# Patient Record
Sex: MALE | Race: WHITE | NOT HISPANIC OR LATINO | Employment: FULL TIME | URBAN - METROPOLITAN AREA
[De-identification: names, ages, dates, MRNs, and addresses within clinical notes are randomized per-mention and may not be internally consistent; named-entity substitution may affect disease eponyms.]

---

## 2017-01-03 ENCOUNTER — CLINICAL SUPPORT (OUTPATIENT)
Dept: PEDIATRIC CARDIOLOGY | Facility: CLINIC | Age: 42
End: 2017-01-03
Payer: COMMERCIAL

## 2017-01-03 DIAGNOSIS — I49.5 SINUS NODE DYSFUNCTION: ICD-10-CM

## 2017-01-03 PROCEDURE — 93294 REM INTERROG EVL PM/LDLS PM: CPT | Mod: S$GLB,,, | Performed by: PEDIATRICS

## 2017-01-03 PROCEDURE — 93296 REM INTERROG EVL PM/IDS: CPT | Mod: 51,S$GLB,, | Performed by: PEDIATRICS

## 2017-01-05 ENCOUNTER — PATIENT MESSAGE (OUTPATIENT)
Dept: PEDIATRIC CARDIOLOGY | Facility: CLINIC | Age: 42
End: 2017-01-05

## 2017-01-06 ENCOUNTER — TELEPHONE (OUTPATIENT)
Dept: CARDIOLOGY | Facility: CLINIC | Age: 42
End: 2017-01-06

## 2017-01-07 NOTE — PROGRESS NOTES
Implantable cardiac device remote interrogation reviewed; please refer to device clinic note for full details.

## 2017-01-12 ENCOUNTER — TELEPHONE (OUTPATIENT)
Dept: PEDIATRIC CARDIOLOGY | Facility: CLINIC | Age: 42
End: 2017-01-12

## 2017-01-12 NOTE — TELEPHONE ENCOUNTER
----- Message from Christiano Dutton sent at 1/11/2017  3:19 PM CST -----  Contact:  [facility] Ph: [phone]  [facility] called as a courtesy informing that the above named patient's application has been approved. Caller states if any question call back, and anyone will be able to assist.

## 2017-01-18 ENCOUNTER — TELEPHONE (OUTPATIENT)
Dept: CARDIOLOGY | Facility: CLINIC | Age: 42
End: 2017-01-18

## 2017-01-20 ENCOUNTER — LAB VISIT (OUTPATIENT)
Dept: LAB | Facility: HOSPITAL | Age: 42
End: 2017-01-20
Attending: PEDIATRICS
Payer: COMMERCIAL

## 2017-01-20 ENCOUNTER — ANTI-COAG VISIT (OUTPATIENT)
Dept: CARDIOLOGY | Facility: CLINIC | Age: 42
End: 2017-01-20

## 2017-01-20 DIAGNOSIS — Z79.01 LONG TERM CURRENT USE OF ANTICOAGULANT THERAPY: ICD-10-CM

## 2017-01-20 DIAGNOSIS — Z79.01 LONG-TERM (CURRENT) USE OF ANTICOAGULANTS: ICD-10-CM

## 2017-01-20 DIAGNOSIS — Z98.890 S/P FONTAN PROCEDURE: ICD-10-CM

## 2017-01-20 LAB
INR PPP: 1.9
PROTHROMBIN TIME: 19.5 SEC

## 2017-01-20 PROCEDURE — 85610 PROTHROMBIN TIME: CPT

## 2017-01-20 PROCEDURE — 36415 COLL VENOUS BLD VENIPUNCTURE: CPT | Mod: PO

## 2017-01-20 NOTE — PROGRESS NOTES
Patient reports a coumadin dose of 8mg daily and reports missing 2 doses but cannot confirm when he missed

## 2017-02-03 ENCOUNTER — PATIENT MESSAGE (OUTPATIENT)
Dept: PEDIATRIC CARDIOLOGY | Facility: CLINIC | Age: 42
End: 2017-02-03

## 2017-02-27 RX ORDER — WARFARIN 6 MG/1
TABLET ORAL
Qty: 30 TABLET | Refills: 6 | Status: SHIPPED | OUTPATIENT
Start: 2017-02-27 | End: 2017-11-05 | Stop reason: SDUPTHER

## 2017-02-27 RX ORDER — WARFARIN 2 MG/1
TABLET ORAL
Qty: 35 TABLET | Refills: 6 | Status: SHIPPED | OUTPATIENT
Start: 2017-02-27 | End: 2017-12-12 | Stop reason: SDUPTHER

## 2017-03-03 ENCOUNTER — ANTI-COAG VISIT (OUTPATIENT)
Dept: CARDIOLOGY | Facility: CLINIC | Age: 42
End: 2017-03-03

## 2017-03-03 ENCOUNTER — LAB VISIT (OUTPATIENT)
Dept: LAB | Facility: HOSPITAL | Age: 42
End: 2017-03-03
Attending: PEDIATRICS
Payer: COMMERCIAL

## 2017-03-03 DIAGNOSIS — Z98.890 S/P FONTAN PROCEDURE: ICD-10-CM

## 2017-03-03 DIAGNOSIS — Z79.01 LONG TERM CURRENT USE OF ANTICOAGULANT THERAPY: ICD-10-CM

## 2017-03-03 DIAGNOSIS — Z79.01 LONG-TERM (CURRENT) USE OF ANTICOAGULANTS: ICD-10-CM

## 2017-03-03 LAB
INR PPP: 2.4
PROTHROMBIN TIME: 25.4 SEC

## 2017-03-03 PROCEDURE — 85610 PROTHROMBIN TIME: CPT | Mod: PO

## 2017-03-03 PROCEDURE — 36415 COLL VENOUS BLD VENIPUNCTURE: CPT | Mod: PO

## 2017-04-06 ENCOUNTER — TELEPHONE (OUTPATIENT)
Dept: PEDIATRIC CARDIOLOGY | Facility: CLINIC | Age: 42
End: 2017-04-06

## 2017-04-13 ENCOUNTER — ANTI-COAG VISIT (OUTPATIENT)
Dept: CARDIOLOGY | Facility: CLINIC | Age: 42
End: 2017-04-13

## 2017-04-13 ENCOUNTER — LAB VISIT (OUTPATIENT)
Dept: LAB | Facility: HOSPITAL | Age: 42
End: 2017-04-13
Attending: PEDIATRICS
Payer: COMMERCIAL

## 2017-04-13 DIAGNOSIS — Z98.890 S/P FONTAN PROCEDURE: ICD-10-CM

## 2017-04-13 DIAGNOSIS — Z79.01 LONG TERM CURRENT USE OF ANTICOAGULANT THERAPY: ICD-10-CM

## 2017-04-13 LAB
INR PPP: 2.1
PROTHROMBIN TIME: 22.1 SEC

## 2017-04-13 PROCEDURE — 36415 COLL VENOUS BLD VENIPUNCTURE: CPT | Mod: PO

## 2017-04-13 PROCEDURE — 85610 PROTHROMBIN TIME: CPT | Mod: PO

## 2017-04-17 RX ORDER — ENALAPRIL MALEATE 10 MG/1
10 TABLET ORAL 2 TIMES DAILY
Qty: 60 TABLET | Refills: 3 | Status: SHIPPED | OUTPATIENT
Start: 2017-04-17 | End: 2017-09-29 | Stop reason: SDUPTHER

## 2017-05-26 ENCOUNTER — LAB VISIT (OUTPATIENT)
Dept: LAB | Facility: HOSPITAL | Age: 42
End: 2017-05-26
Attending: PEDIATRICS
Payer: COMMERCIAL

## 2017-05-26 ENCOUNTER — ANTI-COAG VISIT (OUTPATIENT)
Dept: CARDIOLOGY | Facility: CLINIC | Age: 42
End: 2017-05-26

## 2017-05-26 DIAGNOSIS — Z98.890 S/P FONTAN PROCEDURE: ICD-10-CM

## 2017-05-26 DIAGNOSIS — Z79.01 LONG TERM CURRENT USE OF ANTICOAGULANT THERAPY: ICD-10-CM

## 2017-05-26 LAB
INR PPP: 2.5
PROTHROMBIN TIME: 24.6 SEC

## 2017-05-26 PROCEDURE — 36415 COLL VENOUS BLD VENIPUNCTURE: CPT | Mod: PO

## 2017-05-26 PROCEDURE — 85610 PROTHROMBIN TIME: CPT

## 2017-05-30 ENCOUNTER — PATIENT MESSAGE (OUTPATIENT)
Dept: CARDIOLOGY | Facility: CLINIC | Age: 42
End: 2017-05-30

## 2017-06-20 DIAGNOSIS — Z98.890 S/P FONTAN PROCEDURE: Primary | ICD-10-CM

## 2017-06-20 RX ORDER — DIGOXIN 125 MCG
0.12 TABLET ORAL NIGHTLY
Qty: 30 TABLET | Refills: 12 | Status: SHIPPED | OUTPATIENT
Start: 2017-06-20 | End: 2017-08-21 | Stop reason: SDUPTHER

## 2017-06-20 RX ORDER — DIGOXIN 250 MCG
0.25 TABLET ORAL EVERY MORNING
Qty: 30 TABLET | Refills: 12 | Status: SHIPPED | OUTPATIENT
Start: 2017-06-20 | End: 2018-07-03 | Stop reason: SDUPTHER

## 2017-06-21 ENCOUNTER — TELEPHONE (OUTPATIENT)
Dept: PEDIATRIC CARDIOLOGY | Facility: CLINIC | Age: 42
End: 2017-06-21

## 2017-06-21 NOTE — TELEPHONE ENCOUNTER
----- Message from Dereje Acosta MD sent at 6/21/2017 10:05 AM CDT -----  Contact: [facility] pharmacy [phone]  It is fine.  Continue what he is on.  ----- Message -----  From: Sol Whitney RN  Sent: 6/21/2017   9:43 AM  To: Dereje Acosta MD        ----- Message -----  From: Lakshmi Stark  Sent: 6/20/2017   2:51 PM  To: Dave FLETCHER Staff    Please call [facility] regarding sotalol (SOTALOL AF) 80 MG tablet. Pt has not been received the right medication. He was prescribe Sotalol AF but he's been receiving Sotatol. Please advise

## 2017-07-04 ENCOUNTER — PATIENT MESSAGE (OUTPATIENT)
Dept: CARDIOLOGY | Facility: CLINIC | Age: 42
End: 2017-07-04

## 2017-07-07 ENCOUNTER — PATIENT MESSAGE (OUTPATIENT)
Dept: ADMINISTRATIVE | Facility: OTHER | Age: 42
End: 2017-07-07

## 2017-07-10 ENCOUNTER — CLINICAL SUPPORT (OUTPATIENT)
Dept: PEDIATRIC CARDIOLOGY | Facility: CLINIC | Age: 42
End: 2017-07-10
Payer: COMMERCIAL

## 2017-07-10 DIAGNOSIS — I49.5 SINUS NODE DYSFUNCTION: ICD-10-CM

## 2017-07-10 PROCEDURE — 93296 REM INTERROG EVL PM/IDS: CPT | Mod: S$GLB,,, | Performed by: PEDIATRICS

## 2017-07-10 PROCEDURE — 93294 REM INTERROG EVL PM/LDLS PM: CPT | Mod: S$GLB,,, | Performed by: PEDIATRICS

## 2017-07-11 DIAGNOSIS — Z98.890 S/P FONTAN PROCEDURE: Primary | ICD-10-CM

## 2017-07-14 ENCOUNTER — ANTI-COAG VISIT (OUTPATIENT)
Dept: CARDIOLOGY | Facility: CLINIC | Age: 42
End: 2017-07-14

## 2017-07-14 ENCOUNTER — LAB VISIT (OUTPATIENT)
Dept: LAB | Facility: HOSPITAL | Age: 42
End: 2017-07-14
Attending: PEDIATRICS
Payer: COMMERCIAL

## 2017-07-14 DIAGNOSIS — Z98.890 S/P FONTAN PROCEDURE: ICD-10-CM

## 2017-07-14 DIAGNOSIS — Z79.01 LONG TERM CURRENT USE OF ANTICOAGULANT THERAPY: ICD-10-CM

## 2017-07-14 LAB
INR PPP: 2
PROTHROMBIN TIME: 21.7 SEC

## 2017-07-14 PROCEDURE — 85610 PROTHROMBIN TIME: CPT | Mod: PO

## 2017-07-14 PROCEDURE — 36415 COLL VENOUS BLD VENIPUNCTURE: CPT | Mod: PO

## 2017-07-16 RX ORDER — METOPROLOL SUCCINATE 200 MG/1
TABLET, EXTENDED RELEASE ORAL
Qty: 30 TABLET | Refills: 3 | Status: SHIPPED | OUTPATIENT
Start: 2017-07-16 | End: 2017-07-20 | Stop reason: SDUPTHER

## 2017-07-20 ENCOUNTER — CLINICAL SUPPORT (OUTPATIENT)
Dept: PEDIATRIC CARDIOLOGY | Facility: CLINIC | Age: 42
End: 2017-07-20
Payer: COMMERCIAL

## 2017-07-20 ENCOUNTER — HOSPITAL ENCOUNTER (OUTPATIENT)
Dept: CARDIOLOGY | Facility: CLINIC | Age: 42
Discharge: HOME | End: 2017-07-20
Payer: COMMERCIAL

## 2017-07-20 ENCOUNTER — HOSPITAL ENCOUNTER (OUTPATIENT)
Dept: RADIOLOGY | Facility: HOSPITAL | Age: 42
Discharge: HOME | End: 2017-07-20
Attending: PEDIATRICS
Payer: COMMERCIAL

## 2017-07-20 ENCOUNTER — OFFICE VISIT (OUTPATIENT)
Dept: CARDIOLOGY | Facility: CLINIC | Age: 42
End: 2017-07-20
Payer: COMMERCIAL

## 2017-07-20 ENCOUNTER — ANTI-COAG VISIT (OUTPATIENT)
Dept: CARDIOLOGY | Facility: CLINIC | Age: 42
End: 2017-07-20

## 2017-07-20 VITALS
SYSTOLIC BLOOD PRESSURE: 125 MMHG | BODY MASS INDEX: 28.75 KG/M2 | OXYGEN SATURATION: 91 % | HEIGHT: 63 IN | WEIGHT: 162.25 LBS | WEIGHT: 162.25 LBS | DIASTOLIC BLOOD PRESSURE: 59 MMHG | SYSTOLIC BLOOD PRESSURE: 125 MMHG | HEART RATE: 75 BPM | OXYGEN SATURATION: 91 % | HEIGHT: 63 IN | BODY MASS INDEX: 28.75 KG/M2 | HEART RATE: 75 BPM | DIASTOLIC BLOOD PRESSURE: 59 MMHG

## 2017-07-20 DIAGNOSIS — G47.33 OSA (OBSTRUCTIVE SLEEP APNEA): ICD-10-CM

## 2017-07-20 DIAGNOSIS — I49.9 VENTRICULAR ARRHYTHMIA: ICD-10-CM

## 2017-07-20 DIAGNOSIS — Q24.9 ADULT CONGENITAL HEART DISEASE: ICD-10-CM

## 2017-07-20 DIAGNOSIS — R23.0 CYANOSIS: ICD-10-CM

## 2017-07-20 DIAGNOSIS — Z79.01 LONG TERM CURRENT USE OF ANTICOAGULANT THERAPY: ICD-10-CM

## 2017-07-20 DIAGNOSIS — I48.3 TYPICAL ATRIAL FLUTTER: ICD-10-CM

## 2017-07-20 DIAGNOSIS — Q24.9 HEART FAILURE DUE TO CONGENITAL HEART DISEASE: ICD-10-CM

## 2017-07-20 DIAGNOSIS — Q20.3 TRICUSPID ATRESIA WITH D-TRANSPOSITION OF GREAT ARTERIES: ICD-10-CM

## 2017-07-20 DIAGNOSIS — Q21.0 VSD (VENTRICULAR SEPTAL DEFECT AND AORTIC ARCH HYPOPLASIA: ICD-10-CM

## 2017-07-20 DIAGNOSIS — Z98.890 S/P FONTAN PROCEDURE: ICD-10-CM

## 2017-07-20 DIAGNOSIS — Q24.9 PULMONARY ARTERIAL HYPERTENSION ASSOCIATED WITH CONGENITAL HEART DISEASE: ICD-10-CM

## 2017-07-20 DIAGNOSIS — Z95.0 PACEMAKER: ICD-10-CM

## 2017-07-20 DIAGNOSIS — I47.20 VT (VENTRICULAR TACHYCARDIA): ICD-10-CM

## 2017-07-20 DIAGNOSIS — Q25.42 VSD (VENTRICULAR SEPTAL DEFECT AND AORTIC ARCH HYPOPLASIA: ICD-10-CM

## 2017-07-20 DIAGNOSIS — I48.4 ATYPICAL ATRIAL FLUTTER: ICD-10-CM

## 2017-07-20 DIAGNOSIS — Q22.4 TRICUSPID ATRESIA WITH D-TRANSPOSITION OF GREAT ARTERIES: ICD-10-CM

## 2017-07-20 DIAGNOSIS — I50.9 HEART FAILURE DUE TO CONGENITAL HEART DISEASE: ICD-10-CM

## 2017-07-20 DIAGNOSIS — I27.29 PULMONARY ARTERIAL HYPERTENSION ASSOCIATED WITH CONGENITAL HEART DISEASE: ICD-10-CM

## 2017-07-20 DIAGNOSIS — Z98.890 HISTORY OF DAMUS PROCEDURE: ICD-10-CM

## 2017-07-20 DIAGNOSIS — R73.9 HYPERGLYCEMIA: Primary | ICD-10-CM

## 2017-07-20 DIAGNOSIS — Q24.9 CONGENITAL ANOMALIES OF THE HEART: ICD-10-CM

## 2017-07-20 DIAGNOSIS — G47.33 OSA (OBSTRUCTIVE SLEEP APNEA): Primary | ICD-10-CM

## 2017-07-20 DIAGNOSIS — Z95.0 CARDIAC PACEMAKER IN SITU: ICD-10-CM

## 2017-07-20 DIAGNOSIS — I48.92 ATRIAL FLUTTER, UNSPECIFIED TYPE: ICD-10-CM

## 2017-07-20 PROCEDURE — 93303 ECHO TRANSTHORACIC: CPT | Mod: S$GLB,,, | Performed by: INTERNAL MEDICINE

## 2017-07-20 PROCEDURE — 93320 DOPPLER ECHO COMPLETE: CPT | Mod: S$GLB,,, | Performed by: INTERNAL MEDICINE

## 2017-07-20 PROCEDURE — 93325 DOPPLER ECHO COLOR FLOW MAPG: CPT | Mod: S$GLB,,, | Performed by: INTERNAL MEDICINE

## 2017-07-20 PROCEDURE — 93975 VASCULAR STUDY: CPT | Mod: 26,,, | Performed by: RADIOLOGY

## 2017-07-20 PROCEDURE — 76705 ECHO EXAM OF ABDOMEN: CPT | Mod: 26,59,, | Performed by: RADIOLOGY

## 2017-07-20 PROCEDURE — 93975 VASCULAR STUDY: CPT | Mod: TC

## 2017-07-20 PROCEDURE — 93000 ELECTROCARDIOGRAM COMPLETE: CPT | Mod: S$GLB,,, | Performed by: INTERNAL MEDICINE

## 2017-07-20 PROCEDURE — 99214 OFFICE O/P EST MOD 30 MIN: CPT | Mod: 25,S$GLB,, | Performed by: PEDIATRICS

## 2017-07-20 PROCEDURE — 99999 PR PBB SHADOW E&M-EST. PATIENT-LVL III: CPT | Mod: PBBFAC,,, | Performed by: PEDIATRICS

## 2017-07-20 PROCEDURE — 93227 XTRNL ECG REC<48 HR R&I: CPT | Mod: S$GLB,,, | Performed by: PEDIATRICS

## 2017-07-20 PROCEDURE — 99999 PR PBB SHADOW E&M-EST. PATIENT-LVL IV: CPT | Mod: PBBFAC,,, | Performed by: PEDIATRICS

## 2017-07-20 RX ORDER — METOPROLOL SUCCINATE 100 MG/1
300 TABLET, EXTENDED RELEASE ORAL DAILY
Qty: 90 TABLET | Refills: 11 | Status: SHIPPED | OUTPATIENT
Start: 2017-07-20 | End: 2018-07-26 | Stop reason: SDUPTHER

## 2017-07-20 NOTE — LETTER
July 23, 2017        Kevin Chaves MD  [address]  [address]  [place] [address]             [facility]  [address]  [place] [address]  Phone: [phone]   Patient: Marcello Briggs   MR Number: [id number]   YOB: 1975   Date of Visit: 7/20/2017       Dear Dr. Chaves:    Thank you for referring Marcello Briggs to me for evaluation. Attached you will find relevant portions of my assessment and plan of care.    If you have questions, please do not hesitate to call me. I look forward to following Marcello Briggs along with you.    Sincerely,      David Torres MD            CC  No Recipients    Enclosure
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
## 2017-07-21 DIAGNOSIS — I49.9 VENTRICULAR ARRHYTHMIA: Primary | ICD-10-CM

## 2017-07-21 DIAGNOSIS — Z98.890 S/P FONTAN PROCEDURE: Primary | ICD-10-CM

## 2017-07-21 DIAGNOSIS — Q24.9 CONGENITAL ANOMALIES OF THE HEART: ICD-10-CM

## 2017-07-21 LAB
AORTIC VALVE REGURGITATION: NORMAL
DIASTOLIC DYSFUNCTION: NO
MITRAL VALVE REGURGITATION: NORMAL
RETIRED EF AND QEF - SEE NOTES: 50 (ref 55–65)

## 2017-07-21 PROCEDURE — 93280 PM DEVICE PROGR EVAL DUAL: CPT | Mod: S$GLB,,, | Performed by: PEDIATRICS
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
## 2017-08-11 ENCOUNTER — ANTI-COAG VISIT (OUTPATIENT)
Dept: CARDIOLOGY | Facility: CLINIC | Age: 42
End: 2017-08-11

## 2017-08-11 ENCOUNTER — LAB VISIT (OUTPATIENT)
Dept: LAB | Facility: HOSPITAL | Age: 42
End: 2017-08-11
Attending: PEDIATRICS
Payer: COMMERCIAL

## 2017-08-11 DIAGNOSIS — Z79.01 LONG TERM CURRENT USE OF ANTICOAGULANT THERAPY: ICD-10-CM

## 2017-08-11 DIAGNOSIS — Z98.890 S/P FONTAN PROCEDURE: ICD-10-CM

## 2017-08-11 LAB
INR PPP: 2.2
PROTHROMBIN TIME: 23.5 SEC

## 2017-08-11 PROCEDURE — 36415 COLL VENOUS BLD VENIPUNCTURE: CPT | Mod: PO

## 2017-08-11 PROCEDURE — 85610 PROTHROMBIN TIME: CPT | Mod: PO

## 2017-08-21 RX ORDER — DIGOXIN 125 MCG
0.12 TABLET ORAL NIGHTLY
Qty: 30 TABLET | Refills: 12 | Status: SHIPPED | OUTPATIENT
Start: 2017-08-21 | End: 2018-01-25 | Stop reason: DRUGHIGH

## 2017-09-08 ENCOUNTER — LAB VISIT (OUTPATIENT)
Dept: LAB | Facility: HOSPITAL | Age: 42
End: 2017-09-08
Attending: PEDIATRICS
Payer: COMMERCIAL

## 2017-09-08 ENCOUNTER — ANTI-COAG VISIT (OUTPATIENT)
Dept: CARDIOLOGY | Facility: CLINIC | Age: 42
End: 2017-09-08

## 2017-09-08 DIAGNOSIS — Z98.890 S/P FONTAN PROCEDURE: ICD-10-CM

## 2017-09-08 DIAGNOSIS — Z79.01 LONG TERM CURRENT USE OF ANTICOAGULANT THERAPY: ICD-10-CM

## 2017-09-08 LAB
INR PPP: 1.4
PROTHROMBIN TIME: 14.9 SEC

## 2017-09-08 PROCEDURE — 36415 COLL VENOUS BLD VENIPUNCTURE: CPT | Mod: PO

## 2017-09-08 PROCEDURE — 85610 PROTHROMBIN TIME: CPT | Mod: PO

## 2017-09-08 NOTE — PROGRESS NOTES
Patient questioned and reports taking 8 mg daily incorrect dose.  Asked patient if he missed any doses? Stated yes I missed a couple. Patient reports missing 9/7 dose and 9/5 dose and other doses last week .  Reports no new medications '' digoxin (LANOXIN) 125 mcg tablet'' reports not new or  any other changes .  Patient advised clinic to leave him a message if he does not answer.

## 2017-09-22 ENCOUNTER — ANTI-COAG VISIT (OUTPATIENT)
Dept: CARDIOLOGY | Facility: CLINIC | Age: 42
End: 2017-09-22

## 2017-09-22 ENCOUNTER — LAB VISIT (OUTPATIENT)
Dept: LAB | Facility: HOSPITAL | Age: 42
End: 2017-09-22
Attending: PEDIATRICS
Payer: COMMERCIAL

## 2017-09-22 DIAGNOSIS — Z98.890 S/P FONTAN PROCEDURE: ICD-10-CM

## 2017-09-22 DIAGNOSIS — Z79.01 LONG TERM CURRENT USE OF ANTICOAGULANT THERAPY: ICD-10-CM

## 2017-09-22 LAB
INR PPP: 1.3
PROTHROMBIN TIME: 14.3 SEC

## 2017-09-22 PROCEDURE — 36415 COLL VENOUS BLD VENIPUNCTURE: CPT | Mod: PO

## 2017-09-22 PROCEDURE — 85610 PROTHROMBIN TIME: CPT | Mod: PO

## 2017-10-01 RX ORDER — ENALAPRIL MALEATE 10 MG/1
TABLET ORAL
Qty: 60 TABLET | Refills: 3 | Status: SHIPPED | OUTPATIENT
Start: 2017-10-01 | End: 2018-03-05 | Stop reason: SDUPTHER

## 2017-10-06 ENCOUNTER — ANTI-COAG VISIT (OUTPATIENT)
Dept: CARDIOLOGY | Facility: CLINIC | Age: 42
End: 2017-10-06

## 2017-10-06 ENCOUNTER — LAB VISIT (OUTPATIENT)
Dept: LAB | Facility: HOSPITAL | Age: 42
End: 2017-10-06
Attending: PEDIATRICS
Payer: COMMERCIAL

## 2017-10-06 DIAGNOSIS — Z79.01 LONG TERM CURRENT USE OF ANTICOAGULANT THERAPY: ICD-10-CM

## 2017-10-06 DIAGNOSIS — Z98.890 S/P FONTAN PROCEDURE: ICD-10-CM

## 2017-10-06 LAB
INR PPP: 3
PROTHROMBIN TIME: 32 SEC

## 2017-10-06 PROCEDURE — 36415 COLL VENOUS BLD VENIPUNCTURE: CPT | Mod: PO

## 2017-10-06 PROCEDURE — 85610 PROTHROMBIN TIME: CPT | Mod: PO

## 2017-10-09 ENCOUNTER — CLINICAL SUPPORT (OUTPATIENT)
Dept: PEDIATRIC CARDIOLOGY | Facility: CLINIC | Age: 42
End: 2017-10-09
Payer: COMMERCIAL

## 2017-10-09 DIAGNOSIS — Z98.890 S/P FONTAN PROCEDURE: ICD-10-CM

## 2017-10-09 PROCEDURE — 93296 REM INTERROG EVL PM/IDS: CPT | Mod: S$GLB,,, | Performed by: PEDIATRICS

## 2017-10-09 PROCEDURE — 93294 REM INTERROG EVL PM/LDLS PM: CPT | Mod: S$GLB,,, | Performed by: PEDIATRICS

## 2017-10-17 ENCOUNTER — PATIENT MESSAGE (OUTPATIENT)
Dept: CARDIOLOGY | Facility: CLINIC | Age: 42
End: 2017-10-17

## 2017-10-20 ENCOUNTER — ANTI-COAG VISIT (OUTPATIENT)
Dept: CARDIOLOGY | Facility: CLINIC | Age: 42
End: 2017-10-20

## 2017-10-20 ENCOUNTER — LAB VISIT (OUTPATIENT)
Dept: LAB | Facility: HOSPITAL | Age: 42
End: 2017-10-20
Attending: PEDIATRICS
Payer: COMMERCIAL

## 2017-10-20 DIAGNOSIS — Z98.890 S/P FONTAN PROCEDURE: ICD-10-CM

## 2017-10-20 DIAGNOSIS — Z79.01 LONG TERM CURRENT USE OF ANTICOAGULANT THERAPY: ICD-10-CM

## 2017-10-20 LAB
INR PPP: 2.7
PROTHROMBIN TIME: 28.6 SEC

## 2017-10-20 PROCEDURE — 36415 COLL VENOUS BLD VENIPUNCTURE: CPT | Mod: PO

## 2017-10-20 PROCEDURE — 85610 PROTHROMBIN TIME: CPT | Mod: PO

## 2017-10-27 ENCOUNTER — DOCUMENTATION ONLY (OUTPATIENT)
Dept: PEDIATRIC CARDIOLOGY | Facility: CLINIC | Age: 42
End: 2017-10-27

## 2017-10-27 NOTE — PROGRESS NOTES
I have been messaging him about possible shoulder surgery.  I spoke today with his orthopedic surgeon in [place], Dr. Land.  I messaged Mr. Briggs back with the following:    I spoke with Dr. Land.  I basically told him the followin. Risk of surgery is higher in Fontan patients (about a 15 fold increased risk compared to the general population).  Much of that risk is related to anesthesia.  So if possible, would avoid elective surgery.  2. If surgery is needed, would recommend it being done at [facility] to allow congenital cardiac anesthesiologists to do the sedation.    He fully agreed.  He thought he could provide good relief with intermittent injections in his office, but if not, can refer to one of our shoulder surgeons (I could help facilitate this) so the surgery could be done with our anesthesiologists.      Let me know if you have questions.

## 2017-11-06 RX ORDER — WARFARIN 6 MG/1
TABLET ORAL
Qty: 30 TABLET | Refills: 6 | Status: SHIPPED | OUTPATIENT
Start: 2017-11-06 | End: 2018-08-20 | Stop reason: SDUPTHER

## 2017-11-10 ENCOUNTER — ANTI-COAG VISIT (OUTPATIENT)
Dept: CARDIOLOGY | Facility: CLINIC | Age: 42
End: 2017-11-10

## 2017-11-10 ENCOUNTER — LAB VISIT (OUTPATIENT)
Dept: LAB | Facility: HOSPITAL | Age: 42
End: 2017-11-10
Attending: PEDIATRICS
Payer: COMMERCIAL

## 2017-11-10 DIAGNOSIS — Z98.890 S/P FONTAN PROCEDURE: ICD-10-CM

## 2017-11-10 DIAGNOSIS — Z79.01 LONG TERM CURRENT USE OF ANTICOAGULANT THERAPY: ICD-10-CM

## 2017-11-10 LAB
INR PPP: 1.4
PROTHROMBIN TIME: 14 SEC

## 2017-11-10 PROCEDURE — 36415 COLL VENOUS BLD VENIPUNCTURE: CPT | Mod: PO

## 2017-11-10 PROCEDURE — 85610 PROTHROMBIN TIME: CPT

## 2017-11-21 RX ORDER — FUROSEMIDE 20 MG/1
20 TABLET ORAL DAILY
Qty: 60 TABLET | Refills: 1 | Status: SHIPPED | OUTPATIENT
Start: 2017-11-21 | End: 2018-12-16 | Stop reason: SDUPTHER

## 2017-12-12 RX ORDER — WARFARIN 2 MG/1
TABLET ORAL
Qty: 35 TABLET | Refills: 6 | Status: SHIPPED | OUTPATIENT
Start: 2017-12-12 | End: 2018-08-13 | Stop reason: SDUPTHER

## 2017-12-12 RX ORDER — SOTALOL HYDROCHLORIDE 80 MG/1
TABLET ORAL
Qty: 120 TABLET | Refills: 3 | Status: SHIPPED | OUTPATIENT
Start: 2017-12-12 | End: 2018-05-06 | Stop reason: SDUPTHER

## 2017-12-21 RX ORDER — SILDENAFIL CITRATE 20 MG/1
TABLET ORAL
Qty: 180 TABLET | Refills: 0 | Status: SHIPPED | OUTPATIENT
Start: 2017-12-21 | End: 2018-01-11

## 2017-12-21 RX ORDER — SILDENAFIL CITRATE 20 MG/1
TABLET ORAL
Qty: 180 TABLET | Refills: 3 | Status: SHIPPED | OUTPATIENT
Start: 2017-12-21 | End: 2017-12-21 | Stop reason: SDUPTHER

## 2018-01-11 ENCOUNTER — TELEPHONE (OUTPATIENT)
Dept: PEDIATRIC CARDIOLOGY | Facility: CLINIC | Age: 43
End: 2018-01-11

## 2018-01-11 RX ORDER — SILDENAFIL CITRATE 20 MG/1
20 TABLET ORAL 2 TIMES DAILY
Qty: 60 TABLET | Refills: 11 | Status: SHIPPED | OUTPATIENT
Start: 2018-01-11 | End: 2019-01-11

## 2018-01-11 NOTE — TELEPHONE ENCOUNTER
----- Message from Angelica Leigh sent at 1/11/2018 12:41 PM CST -----  Contact: Stephani with -[phone]  She is calling to check on the status of the prior authorization form she faxed to you. She is needing this filled out and faxed back to her as soon as you can. Please call her back to discuss.

## 2018-01-16 ENCOUNTER — TELEPHONE (OUTPATIENT)
Dept: PEDIATRIC CARDIOLOGY | Facility: CLINIC | Age: 43
End: 2018-01-16

## 2018-01-25 ENCOUNTER — OFFICE VISIT (OUTPATIENT)
Dept: CARDIOLOGY | Facility: CLINIC | Age: 43
End: 2018-01-25
Payer: COMMERCIAL

## 2018-01-25 ENCOUNTER — HOSPITAL ENCOUNTER (OUTPATIENT)
Dept: CARDIOLOGY | Facility: CLINIC | Age: 43
Discharge: HOME | End: 2018-01-25
Payer: COMMERCIAL

## 2018-01-25 VITALS
OXYGEN SATURATION: 89 % | HEIGHT: 63 IN | OXYGEN SATURATION: 89 % | BODY MASS INDEX: 30.16 KG/M2 | HEART RATE: 75 BPM | DIASTOLIC BLOOD PRESSURE: 60 MMHG | SYSTOLIC BLOOD PRESSURE: 120 MMHG | SYSTOLIC BLOOD PRESSURE: 120 MMHG | HEART RATE: 75 BPM | WEIGHT: 170.19 LBS | DIASTOLIC BLOOD PRESSURE: 60 MMHG | BODY MASS INDEX: 30.16 KG/M2 | WEIGHT: 170.19 LBS | HEIGHT: 63 IN

## 2018-01-25 DIAGNOSIS — Z98.890 S/P FONTAN PROCEDURE: ICD-10-CM

## 2018-01-25 DIAGNOSIS — R23.0 CYANOSIS: Primary | ICD-10-CM

## 2018-01-25 DIAGNOSIS — I48.4 ATYPICAL ATRIAL FLUTTER: ICD-10-CM

## 2018-01-25 DIAGNOSIS — Q22.4 TRICUSPID ATRESIA WITH D-TRANSPOSITION OF GREAT ARTERIES: ICD-10-CM

## 2018-01-25 DIAGNOSIS — Q20.3 TRICUSPID ATRESIA WITH D-TRANSPOSITION OF GREAT ARTERIES: ICD-10-CM

## 2018-01-25 DIAGNOSIS — Q24.9 PULMONARY ARTERIAL HYPERTENSION ASSOCIATED WITH CONGENITAL HEART DISEASE: ICD-10-CM

## 2018-01-25 DIAGNOSIS — Q24.9 HEART FAILURE DUE TO CONGENITAL HEART DISEASE: ICD-10-CM

## 2018-01-25 DIAGNOSIS — R73.9 HYPERGLYCEMIA: ICD-10-CM

## 2018-01-25 DIAGNOSIS — Q24.9 ADULT CONGENITAL HEART DISEASE: ICD-10-CM

## 2018-01-25 DIAGNOSIS — I48.92 ATRIAL FLUTTER, UNSPECIFIED TYPE: ICD-10-CM

## 2018-01-25 DIAGNOSIS — I27.29 PULMONARY ARTERIAL HYPERTENSION ASSOCIATED WITH CONGENITAL HEART DISEASE: ICD-10-CM

## 2018-01-25 DIAGNOSIS — I50.9 HEART FAILURE DUE TO CONGENITAL HEART DISEASE: ICD-10-CM

## 2018-01-25 DIAGNOSIS — I48.92 ATRIAL FLUTTER, UNSPECIFIED TYPE: Primary | ICD-10-CM

## 2018-01-25 LAB
MITRAL VALVE REGURGITATION: NORMAL
RETIRED EF AND QEF - SEE NOTES: 45 (ref 55–65)

## 2018-01-25 PROCEDURE — 99214 OFFICE O/P EST MOD 30 MIN: CPT | Mod: 25,S$GLB,, | Performed by: PEDIATRICS

## 2018-01-25 PROCEDURE — 99999 PR PBB SHADOW E&M-EST. PATIENT-LVL III: CPT | Mod: PBBFAC,,, | Performed by: PEDIATRICS

## 2018-01-25 PROCEDURE — 93000 ELECTROCARDIOGRAM COMPLETE: CPT | Mod: S$GLB,,, | Performed by: INTERNAL MEDICINE

## 2018-01-25 PROCEDURE — 93303 ECHO TRANSTHORACIC: CPT | Mod: S$GLB,,, | Performed by: PEDIATRICS

## 2018-01-25 PROCEDURE — 93320 DOPPLER ECHO COMPLETE: CPT | Mod: S$GLB,,, | Performed by: PEDIATRICS

## 2018-01-25 PROCEDURE — 93280 PM DEVICE PROGR EVAL DUAL: CPT | Mod: S$GLB,,, | Performed by: PEDIATRICS

## 2018-01-25 PROCEDURE — 93325 DOPPLER ECHO COLOR FLOW MAPG: CPT | Mod: S$GLB,,, | Performed by: PEDIATRICS
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
## 2018-01-25 NOTE — LETTER
January 28, 2018        Kevin Chaves MD  [address]  [address]  [place] [address]             [facility]  [address]  [place] [address]  Phone: [phone]   Patient: Marcello Briggs   MR Number: [id number]   YOB: 1975   Date of Visit: 1/25/2018       Dear Dr. Chaves:    Thank you for referring Marcello Briggs to me for evaluation. Attached you will find relevant portions of my assessment and plan of care.    If you have questions, please do not hesitate to call me. I look forward to following Marcello Briggs along with you.    Sincerely,      David Torres MD            CC  No Recipients    Enclosure

## 2018-01-26 NOTE — PROGRESS NOTES
01/26/2018    Patient Name: LEVON GANDHI  YOB: 1975  [facility] Number: [id number]    Kevin Chaves MD  [address]  [place] [address]    MIRIAM Garcia MD  [address]   [address]  [place] [address]    Dear Dr. Damian:    It is a pleasure to see Levon at our main campus adult congenital cardiology clinic to evaluate tricuspid atresia.    Levon is a 42 y.o. Male with a complex past medical history. To summarize, his diagnoses are as follows:  1. 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  2. History of a classic Roger shunt and a 12 mm conduit from the right atrium to the left pulmonary artery along with a Damus Judy Stansel operation - now with trivial native and mild trivial  brigette-aortic insufficiency  3. Subsequent lateral tunnel Fontan operation utilizing a 16 mm ring Goretex graft from the left pulmonary artery to the right pulmonary artery.  4. 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    5. Significantly elevated central venous pressures with chronic desaturation at least partially due to a collateral vessels - improved after catheterization but still an issue.  6. Varicose veins followed by vascular medicine - improved.  7. No evidence of protein-losing enteropathy.    8. Mild AV valve insuffiency.  9. obstructive sleep apnea    On July 7, 2010 he underwent a cardiac catheterization:   1. Complex univentricular cardiac defect with superior inferior ventricles with double inlet left ventricle (atrioventricular connection not defined at catheterization), status post Fhmsw-Dezk-Xuycdpc connection and intracardiac lateral tunnel Fontan.   2. Moderate neoaortic (pulmonary) valve insufficiency.   3. Left aorta.   4. 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   5. Elevated  "central venous pressure (mean 19), transpulmonary gradient 4-5 mmHg.    Interval history:  He denies chest pain, shortness of breath, worsening dyspnea on exertion, diarrhea, productive cough.  He denies any syncope or near syncope.  He takes his lasix about once per week.  Work is going well.  Of note, he typically feels his arrhythmias as "back pain".  He hasn't had any of this type of back pain since I last saw him.    The family history is negative for congenital heart disease and sudden death in individuals less than 40 years old.    Past medical history is as noted above. It is negative for any other chronic illnesses or surgeries.    He denies tobacco and illicit drug abuse. He denies alcohol abuse. 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  He is  with children.    Current Outpatient Prescriptions on File Prior to Visit   Medication Sig Dispense Refill    digoxin (LANOXIN) 250 mcg tablet Take 1 tablet (0.25 mg total) by mouth every morning. 30 tablet 12    enalapril (VASOTEC) 10 MG tablet TAKE 1 TABLET BY MOUTH TWICE A DAY 60 tablet 3    furosemide (LASIX) 20 MG tablet TAKE 1 TABLET (20 MG TOTAL) BY MOUTH ONCE DAILY. 60 tablet 1    metoprolol succinate (TOPROL-XL) 100 MG 24 hr tablet Take 3 tablets (300 mg total) by mouth once daily. 90 tablet 11    sildenafil, antihypertensive, (REVATIO) 20 mg Tab Take 1 tablet (20 mg total) by mouth 2 (two) times daily. 60 tablet 11    SOTALOL AF 80 mg tablet TAKE 2 TABLETS BY MOUTH 2 TIMES DAILY 120 tablet 3    warfarin (COUMADIN) 2 MG tablet TAKE 10MG (6MG + 2 (2MG) TABS EVERY SATURDAY AND 8MG (6MG + 2MG) TABS ALL OTHER DAYS AS DIRECTED 35 tablet 6    warfarin (COUMADIN) 6 MG tablet TAKE 10MG (6MG + 2 (2MG) TABS EVERY SATURDAY AND 8MG (6MG + 2MG) TABS ALL OTHER DAYS AS DIRECTED 30 tablet 6     No current facility-administered medications on file prior to visit.      He has no known drug allergies.    /60 (BP Location: Left arm, Patient Position: " "Sitting, BP Method: Large (Automatic))   Pulse 75   Ht 5' 3" (1.6 m)   Wt 77.2 kg (170 lb 3.1 oz)   SpO2 (!) 89%   BMI 30.15 kg/m²   Weight: 77.2 kg (170 lb 3.1 oz)     In general, his baseline ruddiness is a little improved compared to a few years ago.  He is a white male in no apparent distress. Head is normocephalic and atraumatic. The eyes, nares, and oropharynx are clear. The neck is supple without jugular venous distention or lymphadenopathy, and his face does not look swollen today (although he has a new beard which complicates evaluation). Lungs are clear to auscultation bilaterally. The chest is symmetrical. Multiple well-healed surgical scars are noted. The pacemaker is palpated in the right upper chest. The area is nontender without evidence of infection. The precordium is quiet. First heart sound is normal. A loud single second heart sound is auscultated. A grade 1/6 systolic ejection murmur is auscultated. No diastolic murmurs or gallops are heard. The abdominal exam reveals a liver edge palpated about 2 cm below the right costal margin. It is not pulsatile. He abdomen is soft and nontender without evidence of ascites. I could not palpate his spleen. There is no significant clubbing and no obvious cyanosis. He has good pulses in his legs bilaterally. There is no pedal edema bilaterally. He does have varicose veins.  No palpable cords or calf tenderness.    I personally reviewed the following studies:  His pacemaker will be interrogated by Dr. Torres.      His echo reveals (my interpretation):  Images consistent with tricuspid atresia, d-TGA, VSD and RV hypoplasia S/P lateral tunnel Fontan -  There is dilated inferior vena cava with laminar flow joining lateral tunnel Fontan ? slowly moving spontaneous contrast and laminar flow with no obvious thrombus in IVC, Hepatic veins or proximal lateral tunnel.   Remaining segments of Fontan circuit and pulmonary branches could not be well demonstrated "   Small functional right atrium.  Unrestricted bidirectional flow at atrial septum.   Mild left atrial enlargement.   Posterior leaflet of mitral valve demonstrated well with cleft like appearance and central insufficiency arising at this abnormality  At least mild mitral valve insufficiency.   Dilated left ventricle, moderate.   Qualitatively mildly reduced (single) ventricle systolic function (EF estimated 45 -50%).   Unrestricted flow at VSD to anterior aorta and small subaortic chamber (Right ventricle).   Anterior native aortic valve with mild insufficiency.   Posteriorly positioned pulmonary valve with mild insufficiency and no stenosis.   No obvious obstruction at DKS anastomosis.   Trivial pericardial effusion.    His EKG reveals AV sequential pacing with a long NV interval and good capture.      Lab Results   Component Value Date    INR 1.4 (H) 11/10/2017    INR 2.7 (H) 10/20/2017    INR 3.0 (H) 10/06/2017     Lab Results   Component Value Date    WBC 4.63 07/20/2017    HGB 16.3 07/20/2017    HCT 47.8 07/20/2017    MCV 90 07/20/2017    PLT 82 (L) 07/20/2017     CMP  Sodium   Date Value Ref Range Status   07/20/2017 136 136 - 145 mmol/L Final     Potassium   Date Value Ref Range Status   07/20/2017 4.7 3.5 - 5.1 mmol/L Final     Chloride   Date Value Ref Range Status   07/20/2017 100 95 - 110 mmol/L Final     CO2   Date Value Ref Range Status   07/20/2017 28 23 - 29 mmol/L Final     Glucose   Date Value Ref Range Status   07/20/2017 167 (H) 70 - 110 mg/dL Final     BUN, Bld   Date Value Ref Range Status   07/20/2017 17 6 - 20 mg/dL Final     Creatinine   Date Value Ref Range Status   07/20/2017 1.0 0.5 - 1.4 mg/dL Final     Calcium   Date Value Ref Range Status   07/20/2017 9.2 8.7 - 10.5 mg/dL Final     Total Protein   Date Value Ref Range Status   07/20/2017 7.1 6.0 - 8.4 g/dL Final     Albumin   Date Value Ref Range Status   07/20/2017 4.1 3.5 - 5.2 g/dL Final     Total Bilirubin   Date Value Ref Range  Status   07/20/2017 1.5 (H) 0.1 - 1.0 mg/dL Final     Comment:     For infants and newborns, interpretation of results should be based  on gestational age, weight and in agreement with clinical  observations.  Premature Infant recommended reference ranges:  Up to 24 hours.............<8.0 mg/dL  Up to 48 hours............<12.0 mg/dL  3-5 days..................<15.0 mg/dL  6-29 days.................<15.0 mg/dL       Alkaline Phosphatase   Date Value Ref Range Status   07/20/2017 64 55 - 135 U/L Final     AST   Date Value Ref Range Status   07/20/2017 30 10 - 40 U/L Final     ALT   Date Value Ref Range Status   07/20/2017 40 10 - 44 U/L Final     Anion Gap   Date Value Ref Range Status   07/20/2017 8 8 - 16 mmol/L Final     eGFR if    Date Value Ref Range Status   07/20/2017 >60.0 >60 mL/min/1.73 m^2 Final     eGFR if non    Date Value Ref Range Status   07/20/2017 >60.0 >60 mL/min/1.73 m^2 Final     Comment:     Calculation used to obtain the estimated glomerular filtration  rate (eGFR) is the CKD-EPI equation. Since race is unknown   in our information system, the eGFR values for   -American and Non--American patients are given   for each creatinine result.       Results for LEVON GANDHI (MRN [id number]) as of 7/20/2017 22:50   Ref. Range 11/11/2016 07:20   AFP Latest Ref Range: 0.0 - 8.4 ng/mL 2.4     Results for LEVON GANDHI (MRN [id number]) as of 7/20/2017 22:50   Ref. Range 7/20/2017 12:25   Iron Latest Ref Range: 45 - 160 ug/dL 77   TIBC Latest Ref Range: 250 - 450 ug/dL 419   Saturated Iron Latest Ref Range: 20 - 50 % 18 (L)   Transferrin Latest Ref Range: 200 - 375 mg/dL 283   Ferritin Latest Ref Range: 20.0 - 300.0 ng/mL 125     Last liver US 2016    Discussion:  In general, he appears to be at baseline.   Obstructive sleep apnea must be completely treated as it likely affects his quality of life and could worsen his PVR and Fontan  function.  He remains at risk for protein losing enteropathy and liver disease.  He is at risk for arrhythmia and heart failure.  He is at significant risk for thrombus formation.  We had a talk today about the risk of hepatocellular carcinoma which is increased in these patients.  Yearly screening is appropriate.    My plan is as follows:  1. continue to work with sleep medicine to get MIGUEL treated appropriately  2. continue other medications, although we did drop his digoxin from twice a day dosing to once a day.  3. Provided he does well, follow up in 6 months with echo and ekg.  He will see electrophysiology at that visit.  At that time, I would also like to get a liver ultrasound as well as screening blood work (CMP, CBC, magnesium, AFP, GGT, TSH, ferritin, iron studies, BNP)    Thank you for referring this patient to our clinic. Please call with any questions.    Sincerely,        Dereje Acosta MD  Pediatric Cardiology  Adult Congenital Heart Disease  Pediatric Heart Failure and Transplantation  [facility] [address] [place]  [address]  [phone]

## 2018-03-05 RX ORDER — ENALAPRIL MALEATE 10 MG/1
TABLET ORAL
Qty: 60 TABLET | Refills: 3 | Status: SHIPPED | OUTPATIENT
Start: 2018-03-05 | End: 2018-08-14 | Stop reason: SDUPTHER

## 2018-03-20 NOTE — PROGRESS NOTES
The pt was last due for an INR on 11/17/17, which was missed.  I was able to reach him today and he re-scheduled to the lab STAT in the am on 3/23/2018.

## 2018-03-23 ENCOUNTER — ANTI-COAG VISIT (OUTPATIENT)
Dept: CARDIOLOGY | Facility: CLINIC | Age: 43
End: 2018-03-23

## 2018-03-23 ENCOUNTER — LAB VISIT (OUTPATIENT)
Dept: LAB | Facility: HOSPITAL | Age: 43
End: 2018-03-23
Attending: PEDIATRICS
Payer: COMMERCIAL

## 2018-03-23 DIAGNOSIS — Z98.890 S/P FONTAN PROCEDURE: ICD-10-CM

## 2018-03-23 DIAGNOSIS — Z79.01 LONG TERM CURRENT USE OF ANTICOAGULANT THERAPY: ICD-10-CM

## 2018-03-23 LAB
INR PPP: 2.3
PROTHROMBIN TIME: 24.4 SEC

## 2018-03-23 PROCEDURE — 85610 PROTHROMBIN TIME: CPT | Mod: PO

## 2018-03-23 PROCEDURE — 36415 COLL VENOUS BLD VENIPUNCTURE: CPT | Mod: PO

## 2018-04-30 ENCOUNTER — CLINICAL SUPPORT (OUTPATIENT)
Dept: PEDIATRIC CARDIOLOGY | Facility: CLINIC | Age: 43
End: 2018-04-30
Attending: PEDIATRICS
Payer: COMMERCIAL

## 2018-04-30 DIAGNOSIS — Z98.890 S/P FONTAN PROCEDURE: ICD-10-CM

## 2018-04-30 PROCEDURE — 93294 REM INTERROG EVL PM/LDLS PM: CPT | Mod: S$GLB,,, | Performed by: PEDIATRICS

## 2018-04-30 PROCEDURE — 93296 REM INTERROG EVL PM/IDS: CPT | Mod: S$GLB,,, | Performed by: PEDIATRICS

## 2018-05-03 DIAGNOSIS — I48.92 ATRIAL FLUTTER, UNSPECIFIED TYPE: Primary | ICD-10-CM

## 2018-05-03 DIAGNOSIS — Z95.0 PACEMAKER: ICD-10-CM

## 2018-05-04 NOTE — PROGRESS NOTES
Patient was scheduled for lab 5/4/18. Patient states he was unable to have lab drawn due to insurance issues. He rescheduled to 5/11/18.

## 2018-05-06 RX ORDER — SOTALOL HYDROCHLORIDE 80 MG/1
TABLET ORAL
Qty: 120 TABLET | Refills: 3 | Status: SHIPPED | OUTPATIENT
Start: 2018-05-06 | End: 2018-10-02 | Stop reason: SDUPTHER

## 2018-05-31 ENCOUNTER — TELEPHONE (OUTPATIENT)
Dept: PEDIATRIC CARDIOLOGY | Facility: CLINIC | Age: 43
End: 2018-05-31

## 2018-05-31 NOTE — TELEPHONE ENCOUNTER
Returned pts call. Apparently, [facility] is waiting on our PA. I explained to pt that I believed that PA was previously faxed, but that I would verify and manage it from here on out. I also asked pt about scheduling f/u appts this Summer. Agreed to July 26th for appts and requested earlier timing of appts.

## 2018-05-31 NOTE — TELEPHONE ENCOUNTER
Left message for pt to return call to confirm meds needed as well as to schedule multiple f/u appts including liver US.

## 2018-05-31 NOTE — TELEPHONE ENCOUNTER
----- Message from Lakshmi Stark sent at 5/31/2018  1:05 PM CDT -----  Contact: Marcello [phone]  Marcello returning two missed calls. A voice mail was not left or encounter. Please advise.

## 2018-06-06 ENCOUNTER — TELEPHONE (OUTPATIENT)
Dept: CARDIOLOGY | Facility: CLINIC | Age: 43
End: 2018-06-06

## 2018-06-06 NOTE — TELEPHONE ENCOUNTER
----- Message from Ashley Mendez sent at 6/6/2018 11:32 AM CDT -----  Contact: Shiva / Juan [phone] ref # [id number]  Needs Advice    Reason for call:    Unknown    Communication Preference: Shiva / Juan [phone]    Additional Information:    Shiva is calling to spk with the nurse regarding the pt. No other message. The nurse to need to make she mention the call is in regards to ref # [id number]

## 2018-06-06 NOTE — TELEPHONE ENCOUNTER
Shiva calling to notify us patient is approved for sildenafil for another year.  Will fax approval to us.

## 2018-07-03 RX ORDER — DIGOXIN 250 MCG
0.25 TABLET ORAL EVERY MORNING
Qty: 30 TABLET | Refills: 8 | Status: SHIPPED | OUTPATIENT
Start: 2018-07-03 | End: 2019-06-04 | Stop reason: SDUPTHER

## 2018-07-12 ENCOUNTER — PATIENT MESSAGE (OUTPATIENT)
Dept: ADMINISTRATIVE | Facility: OTHER | Age: 43
End: 2018-07-12

## 2018-07-26 ENCOUNTER — OFFICE VISIT (OUTPATIENT)
Dept: CARDIOLOGY | Facility: CLINIC | Age: 43
End: 2018-07-26
Payer: COMMERCIAL

## 2018-07-26 ENCOUNTER — HOSPITAL ENCOUNTER (OUTPATIENT)
Dept: CARDIOLOGY | Facility: CLINIC | Age: 43
Discharge: HOME | End: 2018-07-26
Payer: COMMERCIAL

## 2018-07-26 ENCOUNTER — HOSPITAL ENCOUNTER (OUTPATIENT)
Dept: CARDIOLOGY | Facility: CLINIC | Age: 43
Discharge: HOME | End: 2018-07-26
Attending: PEDIATRICS
Payer: COMMERCIAL

## 2018-07-26 VITALS
HEART RATE: 74 BPM | HEIGHT: 63 IN | BODY MASS INDEX: 29.1 KG/M2 | SYSTOLIC BLOOD PRESSURE: 114 MMHG | DIASTOLIC BLOOD PRESSURE: 55 MMHG | WEIGHT: 164.25 LBS | OXYGEN SATURATION: 88 %

## 2018-07-26 DIAGNOSIS — Z95.0 PACEMAKER: ICD-10-CM

## 2018-07-26 DIAGNOSIS — Q24.9 HEART FAILURE DUE TO CONGENITAL HEART DISEASE: ICD-10-CM

## 2018-07-26 DIAGNOSIS — Q20.3 TRICUSPID ATRESIA WITH D-TRANSPOSITION OF GREAT ARTERIES: Primary | ICD-10-CM

## 2018-07-26 DIAGNOSIS — R23.0 CYANOSIS: ICD-10-CM

## 2018-07-26 DIAGNOSIS — Q22.4 TRICUSPID ATRESIA WITH D-TRANSPOSITION OF GREAT ARTERIES: ICD-10-CM

## 2018-07-26 DIAGNOSIS — Q24.9 ADULT CONGENITAL HEART DISEASE: ICD-10-CM

## 2018-07-26 DIAGNOSIS — I48.92 ATRIAL FLUTTER, UNSPECIFIED TYPE: ICD-10-CM

## 2018-07-26 DIAGNOSIS — Q22.4 TRICUSPID ATRESIA WITH D-TRANSPOSITION OF GREAT ARTERIES: Primary | ICD-10-CM

## 2018-07-26 DIAGNOSIS — G47.33 OSA (OBSTRUCTIVE SLEEP APNEA): ICD-10-CM

## 2018-07-26 DIAGNOSIS — Q20.3 TRICUSPID ATRESIA WITH D-TRANSPOSITION OF GREAT ARTERIES: ICD-10-CM

## 2018-07-26 DIAGNOSIS — I50.9 HEART FAILURE DUE TO CONGENITAL HEART DISEASE: ICD-10-CM

## 2018-07-26 DIAGNOSIS — Z98.890 S/P FONTAN PROCEDURE: ICD-10-CM

## 2018-07-26 DIAGNOSIS — I48.4 ATYPICAL ATRIAL FLUTTER: ICD-10-CM

## 2018-07-26 LAB — MITRAL VALVE REGURGITATION: NORMAL

## 2018-07-26 PROCEDURE — 93320 DOPPLER ECHO COMPLETE: CPT | Mod: S$GLB,,, | Performed by: PEDIATRICS

## 2018-07-26 PROCEDURE — 93325 DOPPLER ECHO COLOR FLOW MAPG: CPT | Mod: S$GLB,,, | Performed by: PEDIATRICS

## 2018-07-26 PROCEDURE — 93000 ELECTROCARDIOGRAM COMPLETE: CPT | Mod: S$GLB,,, | Performed by: INTERNAL MEDICINE

## 2018-07-26 PROCEDURE — 99214 OFFICE O/P EST MOD 30 MIN: CPT | Mod: 25,S$GLB,, | Performed by: PEDIATRICS

## 2018-07-26 PROCEDURE — 93303 ECHO TRANSTHORACIC: CPT | Mod: S$GLB,,, | Performed by: PEDIATRICS

## 2018-07-26 PROCEDURE — 99999 PR PBB SHADOW E&M-EST. PATIENT-LVL III: CPT | Mod: PBBFAC,,, | Performed by: PEDIATRICS

## 2018-07-26 RX ORDER — METOPROLOL SUCCINATE 100 MG/1
300 TABLET, EXTENDED RELEASE ORAL DAILY
Qty: 90 TABLET | Refills: 9 | Status: SHIPPED | OUTPATIENT
Start: 2018-07-26 | End: 2019-07-23 | Stop reason: SDUPTHER

## 2018-07-26 NOTE — Clinical Note
6 month visit in [facility] with the following tests: echo, ekg Liver US Labs - cbc, cmp, afp, INR Pacemaker check Clinic visit with EP and me

## 2018-08-13 RX ORDER — WARFARIN 2 MG/1
TABLET ORAL
Qty: 35 TABLET | Refills: 6 | Status: SHIPPED | OUTPATIENT
Start: 2018-08-13 | End: 2019-01-08 | Stop reason: SDUPTHER

## 2018-08-14 RX ORDER — ENALAPRIL MALEATE 10 MG/1
TABLET ORAL
Qty: 60 TABLET | Refills: 11 | Status: SHIPPED | OUTPATIENT
Start: 2018-08-14 | End: 2019-10-04 | Stop reason: SDUPTHER

## 2018-08-20 RX ORDER — WARFARIN 6 MG/1
TABLET ORAL
Qty: 30 TABLET | Refills: 6 | Status: SHIPPED | OUTPATIENT
Start: 2018-08-20 | End: 2019-01-08 | Stop reason: SDUPTHER

## 2018-08-27 ENCOUNTER — CLINICAL SUPPORT (OUTPATIENT)
Dept: PEDIATRIC CARDIOLOGY | Facility: CLINIC | Age: 43
End: 2018-08-27
Attending: PEDIATRICS
Payer: COMMERCIAL

## 2018-08-27 DIAGNOSIS — Z95.0 PACEMAKER: ICD-10-CM

## 2018-08-27 DIAGNOSIS — I48.92 ATRIAL FLUTTER, UNSPECIFIED TYPE: ICD-10-CM

## 2018-08-27 PROCEDURE — 93296 REM INTERROG EVL PM/IDS: CPT | Mod: S$GLB,,, | Performed by: PEDIATRICS

## 2018-08-27 PROCEDURE — 93294 REM INTERROG EVL PM/LDLS PM: CPT | Mod: S$GLB,,, | Performed by: PEDIATRICS

## 2018-08-30 ENCOUNTER — PATIENT MESSAGE (OUTPATIENT)
Dept: PEDIATRIC CARDIOLOGY | Facility: CLINIC | Age: 43
End: 2018-08-30

## 2018-09-06 ENCOUNTER — PATIENT MESSAGE (OUTPATIENT)
Dept: PEDIATRIC CARDIOLOGY | Facility: CLINIC | Age: 43
End: 2018-09-06

## 2018-09-17 ENCOUNTER — PATIENT MESSAGE (OUTPATIENT)
Dept: CARDIOLOGY | Facility: CLINIC | Age: 43
End: 2018-09-17

## 2018-10-02 RX ORDER — SOTALOL HYDROCHLORIDE 80 MG/1
80 TABLET ORAL 2 TIMES DAILY
Qty: 120 TABLET | Refills: 5 | Status: SHIPPED | OUTPATIENT
Start: 2018-10-02 | End: 2018-11-08 | Stop reason: SDUPTHER

## 2018-10-24 ENCOUNTER — PATIENT MESSAGE (OUTPATIENT)
Dept: CARDIOLOGY | Facility: CLINIC | Age: 43
End: 2018-10-24

## 2018-11-08 ENCOUNTER — PATIENT MESSAGE (OUTPATIENT)
Dept: CARDIOLOGY | Facility: CLINIC | Age: 43
End: 2018-11-08

## 2018-11-08 RX ORDER — SOTALOL HYDROCHLORIDE 80 MG/1
160 TABLET ORAL 2 TIMES DAILY
Qty: 120 TABLET | Refills: 5 | Status: SHIPPED | OUTPATIENT
Start: 2018-11-08 | End: 2019-03-14

## 2018-11-26 ENCOUNTER — CLINICAL SUPPORT (OUTPATIENT)
Dept: PEDIATRIC CARDIOLOGY | Facility: CLINIC | Age: 43
End: 2018-11-26
Attending: PEDIATRICS
Payer: COMMERCIAL

## 2018-11-26 DIAGNOSIS — Q24.9 ADULT CONGENITAL HEART DISEASE: ICD-10-CM

## 2018-11-26 DIAGNOSIS — Z95.0 CARDIAC PACEMAKER IN SITU: ICD-10-CM

## 2018-11-26 DIAGNOSIS — I48.92 ATRIAL FLUTTER, UNSPECIFIED TYPE: ICD-10-CM

## 2018-11-26 PROCEDURE — 93294 REM INTERROG EVL PM/LDLS PM: CPT | Mod: S$GLB,,, | Performed by: PEDIATRICS

## 2018-11-26 PROCEDURE — 93296 REM INTERROG EVL PM/IDS: CPT | Mod: S$GLB,,, | Performed by: PEDIATRICS

## 2018-11-27 DIAGNOSIS — Z95.0 CARDIAC PACEMAKER IN SITU: ICD-10-CM

## 2018-11-27 DIAGNOSIS — I48.92 ATRIAL FLUTTER, UNSPECIFIED TYPE: Primary | ICD-10-CM

## 2018-11-27 DIAGNOSIS — Q24.9 ADULT CONGENITAL HEART DISEASE: ICD-10-CM

## 2018-12-16 RX ORDER — FUROSEMIDE 20 MG/1
20 TABLET ORAL DAILY
Qty: 60 TABLET | Refills: 6 | Status: SHIPPED | OUTPATIENT
Start: 2018-12-16 | End: 2023-09-07 | Stop reason: SDUPTHER

## 2018-12-31 LAB
AV DELAY - LONGEST: 300 MSEC
BATTERY VOLTAGE (V): 2.92 V
IMPEDANCE RA LEAD (NATIVE): 380 OHMS
IMPEDANCE RA LEAD: 310 OHMS
OHS CV DC PP MS1: 1.5 MS
OHS CV DC PP MS2: 0.5 MS
OHS CV DC PP V1: 2.5 V
OHS CV DC PP V2: 3 V
PV DELAY - LONGEST: 300 MSEC

## 2019-01-08 RX ORDER — WARFARIN 2 MG/1
TABLET ORAL
Qty: 35 TABLET | Refills: 11 | Status: SHIPPED | OUTPATIENT
Start: 2019-01-08 | End: 2020-04-01 | Stop reason: SDUPTHER

## 2019-01-08 RX ORDER — WARFARIN 6 MG/1
TABLET ORAL
Qty: 30 TABLET | Refills: 11 | Status: SHIPPED | OUTPATIENT
Start: 2019-01-08 | End: 2020-04-01 | Stop reason: SDUPTHER

## 2019-01-09 DIAGNOSIS — I27.29 PULMONARY ARTERIAL HYPERTENSION ASSOCIATED WITH CONGENITAL HEART DISEASE: ICD-10-CM

## 2019-01-09 DIAGNOSIS — I27.20 PULMONARY HYPERTENSION: ICD-10-CM

## 2019-01-09 DIAGNOSIS — Z98.890 S/P FONTAN PROCEDURE: Primary | ICD-10-CM

## 2019-01-09 DIAGNOSIS — Q24.9 PULMONARY ARTERIAL HYPERTENSION ASSOCIATED WITH CONGENITAL HEART DISEASE: ICD-10-CM

## 2019-01-09 RX ORDER — SILDENAFIL CITRATE 20 MG/1
TABLET ORAL
Qty: 180 TABLET | Refills: 3 | Status: SHIPPED | OUTPATIENT
Start: 2019-01-09 | End: 2020-11-09 | Stop reason: SDUPTHER

## 2019-01-09 RX ORDER — SILDENAFIL CITRATE 20 MG/1
TABLET ORAL
Qty: 180 TABLET | Refills: 3 | Status: SHIPPED | OUTPATIENT
Start: 2019-01-09 | End: 2019-03-14 | Stop reason: SDUPTHER

## 2019-01-25 ENCOUNTER — TELEPHONE (OUTPATIENT)
Dept: PEDIATRIC CARDIOLOGY | Facility: CLINIC | Age: 44
End: 2019-01-25

## 2019-01-25 NOTE — TELEPHONE ENCOUNTER
Spoke with Marcello, made appointment for liver US, ekg, echo, blood work, Dr Acosta and Dr Torres on March 14th, 2019 start time 9:30 am. Will mail out appointment slip with address and callback number

## 2019-02-05 ENCOUNTER — ANTI-COAG VISIT (OUTPATIENT)
Dept: CARDIOLOGY | Facility: CLINIC | Age: 44
End: 2019-02-05

## 2019-02-05 DIAGNOSIS — Z98.890 S/P FONTAN PROCEDURE: ICD-10-CM

## 2019-02-05 DIAGNOSIS — Z79.01 LONG TERM CURRENT USE OF ANTICOAGULANT THERAPY: ICD-10-CM

## 2019-03-13 ENCOUNTER — PATIENT MESSAGE (OUTPATIENT)
Dept: ADMINISTRATIVE | Facility: OTHER | Age: 44
End: 2019-03-13

## 2019-03-14 ENCOUNTER — HOSPITAL ENCOUNTER (OUTPATIENT)
Dept: CARDIOLOGY | Facility: CLINIC | Age: 44
Discharge: HOME | End: 2019-03-14
Attending: PEDIATRICS
Payer: COMMERCIAL

## 2019-03-14 ENCOUNTER — OFFICE VISIT (OUTPATIENT)
Dept: CARDIOLOGY | Facility: CLINIC | Age: 44
End: 2019-03-14
Payer: COMMERCIAL

## 2019-03-14 ENCOUNTER — HOSPITAL ENCOUNTER (OUTPATIENT)
Dept: RADIOLOGY | Facility: HOSPITAL | Age: 44
Discharge: HOME | End: 2019-03-14
Attending: PEDIATRICS
Payer: COMMERCIAL

## 2019-03-14 ENCOUNTER — CLINICAL SUPPORT (OUTPATIENT)
Dept: PEDIATRIC CARDIOLOGY | Facility: CLINIC | Age: 44
End: 2019-03-14
Attending: PEDIATRICS
Payer: COMMERCIAL

## 2019-03-14 ENCOUNTER — CLINICAL SUPPORT (OUTPATIENT)
Dept: CARDIOLOGY | Facility: CLINIC | Age: 44
End: 2019-03-14
Attending: PEDIATRICS
Payer: COMMERCIAL

## 2019-03-14 VITALS — HEIGHT: 63 IN | WEIGHT: 164 LBS | HEART RATE: 74 BPM | BODY MASS INDEX: 29.06 KG/M2

## 2019-03-14 VITALS
OXYGEN SATURATION: 90 % | HEIGHT: 63 IN | HEART RATE: 75 BPM | SYSTOLIC BLOOD PRESSURE: 121 MMHG | WEIGHT: 166.69 LBS | BODY MASS INDEX: 29.54 KG/M2 | WEIGHT: 166.69 LBS | DIASTOLIC BLOOD PRESSURE: 61 MMHG | BODY MASS INDEX: 29.54 KG/M2 | DIASTOLIC BLOOD PRESSURE: 61 MMHG | HEART RATE: 75 BPM | HEIGHT: 63 IN | SYSTOLIC BLOOD PRESSURE: 121 MMHG

## 2019-03-14 DIAGNOSIS — Q22.4 TRICUSPID ATRESIA WITH D-TRANSPOSITION OF GREAT ARTERIES: ICD-10-CM

## 2019-03-14 DIAGNOSIS — I49.9 VENTRICULAR ARRHYTHMIA: ICD-10-CM

## 2019-03-14 DIAGNOSIS — Q24.9 ADULT CONGENITAL HEART DISEASE: ICD-10-CM

## 2019-03-14 DIAGNOSIS — Z95.0 CARDIAC PACEMAKER IN SITU: ICD-10-CM

## 2019-03-14 DIAGNOSIS — Z98.890 S/P FONTAN PROCEDURE: ICD-10-CM

## 2019-03-14 DIAGNOSIS — I50.9 HEART FAILURE DUE TO CONGENITAL HEART DISEASE: ICD-10-CM

## 2019-03-14 DIAGNOSIS — Q24.9 HEART FAILURE DUE TO CONGENITAL HEART DISEASE: ICD-10-CM

## 2019-03-14 DIAGNOSIS — Z95.0 PACEMAKER: ICD-10-CM

## 2019-03-14 DIAGNOSIS — Q25.42 VSD (VENTRICULAR SEPTAL DEFECT AND AORTIC ARCH HYPOPLASIA: ICD-10-CM

## 2019-03-14 DIAGNOSIS — R23.0 CYANOSIS: ICD-10-CM

## 2019-03-14 DIAGNOSIS — Q20.3 TRICUSPID ATRESIA WITH D-TRANSPOSITION OF GREAT ARTERIES: ICD-10-CM

## 2019-03-14 DIAGNOSIS — R23.0 CYANOSIS: Primary | ICD-10-CM

## 2019-03-14 DIAGNOSIS — I49.9 VENTRICULAR ARRHYTHMIA: Primary | ICD-10-CM

## 2019-03-14 DIAGNOSIS — G47.33 OSA (OBSTRUCTIVE SLEEP APNEA): ICD-10-CM

## 2019-03-14 DIAGNOSIS — Q21.0 VSD (VENTRICULAR SEPTAL DEFECT AND AORTIC ARCH HYPOPLASIA: ICD-10-CM

## 2019-03-14 DIAGNOSIS — I48.92 ATRIAL FLUTTER, UNSPECIFIED TYPE: ICD-10-CM

## 2019-03-14 DIAGNOSIS — I47.20 VT (VENTRICULAR TACHYCARDIA): ICD-10-CM

## 2019-03-14 LAB
AV INDEX (PROSTH): 0.75
AV MEAN GRADIENT: 3 MMHG
AV PEAK GRADIENT: 5.38 MMHG
AV VELOCITY RATIO: 0.57
BSA FOR ECHO PROCEDURE: 1.82 M2
CV ECHO LV RWT: 0.22 CM
DOP CALC AO PEAK VEL: 1.16 M/S
DOP CALC AO VTI: 19.32 CM
DOP CALC LVOT PEAK VEL: 0.66 M/S
DOP CALCLVOT PEAK VEL VTI: 14.41 CM
ECHO LV POSTERIOR WALL: 0.91 CM (ref 0.6–1.1)
FRACTIONAL SHORTENING: 16 % (ref 28–44)
INTERVENTRICULAR SEPTUM: 0.95 CM (ref 0.6–1.1)
LA MAJOR: 5.92 CM
LEFT ATRIUM SIZE: 5.16 CM
LEFT INTERNAL DIMENSION IN SYSTOLE: 7.06 CM (ref 2.1–4)
LEFT VENTRICLE DIASTOLIC VOLUME INDEX: 214.75 ML/M2
LEFT VENTRICLE DIASTOLIC VOLUME: 381.69 ML
LEFT VENTRICLE MASS INDEX: 227.5 G/M2
LEFT VENTRICLE SYSTOLIC VOLUME INDEX: 146.7 ML/M2
LEFT VENTRICLE SYSTOLIC VOLUME: 260.77 ML
LEFT VENTRICULAR INTERNAL DIMENSION IN DIASTOLE: 8.38 CM (ref 3.5–6)
LEFT VENTRICULAR MASS: 404.34 G
MV PEAK E VEL: 0.75 M/S
PV PEAK VELOCITY: 1.07 CM/S

## 2019-03-14 PROCEDURE — 99214 PR OFFICE/OUTPT VISIT, EST, LEVL IV, 30-39 MIN: ICD-10-PCS | Mod: 25,S$GLB,, | Performed by: PEDIATRICS

## 2019-03-14 PROCEDURE — 99999 PR PBB SHADOW E&M-EST. PATIENT-LVL I: ICD-10-PCS | Mod: PBBFAC,,,

## 2019-03-14 PROCEDURE — 93975 VASCULAR STUDY: CPT | Mod: TC

## 2019-03-14 PROCEDURE — 76705 ECHO EXAM OF ABDOMEN: CPT | Mod: 26,59,, | Performed by: RADIOLOGY

## 2019-03-14 PROCEDURE — 99999 PR PBB SHADOW E&M-EST. PATIENT-LVL I: CPT | Mod: PBBFAC,,,

## 2019-03-14 PROCEDURE — 93000 ELECTROCARDIOGRAM COMPLETE: CPT | Mod: S$GLB,,, | Performed by: INTERNAL MEDICINE

## 2019-03-14 PROCEDURE — 99999 PR PBB SHADOW E&M-EST. PATIENT-LVL III: CPT | Mod: PBBFAC,,, | Performed by: PEDIATRICS

## 2019-03-14 PROCEDURE — 93227 XTRNL ECG REC<48 HR R&I: CPT | Mod: S$GLB,,, | Performed by: PEDIATRICS

## 2019-03-14 PROCEDURE — 3008F BODY MASS INDEX DOCD: CPT | Mod: CPTII,S$GLB,, | Performed by: PEDIATRICS

## 2019-03-14 PROCEDURE — 3008F PR BODY MASS INDEX (BMI) DOCUMENTED: ICD-10-PCS | Mod: CPTII,S$GLB,, | Performed by: PEDIATRICS

## 2019-03-14 PROCEDURE — 93303 TRANSTHORACIC ECHO (TTE) COMPLETE: ICD-10-PCS | Mod: S$GLB,,, | Performed by: PEDIATRICS

## 2019-03-14 PROCEDURE — 93975 VASCULAR STUDY: CPT | Mod: 26,,, | Performed by: RADIOLOGY

## 2019-03-14 PROCEDURE — 93280 PM DEVICE PROGR EVAL DUAL: CPT | Mod: S$GLB,,, | Performed by: PEDIATRICS

## 2019-03-14 PROCEDURE — 93000 EKG 12-LEAD: ICD-10-PCS | Mod: S$GLB,,, | Performed by: INTERNAL MEDICINE

## 2019-03-14 PROCEDURE — 93280 CV PACEMAKER PROGRAMMING PEDIATRICS (CUPID ONLY): ICD-10-PCS | Mod: S$GLB,,, | Performed by: PEDIATRICS

## 2019-03-14 PROCEDURE — 93227: ICD-10-PCS | Mod: S$GLB,,, | Performed by: PEDIATRICS

## 2019-03-14 PROCEDURE — 99214 OFFICE O/P EST MOD 30 MIN: CPT | Mod: 25,S$GLB,, | Performed by: PEDIATRICS

## 2019-03-14 PROCEDURE — 99999 PR PBB SHADOW E&M-EST. PATIENT-LVL IV: CPT | Mod: PBBFAC,,, | Performed by: PEDIATRICS

## 2019-03-14 PROCEDURE — 99999 PR PBB SHADOW E&M-EST. PATIENT-LVL IV: ICD-10-PCS | Mod: PBBFAC,,, | Performed by: PEDIATRICS

## 2019-03-14 PROCEDURE — 76705 US LIVER WITH DOPPLER: ICD-10-PCS | Mod: 26,59,, | Performed by: RADIOLOGY

## 2019-03-14 PROCEDURE — 99999 PR PBB SHADOW E&M-EST. PATIENT-LVL III: ICD-10-PCS | Mod: PBBFAC,,, | Performed by: PEDIATRICS

## 2019-03-14 PROCEDURE — 93303 ECHO TRANSTHORACIC: CPT | Mod: S$GLB,,, | Performed by: PEDIATRICS

## 2019-03-14 PROCEDURE — 93975 US LIVER WITH DOPPLER: ICD-10-PCS | Mod: 26,,, | Performed by: RADIOLOGY

## 2019-03-14 RX ORDER — SOTALOL HYDROCHLORIDE 240 MG/1
240 TABLET ORAL 2 TIMES DAILY
Qty: 60 TABLET | Refills: 11 | Status: SHIPPED | OUTPATIENT
Start: 2019-03-14 | End: 2020-05-12 | Stop reason: SDUPTHER

## 2019-03-14 NOTE — LETTER
March 17, 2019        Kevin Chaves MD  [address]  [address]  [place] [address]             [facility]- Cardiology  [address]  [place] [address]  Phone: [phone]   Patient: Marcello Briggs   MR Number: [id number]   YOB: 1975   Date of Visit: 3/14/2019       Dear Dr. Chaves:    Thank you for referring Marcello Briggs to me for evaluation. Attached you will find relevant portions of my assessment and plan of care.    If you have questions, please do not hesitate to call me. I look forward to following Marcello Briggs along with you.    Sincerely,      David Torres MD            CC  No Recipients    Enclosure
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
## 2019-03-15 NOTE — PROGRESS NOTES
"03/14/2019    Patient Name: LEVON GANDHI  YOB: 1975  [facility] Number: [id number]    Kevin Chaves MD  [address]  [place] [address]    MIRIAM Garcia MD  [address]   [address]  [place] [address]    Dear Dr. Chaves and Jose:    It is a pleasure to see Levon at our main campus adult congenital cardiology clinic to evaluate tricuspid atresia.    Levon is a 43 y.o. Male with a complex past medical history. To summarize, his diagnoses are as follows:  1. 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   - History of a classic Roger shunt and a 12 mm conduit from the right atrium to the left pulmonary artery along with a Damus Judy Stansel operation - now with mild native and brigette-aortic insufficiency   - lateral tunnel Fontan with a 16 mm ring Goretex graft from the left pulmonary artery to the right pulmonary artery.   - elevated central venous pressures with mild desaturation at least partially due to collateral vessels - improved after cath but still an issue.   - mild mitral regurgitation    - Last liver US 2016  2. 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     - arrhythmias typically manifest as "back pain"   - nonsustained VT  3. Varicose veins followed by vascular medicine - improved.  4. No evidence of protein-losing enteropathy.    5. obstructive sleep apnea    Discussion:  In general, he appears to be at baseline.   Obstructive sleep apnea must be completely treated as it likely affects his quality of life and could worsen his PVR and Fontan function.  He remains at risk for protein losing enteropathy and liver disease (including hepatocellular carcinoma).  He is overdue for a liver US.  He is at risk for arrhythmia and heart failure.  He is at significant risk for thrombus formation.      My plan is as " "follows:  1. continue to work with sleep medicine to get MIGUEL treated appropriately  2. continue other medications.  EP plans to increase the sotalol and repeat an EKG in a few weeks.  3. Follow up in 6 months with echo and ekg.      Interval history:  He denies chest pain, shortness of breath, worsening dyspnea on exertion, diarrhea, productive cough.  He denies any syncope or near syncope.  He takes his lasix no more than once per week.  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  Of note, he typically feels his arrhythmias as "back pain".  He hasn't had any of this type of back pain since I last saw him.  He did have a day of positional dizziness about a week ago.     On July 7, 2010 he underwent a cardiac catheterization:   1. Complex univentricular cardiac defect with superior inferior ventricles with double inlet left ventricle (atrioventricular connection not defined at catheterization), status post Vucck-Aajy-Wrrzqfl connection and intracardiac lateral tunnel Fontan.   2. Moderate neoaortic (pulmonary) valve insufficiency.   3. Left aorta.   4. 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   5. Elevated central venous pressure (mean 19), transpulmonary gradient 4-5 mmHg.    Past Medical History:   Diagnosis Date    Asthma     Atrial flutter     Cyanosis     Heart murmur     Intra-atrial reentrant tachycardia     MIGUEL (obstructive sleep apnea) 10/23/2014    TGA (transposition of great arteries)     Tricuspid atresia     VSD (ventricular septal defect)      Past Surgical History:   Procedure Laterality Date    CARDIAC CATHETERIZATION      FONTAN PROCEDURE, EXTRACARDIAC      NONINVASIVE ELECTROPHYSIOLOGY TESTING (NIEPS) N/A 4/6/2016    [facility]    REPLACEMENT-GENERATOR-PACEMAKER N/A 1/22/2016    Performed by [facility] at [facility] CATH LAB     Family History   Problem " Relation Age of Onset    No Known Problems Mother     No Known Problems Father     No Known Problems Sister     No Known Problems Brother     No Known Problems Maternal Grandmother     Heart disease Maternal Grandfather     No Known Problems Paternal Grandmother     Diabetes Paternal Grandfather     No Known Problems Maternal Aunt     No Known Problems Maternal Uncle     Diabetes Paternal Aunt     No Known Problems Paternal Uncle     Atrial Septal Defect Neg Hx     Anemia Neg Hx     Arrhythmia Neg Hx     Asthma Neg Hx     Clotting disorder Neg Hx     Fainting Neg Hx     Heart attack Neg Hx     Heart failure Neg Hx     Hyperlipidemia Neg Hx     Hypertension Neg Hx     Stroke Neg Hx      Social History     Socioeconomic History    Marital status:      Spouse name: None    Number of children: None    Years of education: None    Highest education level: None   Social Needs    Financial resource strain: None    Food insecurity - worry: None    Food insecurity - inability: None    Transportation needs - medical: None    Transportation needs - non-medical: None   Occupational History    None   Tobacco Use    Smoking status: Never Smoker    Smokeless tobacco: Never Used   Substance and Sexual Activity    Alcohol use: No    Drug use: No    Sexual activity: None   Other Topics Concern    None   Social History Narrative    He is working at [employer].     Current Outpatient Medications on File Prior to Visit   Medication Sig Dispense Refill    digoxin (LANOXIN) 250 mcg tablet TAKE 1 TABLET (0.25 MG TOTAL) BY MOUTH EVERY MORNING. 30 tablet 8    enalapril (VASOTEC) 10 MG tablet TAKE 1 TABLET BY MOUTH TWICE A DAY 60 tablet 11    furosemide (LASIX) 20 MG tablet TAKE 1 TABLET (20 MG TOTAL) BY MOUTH ONCE DAILY. (Patient taking differently: Take 20 mg by mouth as needed. ) 60 tablet 6    metoprolol succinate (TOPROL-XL) 100 MG 24 hr tablet TAKE 3 TABLETS (300 MG TOTAL) BY MOUTH  "ONCE DAILY. 90 tablet 9    sildenafil (REVATIO) 20 mg Tab TAKE 1 TABLET BY MOUTH TWICE A  tablet 3    warfarin (COUMADIN) 2 MG tablet Take 10 mg (6mg + 2 (2mg) tabs every Saturday and 8mg (6mg +2 mg) all other days as directed 35 tablet 11    warfarin (COUMADIN) 6 MG tablet TAKE 10MG (6MG + 2 (2MG) TABS EVERY SATURDAY AND 8MG (6MG + 2MG) TABS ALL OTHER DAYS AS DIRECTED 30 tablet 11    [DISCONTINUED] sotalol (SOTALOL AF) 80 MG tablet Take 2 tablets (160 mg total) by mouth 2 (two) times daily. 120 tablet 5    [DISCONTINUED] sildenafil (REVATIO) 20 mg Tab TAKE 1 TABLET BY MOUTH TWICE A  tablet 3     No current facility-administered medications on file prior to visit.      Review of patient's allergies indicates:  No Known Allergies     /61 (BP Location: Left arm, Patient Position: Sitting, BP Method: Large (Automatic))   Pulse 75   Ht 5' 3" (1.6 m)   Wt 75.6 kg (166 lb 10.7 oz)   SpO2 (!) 90%   BMI 29.52 kg/m²   Weight: 75.6 kg (166 lb 10.7 oz)     In general, his baseline ruddiness is stable.  He is a white male in no apparent distress. Head is normocephalic and atraumatic. The eyes, nares, and oropharynx are clear. The neck is supple without jugular venous distention or lymphadenopathy, and his face does not look swollen. Lungs are clear to auscultation bilaterally. The chest is symmetrical. Multiple well-healed surgical scars are noted. The pacemaker is palpated in the right upper chest. The area is nontender without evidence of infection. The precordium is quiet. First heart sound is normal. A loud single second heart sound is auscultated. A grade 1/6 systolic ejection murmur is auscultated. No diastolic murmurs or gallops are heard. The abdominal exam reveals a liver edge palpated about 2 cm below the right costal margin. It is not pulsatile. He abdomen is soft and nontender without evidence of ascites. I could not palpate his spleen. There is no significant clubbing and no obvious " cyanosis. He has good pulses in his legs bilaterally. There is trivial edema in the right leg. He does have varicose veins.  No palpable cords or calf tenderness.    I personally reviewed the following studies:    His echo reveals:  Technically difficult suprasternal imaging.  Images consistent with tricuspid atresia, d-TGA, VSD and RV hypoplasia S/P DKS and lateral tunnel Fontan -  Dilated inferior vena cava and hepatic veins joining lateral tunnel Fontan and laminar flow with no obvious thrombus in IVC, hepatic veins or proximal lateral tunnel.  There is laminar flow in the superior vena cava with no obvious obstruction.    Remaining segments of Fontan circuit and pulmonary arteries not well demonstrated.  Small functional right atrium.  Unrestricted bidirectional flow at atrial septum.   At least moderate left atrial enlargement.   Posterior leaflet of mitral valve demonstrated well with cleft like appearance and central jet of insufficiency arising at this abnormality.  Mild to moderate mitral valve insufficiency.   Dilated left ventricle - severe.   Qualitatively reduced left (single) ventricle systolic function - EF estimated 45 - 50% from apical views.   Unrestricted flow at VSD to anterior aorta and small subaortic chamber (Right ventricle).   Anterior native aortic valve with no stenosis and mild insufficiency.   Posteriorly positioned pulmonary valve with no left ventricular outflow obstruction, mild insufficiency and no valvar stenosis.   Good imaging of the DKS anastomosis with no evidence of obstruction in either limb.    No pericardial effusion.    His EKG reveals AV sequential pacing with a long CO interval and good capture.      Results for LEVON GANDHI (MRN [id number]) as of 3/14/2019 21:14   Ref. Range 3/14/2019 11:38   AFP Latest Ref Range: 0.0 - 8.4 ng/mL 2.0     Lab Results   Component Value Date    INR 3.2 (H) 03/14/2019    INR 2.3 (H) 07/26/2018    INR 2.3 (H) 03/23/2018     Lab  Results   Component Value Date    WBC 4.24 03/14/2019    HGB 16.8 03/14/2019    HCT 50.1 03/14/2019    MCV 92 03/14/2019    PLT 94 (L) 03/14/2019     CMP  Sodium   Date Value Ref Range Status   03/14/2019 139 136 - 145 mmol/L Final     Potassium   Date Value Ref Range Status   03/14/2019 4.5 3.5 - 5.1 mmol/L Final     Chloride   Date Value Ref Range Status   03/14/2019 102 95 - 110 mmol/L Final     CO2   Date Value Ref Range Status   03/14/2019 30 (H) 23 - 29 mmol/L Final     Glucose   Date Value Ref Range Status   03/14/2019 85 70 - 110 mg/dL Final     BUN, Bld   Date Value Ref Range Status   03/14/2019 15 6 - 20 mg/dL Final     Creatinine   Date Value Ref Range Status   03/14/2019 0.9 0.5 - 1.4 mg/dL Final     Calcium   Date Value Ref Range Status   03/14/2019 9.8 8.7 - 10.5 mg/dL Final     Total Protein   Date Value Ref Range Status   03/14/2019 7.2 6.0 - 8.4 g/dL Final     Albumin   Date Value Ref Range Status   03/14/2019 4.2 3.5 - 5.2 g/dL Final     Total Bilirubin   Date Value Ref Range Status   03/14/2019 1.1 (H) 0.1 - 1.0 mg/dL Final     Comment:     For infants and newborns, interpretation of results should be based  on gestational age, weight and in agreement with clinical  observations.  Premature Infant recommended reference ranges:  Up to 24 hours.............<8.0 mg/dL  Up to 48 hours............<12.0 mg/dL  3-5 days..................<15.0 mg/dL  6-29 days.................<15.0 mg/dL       Alkaline Phosphatase   Date Value Ref Range Status   03/14/2019 79 55 - 135 U/L Final     AST   Date Value Ref Range Status   03/14/2019 30 10 - 40 U/L Final     ALT   Date Value Ref Range Status   03/14/2019 27 10 - 44 U/L Final     Anion Gap   Date Value Ref Range Status   03/14/2019 7 (L) 8 - 16 mmol/L Final     eGFR if    Date Value Ref Range Status   03/14/2019 >60.0 >60 mL/min/1.73 m^2 Final     eGFR if non    Date Value Ref Range Status   03/14/2019 >60.0 >60 mL/min/1.73 m^2  Final     Comment:     Calculation used to obtain the estimated glomerular filtration  rate (eGFR) is the CKD-EPI equation.        Liver US:  Hepatomegaly  Splenomegaly  Distension of the hepatic veins and  the inferior vena cava mildly suggesting early right-sided heart failure  Appropriate hepatic vascular flow    Thank you for referring this patient to our clinic. Please call with any questions.    Sincerely,        Dereje Acosta MD  Pediatric Cardiology  Adult Congenital Heart Disease  Pediatric Heart Failure and Transplantation  [facility] [address] [place]  [address]  [phone]

## 2019-03-19 ENCOUNTER — TELEPHONE (OUTPATIENT)
Dept: PEDIATRIC CARDIOLOGY | Facility: CLINIC | Age: 44
End: 2019-03-19

## 2019-03-19 NOTE — TELEPHONE ENCOUNTER
----- Message from Nelli Sigala sent at 3/19/2019  1:11 PM CDT -----  Contact: Lois/[facility] [phone]  Reason for call: Patient unaware that he need an EKG        Communication Preference: Lois/[facility] [phone]    Additional Information: Lois states patient was unaware that he needed an EKG done in their office. She is requesting a call back to discuss further tomorrow.

## 2019-03-19 NOTE — TELEPHONE ENCOUNTER
[facility] number if further clarification needed. Sotalol increased at recent visit with Dr. Torres. Pt was to follow up with PCP and have a repeat EKG done.

## 2019-03-21 ENCOUNTER — TELEPHONE (OUTPATIENT)
Dept: PEDIATRIC CARDIOLOGY | Facility: CLINIC | Age: 44
End: 2019-03-21

## 2019-03-21 NOTE — TELEPHONE ENCOUNTER
----- Message from Nasrin Henning sent at 3/21/2019  2:13 PM CDT -----  Contact: Lois [phone]  Needs Advice    Reason for call: PCP f/u        Communication Preference: lois [phone]    Additional Information: Lois states patient was unaware that he needed an EKG done in their office. She is requesting a call back to discuss further

## 2019-03-21 NOTE — TELEPHONE ENCOUNTER
Lm with call back number regarding patient needing EKG. Per last clinic note, pt needed EKG after increasing medications. Will attempt to contact pt

## 2019-03-21 NOTE — TELEPHONE ENCOUNTER
Lm additional message stating that patient was contacted and understands need to return to PCP to obtain follow up EKG

## 2019-03-21 NOTE — TELEPHONE ENCOUNTER
Spoke with patient to make sure patient was aware that he needed to get a repeat EKG with his primary care after increasing sotalol. Pt stated he just picked up new Rx last night. Instructed patient to get EKG 1st week of April. Pt verbalized understanding.

## 2019-03-22 LAB
OHS CV EVENT MONITOR DAY: 0
OHS CV HOLTER LENGTH DECIMAL HOURS: 22
OHS CV HOLTER LENGTH HOURS: 22
OHS CV HOLTER LENGTH MINUTES: 0

## 2019-03-31 LAB
AV DELAY - LONGEST: 300 MSEC
BATTERY VOLTAGE (V): 2.92 V
IMPEDANCE RA LEAD (NATIVE): 380 OHMS
IMPEDANCE RA LEAD: 310 OHMS
OHS CV DC PP MS1: 1.5 MS
OHS CV DC PP MS2: 0.5 MS
OHS CV DC PP V1: 2.5 V
OHS CV DC PP V2: 3 V
P/R-WAVE RA LEAD (NATIVE): 10 MV
PV DELAY - LONGEST: 300 MSEC
THRESHOLD MS RA LEAD (NATIVE): 0.5 MS
THRESHOLD MS RA LEAD: 1.5 MS
THRESHOLD V RA LEAD (NATIVE): 1.5 V
THRESHOLD V RA LEAD: 0.75 V

## 2019-04-21 RX ORDER — SOTALOL HYDROCHLORIDE 80 MG/1
TABLET ORAL
Qty: 120 TABLET | Refills: 4 | Status: SHIPPED | OUTPATIENT
Start: 2019-04-21 | End: 2019-10-16 | Stop reason: SDUPTHER

## 2019-06-05 RX ORDER — DIGOXIN 250 MCG
0.25 TABLET ORAL EVERY MORNING
Qty: 30 TABLET | Refills: 6 | Status: SHIPPED | OUTPATIENT
Start: 2019-06-05 | End: 2019-08-08 | Stop reason: SDUPTHER

## 2019-06-14 ENCOUNTER — PATIENT MESSAGE (OUTPATIENT)
Dept: ADMINISTRATIVE | Facility: OTHER | Age: 44
End: 2019-06-14

## 2019-06-17 ENCOUNTER — CLINICAL SUPPORT (OUTPATIENT)
Dept: PEDIATRIC CARDIOLOGY | Facility: CLINIC | Age: 44
End: 2019-06-17
Attending: PEDIATRICS
Payer: COMMERCIAL

## 2019-06-17 DIAGNOSIS — Z95.0 CARDIAC PACEMAKER IN SITU: ICD-10-CM

## 2019-06-17 DIAGNOSIS — Q24.9 ADULT CONGENITAL HEART DISEASE: ICD-10-CM

## 2019-06-17 DIAGNOSIS — I48.92 ATRIAL FLUTTER, UNSPECIFIED TYPE: ICD-10-CM

## 2019-06-17 PROCEDURE — 93296 CV PACEMAKER REMOTE PEDIATRICS (CUPID ONLY): ICD-10-PCS | Mod: S$GLB,,, | Performed by: PEDIATRICS

## 2019-06-17 PROCEDURE — 93296 REM INTERROG EVL PM/IDS: CPT | Mod: S$GLB,,, | Performed by: PEDIATRICS

## 2019-06-17 PROCEDURE — 93294 REM INTERROG EVL PM/LDLS PM: CPT | Mod: S$GLB,,, | Performed by: PEDIATRICS

## 2019-06-17 PROCEDURE — 93294 CV PACEMAKER REMOTE PEDIATRICS (CUPID ONLY): ICD-10-PCS | Mod: S$GLB,,, | Performed by: PEDIATRICS

## 2019-07-25 RX ORDER — METOPROLOL SUCCINATE 100 MG/1
300 TABLET, EXTENDED RELEASE ORAL DAILY
Qty: 270 TABLET | Refills: 3 | Status: SHIPPED | OUTPATIENT
Start: 2019-07-25 | End: 2020-10-01 | Stop reason: SDUPTHER

## 2019-07-31 LAB
AV DELAY - LONGEST: 300 MSEC
BATTERY VOLTAGE (V): 2.89 V
IMPEDANCE RA LEAD (NATIVE): 360 OHMS
IMPEDANCE RA LEAD: 290 OHMS
OHS CV DC PP MS1: 1.5 MS
OHS CV DC PP MS2: 0.5 MS
OHS CV DC PP V1: 2.5 V
OHS CV DC PP V2: 3 V
P/R-WAVE RA LEAD (NATIVE): 9.5 MV
P/R-WAVE RA LEAD: 0.5 MV
PV DELAY - LONGEST: 300 MSEC

## 2019-08-08 RX ORDER — DIGOXIN 250 MCG
0.25 TABLET ORAL EVERY MORNING
Qty: 90 TABLET | Refills: 3 | Status: SHIPPED | OUTPATIENT
Start: 2019-08-08 | End: 2020-07-25 | Stop reason: SDUPTHER

## 2019-09-23 ENCOUNTER — CLINICAL SUPPORT (OUTPATIENT)
Dept: PEDIATRIC CARDIOLOGY | Facility: CLINIC | Age: 44
End: 2019-09-23
Attending: PEDIATRICS
Payer: COMMERCIAL

## 2019-09-23 DIAGNOSIS — Z95.0 CARDIAC PACEMAKER IN SITU: ICD-10-CM

## 2019-09-23 DIAGNOSIS — I48.92 ATRIAL FLUTTER, UNSPECIFIED TYPE: ICD-10-CM

## 2019-09-23 DIAGNOSIS — Q24.9 ADULT CONGENITAL HEART DISEASE: ICD-10-CM

## 2019-09-23 PROCEDURE — 93296 REM INTERROG EVL PM/IDS: CPT | Mod: S$GLB,,, | Performed by: PEDIATRICS

## 2019-09-23 PROCEDURE — 93294 CV PACEMAKER REMOTE PEDIATRICS (CUPID ONLY): ICD-10-PCS | Mod: S$GLB,,, | Performed by: PEDIATRICS

## 2019-09-23 PROCEDURE — 93294 REM INTERROG EVL PM/LDLS PM: CPT | Mod: S$GLB,,, | Performed by: PEDIATRICS

## 2019-09-23 PROCEDURE — 93296 CV PACEMAKER REMOTE PEDIATRICS (CUPID ONLY): ICD-10-PCS | Mod: S$GLB,,, | Performed by: PEDIATRICS

## 2019-09-24 ENCOUNTER — TELEPHONE (OUTPATIENT)
Dept: PEDIATRIC CARDIOLOGY | Facility: CLINIC | Age: 44
End: 2019-09-24

## 2019-09-25 DIAGNOSIS — Q24.9 ADULT CONGENITAL HEART DISEASE: ICD-10-CM

## 2019-09-25 DIAGNOSIS — I48.92 ATRIAL FLUTTER, UNSPECIFIED TYPE: Primary | ICD-10-CM

## 2019-09-25 DIAGNOSIS — Z95.0 PACEMAKER: ICD-10-CM

## 2019-09-25 DIAGNOSIS — Q24.9 HEART FAILURE DUE TO CONGENITAL HEART DISEASE: ICD-10-CM

## 2019-09-25 DIAGNOSIS — I50.9 HEART FAILURE DUE TO CONGENITAL HEART DISEASE: ICD-10-CM

## 2019-09-25 LAB
AV DELAY - LONGEST: 300 MSEC
BATTERY VOLTAGE (V): 2.87 V
IMPEDANCE RA LEAD (NATIVE): 380 OHMS
IMPEDANCE RA LEAD: 310 OHMS
OHS CV DC PP MS1: 1.5 MS
OHS CV DC PP MS2: 0.5 MS
OHS CV DC PP V1: 2.5 V
OHS CV DC PP V2: 3 V
P/R-WAVE RA LEAD (NATIVE): 9 MV
P/R-WAVE RA LEAD: 0.5 MV
PV DELAY - LONGEST: 300 MSEC

## 2019-10-04 RX ORDER — ENALAPRIL MALEATE 10 MG/1
TABLET ORAL
Qty: 180 TABLET | Refills: 3 | Status: SHIPPED | OUTPATIENT
Start: 2019-10-04 | End: 2021-01-13 | Stop reason: SDUPTHER

## 2019-10-16 ENCOUNTER — PATIENT MESSAGE (OUTPATIENT)
Dept: CARDIOLOGY | Facility: CLINIC | Age: 44
End: 2019-10-16

## 2019-10-16 RX ORDER — SOTALOL HYDROCHLORIDE 80 MG/1
TABLET ORAL
Qty: 120 TABLET | Refills: 11 | Status: SHIPPED | OUTPATIENT
Start: 2019-10-16 | End: 2019-12-19

## 2019-10-22 NOTE — TELEPHONE ENCOUNTER
Spoke with Misael with [facility] to refill:  Sildenafil 20mg tab  180 tabs 3 refills ( 3 month supply )

## 2019-11-08 ENCOUNTER — PATIENT MESSAGE (OUTPATIENT)
Dept: PEDIATRIC CARDIOLOGY | Facility: CLINIC | Age: 44
End: 2019-11-08

## 2019-11-14 ENCOUNTER — PATIENT MESSAGE (OUTPATIENT)
Dept: PEDIATRIC CARDIOLOGY | Facility: CLINIC | Age: 44
End: 2019-11-14

## 2019-11-14 DIAGNOSIS — Q22.4 TRICUSPID ATRESIA WITH D-TRANSPOSITION OF GREAT ARTERIES: ICD-10-CM

## 2019-11-14 DIAGNOSIS — I27.29 PULMONARY ARTERIAL HYPERTENSION ASSOCIATED WITH CONGENITAL HEART DISEASE: Primary | ICD-10-CM

## 2019-11-14 DIAGNOSIS — I48.92 ATRIAL FLUTTER, UNSPECIFIED TYPE: ICD-10-CM

## 2019-11-14 DIAGNOSIS — Q24.9 PULMONARY ARTERIAL HYPERTENSION ASSOCIATED WITH CONGENITAL HEART DISEASE: Primary | ICD-10-CM

## 2019-11-14 DIAGNOSIS — Q20.3 TRICUSPID ATRESIA WITH D-TRANSPOSITION OF GREAT ARTERIES: ICD-10-CM

## 2019-12-19 ENCOUNTER — OFFICE VISIT (OUTPATIENT)
Dept: CARDIOLOGY | Facility: CLINIC | Age: 44
End: 2019-12-19
Payer: COMMERCIAL

## 2019-12-19 ENCOUNTER — HOSPITAL ENCOUNTER (OUTPATIENT)
Dept: CARDIOLOGY | Facility: CLINIC | Age: 44
Discharge: HOME | End: 2019-12-19
Attending: INTERNAL MEDICINE
Payer: COMMERCIAL

## 2019-12-19 ENCOUNTER — HOSPITAL ENCOUNTER (OUTPATIENT)
Dept: CARDIOLOGY | Facility: CLINIC | Age: 44
Discharge: HOME | End: 2019-12-19
Payer: COMMERCIAL

## 2019-12-19 ENCOUNTER — CLINICAL SUPPORT (OUTPATIENT)
Dept: CARDIOLOGY | Facility: CLINIC | Age: 44
End: 2019-12-19
Attending: PEDIATRICS
Payer: COMMERCIAL

## 2019-12-19 VITALS
HEART RATE: 75 BPM | HEIGHT: 63 IN | DIASTOLIC BLOOD PRESSURE: 60 MMHG | OXYGEN SATURATION: 91 % | HEIGHT: 63 IN | OXYGEN SATURATION: 91 % | SYSTOLIC BLOOD PRESSURE: 124 MMHG | BODY MASS INDEX: 29.1 KG/M2 | BODY MASS INDEX: 29.1 KG/M2 | SYSTOLIC BLOOD PRESSURE: 124 MMHG | HEART RATE: 75 BPM | WEIGHT: 164.25 LBS | WEIGHT: 164.25 LBS | DIASTOLIC BLOOD PRESSURE: 60 MMHG

## 2019-12-19 VITALS — BODY MASS INDEX: 29.41 KG/M2 | WEIGHT: 166 LBS | HEIGHT: 63 IN

## 2019-12-19 DIAGNOSIS — Q20.3 TRICUSPID ATRESIA WITH D-TRANSPOSITION OF GREAT ARTERIES: Primary | ICD-10-CM

## 2019-12-19 DIAGNOSIS — I48.92 ATRIAL FLUTTER, UNSPECIFIED TYPE: ICD-10-CM

## 2019-12-19 DIAGNOSIS — Q20.3 TRICUSPID ATRESIA WITH D-TRANSPOSITION OF GREAT ARTERIES: ICD-10-CM

## 2019-12-19 DIAGNOSIS — Q24.9 ADULT CONGENITAL HEART DISEASE: ICD-10-CM

## 2019-12-19 DIAGNOSIS — I27.29 PULMONARY ARTERIAL HYPERTENSION ASSOCIATED WITH CONGENITAL HEART DISEASE: ICD-10-CM

## 2019-12-19 DIAGNOSIS — Z98.890 S/P FONTAN PROCEDURE: Primary | ICD-10-CM

## 2019-12-19 DIAGNOSIS — Q22.4 TRICUSPID ATRESIA WITH D-TRANSPOSITION OF GREAT ARTERIES: Primary | ICD-10-CM

## 2019-12-19 DIAGNOSIS — Z79.01 LONG TERM CURRENT USE OF ANTICOAGULANT THERAPY: ICD-10-CM

## 2019-12-19 DIAGNOSIS — R23.0 CYANOSIS: ICD-10-CM

## 2019-12-19 DIAGNOSIS — Z95.0 CARDIAC PACEMAKER IN SITU: ICD-10-CM

## 2019-12-19 DIAGNOSIS — Z95.0 PACEMAKER: ICD-10-CM

## 2019-12-19 DIAGNOSIS — Q22.4 TRICUSPID ATRESIA WITH D-TRANSPOSITION OF GREAT ARTERIES: ICD-10-CM

## 2019-12-19 DIAGNOSIS — Q21.0 VSD (VENTRICULAR SEPTAL DEFECT AND AORTIC ARCH HYPOPLASIA: ICD-10-CM

## 2019-12-19 DIAGNOSIS — Q25.42 VSD (VENTRICULAR SEPTAL DEFECT AND AORTIC ARCH HYPOPLASIA: ICD-10-CM

## 2019-12-19 DIAGNOSIS — I49.9 VENTRICULAR ARRHYTHMIA: ICD-10-CM

## 2019-12-19 DIAGNOSIS — G47.33 OSA (OBSTRUCTIVE SLEEP APNEA): ICD-10-CM

## 2019-12-19 DIAGNOSIS — I87.2 CHRONIC VENOUS INSUFFICIENCY: ICD-10-CM

## 2019-12-19 DIAGNOSIS — Q24.9 HEART FAILURE DUE TO CONGENITAL HEART DISEASE: ICD-10-CM

## 2019-12-19 DIAGNOSIS — Q24.9 PULMONARY ARTERIAL HYPERTENSION ASSOCIATED WITH CONGENITAL HEART DISEASE: ICD-10-CM

## 2019-12-19 DIAGNOSIS — Z98.890 S/P FONTAN PROCEDURE: ICD-10-CM

## 2019-12-19 DIAGNOSIS — I50.9 HEART FAILURE DUE TO CONGENITAL HEART DISEASE: ICD-10-CM

## 2019-12-19 LAB
AV DELAY - LONGEST: 300 MSEC
BATTERY VOLTAGE (V): 2.84 V
BSA FOR ECHO PROCEDURE: 1.83 M2
IMPEDANCE RA LEAD (NATIVE): 350 OHMS
IMPEDANCE RA LEAD: 330 OHMS
OHS CV DC PP MS1: 1.5 MS
OHS CV DC PP MS2: 0.5 MS
OHS CV DC PP V1: 2.5 V
OHS CV DC PP V2: 3 V
P/R-WAVE RA LEAD (NATIVE): NORMAL MV
PV DELAY - LONGEST: 300 MSEC
THRESHOLD MS RA LEAD (NATIVE): 0.5 MS
THRESHOLD MS RA LEAD: 1.5 MS
THRESHOLD V RA LEAD (NATIVE): 1.25 V
THRESHOLD V RA LEAD: 0.5 V

## 2019-12-19 PROCEDURE — 93303 ECHO (CUPID ONLY): ICD-10-PCS | Mod: 26,,, | Performed by: PEDIATRICS

## 2019-12-19 PROCEDURE — 93325 ECHO (CUPID ONLY): ICD-10-PCS | Mod: 26,,, | Performed by: PEDIATRICS

## 2019-12-19 PROCEDURE — 3008F PR BODY MASS INDEX (BMI) DOCUMENTED: ICD-10-PCS | Mod: CPTII,S$GLB,, | Performed by: PEDIATRICS

## 2019-12-19 PROCEDURE — 93005 ELECTROCARDIOGRAM TRACING: CPT | Mod: S$GLB,,, | Performed by: PEDIATRICS

## 2019-12-19 PROCEDURE — 93010 EKG 12-LEAD: ICD-10-PCS | Mod: S$GLB,,, | Performed by: INTERNAL MEDICINE

## 2019-12-19 PROCEDURE — 99215 PR OFFICE/OUTPT VISIT, EST, LEVL V, 40-54 MIN: ICD-10-PCS | Mod: 25,S$GLB,, | Performed by: PEDIATRICS

## 2019-12-19 PROCEDURE — 93010 ELECTROCARDIOGRAM REPORT: CPT | Mod: S$GLB,,, | Performed by: INTERNAL MEDICINE

## 2019-12-19 PROCEDURE — 93325 DOPPLER ECHO COLOR FLOW MAPG: CPT | Mod: 26,,, | Performed by: PEDIATRICS

## 2019-12-19 PROCEDURE — 93005 EKG 12-LEAD: ICD-10-PCS | Mod: S$GLB,,, | Performed by: PEDIATRICS

## 2019-12-19 PROCEDURE — 93320 DOPPLER ECHO COMPLETE: CPT

## 2019-12-19 PROCEDURE — 93303 ECHO TRANSTHORACIC: CPT | Mod: 26,,, | Performed by: PEDIATRICS

## 2019-12-19 PROCEDURE — 99999 PR PBB SHADOW E&M-EST. PATIENT-LVL III: CPT | Mod: PBBFAC,,, | Performed by: PEDIATRICS

## 2019-12-19 PROCEDURE — 99215 OFFICE O/P EST HI 40 MIN: CPT | Mod: 25,S$GLB,, | Performed by: PEDIATRICS

## 2019-12-19 PROCEDURE — 3008F BODY MASS INDEX DOCD: CPT | Mod: CPTII,S$GLB,, | Performed by: PEDIATRICS

## 2019-12-19 PROCEDURE — 99214 PR OFFICE/OUTPT VISIT, EST, LEVL IV, 30-39 MIN: ICD-10-PCS | Mod: 25,S$GLB,, | Performed by: PEDIATRICS

## 2019-12-19 PROCEDURE — 93320 ECHO (CUPID ONLY): ICD-10-PCS | Mod: 26,,, | Performed by: PEDIATRICS

## 2019-12-19 PROCEDURE — 99999 PR PBB SHADOW E&M-EST. PATIENT-LVL III: ICD-10-PCS | Mod: PBBFAC,,, | Performed by: PEDIATRICS

## 2019-12-19 PROCEDURE — 99999 PR PBB SHADOW E&M-EST. PATIENT-LVL I: CPT | Mod: PBBFAC,,,

## 2019-12-19 PROCEDURE — 99214 OFFICE O/P EST MOD 30 MIN: CPT | Mod: 25,S$GLB,, | Performed by: PEDIATRICS

## 2019-12-19 PROCEDURE — 93280 PM DEVICE PROGR EVAL DUAL: CPT | Mod: S$GLB,,, | Performed by: PEDIATRICS

## 2019-12-19 PROCEDURE — 93320 DOPPLER ECHO COMPLETE: CPT | Mod: 26,,, | Performed by: PEDIATRICS

## 2019-12-19 PROCEDURE — 99999 PR PBB SHADOW E&M-EST. PATIENT-LVL I: ICD-10-PCS | Mod: PBBFAC,,,

## 2019-12-19 PROCEDURE — 93280 CV PACEMAKER PROGRAMMING PEDIATRICS (CUPID ONLY): ICD-10-PCS | Mod: S$GLB,,, | Performed by: PEDIATRICS

## 2019-12-19 NOTE — PROGRESS NOTES
"12/19/2019    Patient Name: LEVON GANDHI  YOB: 1975  [facility] Number: [id number]    Kevin Chaves MD  [address]  [place] [address]    MIRIAM Garcia MD  [address]   [address]  [place] [address]    Dear Dr. Chaves and Jose:    It is a pleasure to see Levon at our main campus adult congenital cardiology clinic to evaluate tricuspid atresia.    Levon is a 44 y.o. Male with a complex past medical history. To summarize, his diagnoses are as follows:  1. 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   - History of a classic Roger shunt and a 12 mm conduit from the right atrium to the left pulmonary artery along with a Damus Judy Stansel operation    - now with mild native and brigette-aortic insufficiency   - lateral tunnel Fontan with a 16 mm ring Goretex graft from the left pulmonary artery to the right pulmonary artery.   - elevated central venous pressures with mild desaturation at least partially due to collateral vessels - improved after cath 2010 but still an issue.   - mild mitral regurgitation    - Last liver US March 2019  2. 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     - arrhythmias typically manifest as "back pain"   - nonsustained VT, Noninvasive EP study (NIEPS) negative 2016  3. Varicose veins followed in the past by vascular Medicine.  Continued issues with leg pain and swelling, especially the right leg.  Of note, the patient was accessed via the right femoral vein without difficulty at the 2010 catheterization.  4. No evidence of protein-losing enteropathy.    5. obstructive sleep apnea    Discussion:  Overall, I am very pleased with how he looks.  His pacemaker evaluation reveals no significant arrhythmias.  His echocardiogram reveals good ventricular function with mild mitral insufficiency.  " Chronic venous insufficiency is incredibly common in patients status post Fontan operation.  His central venous pressure has been elevated for years.  That said, I can't explain why the right leg is worse than the left.  I discussed his case with Dr. Steel who recommended venous insufficiency ultrasounds bilaterally.    My plan is as follows:  1.  continue to work with sleep medicine to get MIGUEL treated appropriately  2.  Continue current medications  3.  Follow up in 6 months with echo, EKG, EP visit, baseline labs, and liver ultrasound    4.  Bilateral lower extremity venous ultrasound looking for chronic venous insufficiency.  Patient will get this done in 6 months as well (his request) although we can get earlier if needed.  5.  Strongly consider a repeat cardiac catheterization in the next year or 2 as it has been 9 years since his last study.  If any other symptoms worsen, we will get the cath sooner.    Interval history:  I last saw him about 9 months ago.  Overall, he has done well since that time.  He denies any baseline chest pain, shortness of breath, worsening cyanosis, syncope, or near-syncope.  He does have a lot of issues with swelling in the right leg.  This occurs when he has been on his feet for a long time.  It gets better with elevation and Lasix.  He gets occasional cramps in that right leg that will wake him up at night.  He does feel like this right leg edema and cramping is worse than it was 6-12 months ago.  He finds that he gets a little more out of breath with exertion compared to a year ago as well.  He denies diarrhea.  He denies any other swelling.  He denies palpitations, but he did have a weird episode week ago.  He was laying in bed when he heard his heart making a swishing noise like a machine with an open valve.  This lasted for about a minute.  He otherwise felt completely fine.  However, he admits that he has never felt palpitations.  His arrhythmias always presented as upper  back pain.    He was seen by EP today.  On evaluation of his pacemaker, no significant arrhythmias were noted.    On July 7, 2010 he underwent a cardiac catheterization:   1. Complex univentricular cardiac defect with superior inferior ventricles with double inlet left ventricle (atrioventricular connection not defined at catheterization), status post Gqrdc-Zdiz-Bfrdmjl connection and intracardiac lateral tunnel Fontan.   2. Moderate neoaortic (pulmonary) valve insufficiency.   3. Left aorta.   4. 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   5. Elevated central venous pressure (mean 19), transpulmonary gradient 4-5 mmHg.    Past Medical History:   Diagnosis Date    Asthma     Atrial flutter     Cyanosis     Heart murmur     Intra-atrial reentrant tachycardia     MIGUEL (obstructive sleep apnea) 10/23/2014    TGA (transposition of great arteries)     Tricuspid atresia     VSD (ventricular septal defect)      Past Surgical History:   Procedure Laterality Date    CARDIAC CATHETERIZATION      FONTAN PROCEDURE, EXTRACARDIAC       Family History   Problem Relation Age of Onset    No Known Problems Mother     No Known Problems Father     No Known Problems Sister     No Known Problems Brother     No Known Problems Maternal Grandmother     Heart disease Maternal Grandfather     No Known Problems Paternal Grandmother     Diabetes Paternal Grandfather     No Known Problems Maternal Aunt     No Known Problems Maternal Uncle     Diabetes Paternal Aunt     No Known Problems Paternal Uncle     Atrial Septal Defect Neg Hx     Anemia Neg Hx     Arrhythmia Neg Hx     Asthma Neg Hx     Clotting disorder Neg Hx     Fainting Neg Hx     Heart attack Neg Hx     Heart failure Neg Hx     Hyperlipidemia Neg Hx     Hypertension Neg Hx     Stroke Neg Hx      Social History     Socioeconomic History    Marital status:      Spouse name: Not on file    Number of children: Not on file     Years of education: Not on file    Highest education level: Not on file   Occupational History    Not on file   Social Needs    Financial resource strain: Not on file    Food insecurity:     Worry: Not on file     Inability: Not on file    Transportation needs:     Medical: Not on file     Non-medical: Not on file   Tobacco Use    Smoking status: Never Smoker    Smokeless tobacco: Never Used   Substance and Sexual Activity    Alcohol use: No    Drug use: No    Sexual activity: Not on file   Lifestyle    Physical activity:     Days per week: Not on file     Minutes per session: Not on file    Stress: Not on file   Relationships    Social connections:     Talks on phone: Not on file     Gets together: Not on file     Attends Mu-ism service: Not on file     Active member of club or organization: Not on file     Attends meetings of clubs or organizations: Not on file     Relationship status: Not on file   Other Topics Concern    Not on file   Social History Narrative    He is working at [employer].     Current Outpatient Medications on File Prior to Visit   Medication Sig Dispense Refill    digoxin (LANOXIN) 250 mcg tablet Take 1 tablet (0.25 mg total) by mouth every morning. 90 tablet 3    enalapril (VASOTEC) 10 MG tablet TAKE 1 TABLET BY MOUTH TWICE A  tablet 3    furosemide (LASIX) 20 MG tablet TAKE 1 TABLET (20 MG TOTAL) BY MOUTH ONCE DAILY. 60 tablet 6    metoprolol succinate (TOPROL-XL) 100 MG 24 hr tablet TAKE 3 TABLETS (300 MG TOTAL) BY MOUTH ONCE DAILY. 270 tablet 3    sildenafil (REVATIO) 20 mg Tab TAKE 1 TABLET BY MOUTH TWICE A  tablet 3    sotalol (BETAPACE) 240 MG Tab Take 1 tablet (240 mg total) by mouth 2 (two) times daily. 60 tablet 11    warfarin (COUMADIN) 2 MG tablet Take 10 mg (6mg + 2 (2mg) tabs every Saturday and 8mg (6mg +2 mg) all other days as directed 35 tablet 11    warfarin (COUMADIN) 6 MG tablet TAKE 10MG (6MG + 2 (2MG) TABS EVERY SATURDAY AND  "8MG (6MG + 2MG) TABS ALL OTHER DAYS AS DIRECTED 30 tablet 11    sotalol (SOTALOL AF) 80 MG tablet TAKE 2 TABLETS (160 MG TOTAL) BY MOUTH 2 (TWO) TIMES DAILY. 120 tablet 11     No current facility-administered medications on file prior to visit.      Review of patient's allergies indicates:  No Known Allergies     /60 (BP Location: Left arm, Patient Position: Sitting, BP Method: Large (Automatic))   Pulse 75   Ht 5' 3" (1.6 m)   Wt 74.5 kg (164 lb 3.9 oz)   SpO2 (!) 91%   BMI 29.09 kg/m²   Weight: 74.5 kg (164 lb 3.9 oz)     In general, his baseline ruddiness is stable.  He is a white male in no apparent distress. Head is normocephalic and atraumatic. The eyes, nares, and oropharynx are clear. The neck is supple without jugular venous distention or lymphadenopathy, and his face does not look swollen. Lungs are clear to auscultation bilaterally. The chest is symmetrical. Multiple well-healed surgical scars are noted. The pacemaker is palpated in the right upper chest. The area is nontender without evidence of infection. The precordium is quiet. First heart sound is normal. A loud single second heart sound is auscultated. A grade 1/6 systolic ejection murmur is auscultated. No diastolic murmurs or gallops are heard. The abdominal exam reveals a liver edge palpated about 2 cm below the right costal margin. It is not pulsatile. He abdomen is soft and nontender without evidence of ascites. I could not palpate his spleen. There is no significant clubbing and no obvious cyanosis. He has good pulses in his legs bilaterally. There is trivial edema in the mild pitting edema in the right leg up to about the mid shin. He does have varicose veins.  No palpable cords or calf tenderness.    I personally reviewed the following studies:    His echo reveals:  Images consistent with tricuspid atresia, d-TGA, VSD and RV hypoplasia S/P DKS and lateral tunnel Fontan -  Dilated inferior vena cava and hepatic veins joining very " dilated lateral tunnel Fontan and slow moving spontaneous contrast, laminar flow and no obvious thrombus in IVC, hepatic veins or proximal lateral tunnel.  Unable to demonstrate remaining segments of Fontan circuit and pulmonary arteries with very limited suprasternal windows.  Small functional right atrium.  Unrestricted bidirectional flow at atrial septum.   At least moderate left atrial enlargement.   Mild to moderate mitral valve insufficiency.   Dilated left ventricle - severe.   Qualitatively reduced left (single) ventricle systolic function with SF = 21% and EF estimated 45 - 50% from apical views.   Unrestricted flow at VSD to anterior aorta and small subaortic chamber (Right ventricle).   Anterior native aortic valve with no stenosis and mild insufficiency.   Posteriorly positioned trileaflet pulmonary valve with no left ventricular outflow obstruction, mild insufficiency and no valvar stenosis.   Good imaging of the DKS anastomosis with no evidence of obstruction in either limb.    No pericardial effusion.    His EKG reveals AV sequential pacing with a long VA interval and good capture.      Results for HIRAM, LEVON AVILA (MRN [id number]) as of 3/14/2019 21:14   Ref. Range 3/14/2019 11:38   AFP Latest Ref Range: 0.0 - 8.4 ng/mL 2.0     Lab Results   Component Value Date    INR 3.2 (H) 03/14/2019    INR 2.3 (H) 07/26/2018    INR 2.3 (H) 03/23/2018     Lab Results   Component Value Date    WBC 4.24 03/14/2019    HGB 16.8 03/14/2019    HCT 50.1 03/14/2019    MCV 92 03/14/2019    PLT 94 (L) 03/14/2019     CMP  Sodium   Date Value Ref Range Status   03/14/2019 139 136 - 145 mmol/L Final     Potassium   Date Value Ref Range Status   03/14/2019 4.5 3.5 - 5.1 mmol/L Final     Chloride   Date Value Ref Range Status   03/14/2019 102 95 - 110 mmol/L Final     CO2   Date Value Ref Range Status   03/14/2019 30 (H) 23 - 29 mmol/L Final     Glucose   Date Value Ref Range Status   03/14/2019 85 70 - 110 mg/dL Final      BUN, Bld   Date Value Ref Range Status   03/14/2019 15 6 - 20 mg/dL Final     Creatinine   Date Value Ref Range Status   03/14/2019 0.9 0.5 - 1.4 mg/dL Final     Calcium   Date Value Ref Range Status   03/14/2019 9.8 8.7 - 10.5 mg/dL Final     Total Protein   Date Value Ref Range Status   03/14/2019 7.2 6.0 - 8.4 g/dL Final     Albumin   Date Value Ref Range Status   03/14/2019 4.2 3.5 - 5.2 g/dL Final     Total Bilirubin   Date Value Ref Range Status   03/14/2019 1.1 (H) 0.1 - 1.0 mg/dL Final     Comment:     For infants and newborns, interpretation of results should be based  on gestational age, weight and in agreement with clinical  observations.  Premature Infant recommended reference ranges:  Up to 24 hours.............<8.0 mg/dL  Up to 48 hours............<12.0 mg/dL  3-5 days..................<15.0 mg/dL  6-29 days.................<15.0 mg/dL       Alkaline Phosphatase   Date Value Ref Range Status   03/14/2019 79 55 - 135 U/L Final     AST   Date Value Ref Range Status   03/14/2019 30 10 - 40 U/L Final     ALT   Date Value Ref Range Status   03/14/2019 27 10 - 44 U/L Final     Anion Gap   Date Value Ref Range Status   03/14/2019 7 (L) 8 - 16 mmol/L Final     eGFR if    Date Value Ref Range Status   03/14/2019 >60.0 >60 mL/min/1.73 m^2 Final     eGFR if non    Date Value Ref Range Status   03/14/2019 >60.0 >60 mL/min/1.73 m^2 Final     Comment:     Calculation used to obtain the estimated glomerular filtration  rate (eGFR) is the CKD-EPI equation.        Results for LEVON GANDHI (MRN [id number]) as of 12/19/2019 13:59   Ref. Range 3/14/2019 11:38   AFP Latest Ref Range: 0.0 - 8.4 ng/mL 2.0     Liver US March 2019:  Hepatomegaly  Splenomegaly  Distension of the hepatic veins and  the inferior vena cava mildly suggesting early right-sided heart failure  Appropriate hepatic vascular flow    Thank you for referring this patient to our clinic. Please call with any  questions.    Sincerely,        Dereje Acosta MD  Pediatric Cardiology  Adult Congenital Heart Disease  Pediatric Heart Failure and Transplantation  [facility] [address] [place]  [address]  [phone]

## 2019-12-19 NOTE — LETTER
December 19, 2019        Kevin Chaves MD  [address]  [address]  [place] [address]             [facility]  [address]  [place] [address]  Phone: [phone]   Patient: Marcello Briggs   MR Number: [id number]   YOB: 1975   Date of Visit: 12/19/2019       Dear Dr. Chaves:    Thank you for referring Marcello Briggs to me for evaluation. Attached you will find relevant portions of my assessment and plan of care.    If you have questions, please do not hesitate to call me. I look forward to following Marcello Briggs along with you.    Sincerely,      Dereje Acosta MD            CC  No Recipients    Enclosure

## 2019-12-19 NOTE — PROGRESS NOTES
Thank you for referring your patient Marcello Briggs to the adult congenital electrophysiology clinic for consultation. Please review my findings below.    CHIEF COMPLAINT: F/u for arrhythmias.    HISTORY OF PRESENT ILLNESS:  Marcello is a 44 y.o. Male with a complex past medical history. Followed by Dr. Acosta in the [facility] clinic.     To summarize, his diagnoses are as follows:  1. 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  2. History of a classic Roger shunt and a 12 mm conduit from the right atrium to the left pulmonary artery along with a Damus Judy Stansel operation - now with trivial native and mild to moderate brigette-aortic insufficiency   3. Subsequent lateral tunnel Fontan operation utilizing a 16 mm ring Goretex graft from the left pulmonary artery to the right pulmonary artery.  4. 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 Last cardioversion about 1 year ago.  5. Significantly elevated central venous pressures with chronic desaturation at least partially due to a collateral vessels - improved after catheterization.  6. Varicose veins followed by vascular medicine - improved.  7. No evidence of protein-losing enteropathy.  8. Mild AV valve insuffiency.    On July 7, 2010 he underwent a cardiac catheterization:   1. Complex univentricular cardiac defect with superior inferior ventricles with double inlet left ventricle (atrioventricular connection not defined at catheterization), status post Yrpbz-Caid-Isdcdil connection and intracardiac lateral tunnel Fontan.   2. Moderate neoaortic (pulmonary) valve insufficiency.   3. Left aorta.   4. 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   5. Elevated central venous pressure (mean 19), transpulmonary gradient 4-5 mmHg.    Marcello was seen in clinic on 1/21/16.  He was found to be at BLANK at that  time.  He underwent pacemaker generator change on 1/22/16.   He was subsequently noted to have ventricular high rate episodes.  He underwent NIEPS on 4/6/16, he had no inducible VT.  Based on the tracings there was concern for intermittent atrial fib/flutter.     He was last seen in EP clinic in March 2019.  He returns today for scheduled follow up.  He is overall doing well.  He has been having some swelling and cramping in his right leg where he has varicose veins.  He was on his feet working a lot of hours at that time.  He has no chest pain or difficulty breathing.  He has no syncope or near syncope.  He has good activity.  He has no fatigue by report.      REVIEW OF SYSTEMS:   GENERAL: No fever, chills,or weight loss.    SKIN: No rashes, itching or changes in color or texture of skin.  CHEST: see HPI  CARDIOVASCULAR: Denies chest pain or reduced exercise tolerance.  ABDOMEN: Appetite fine. No weight loss. Denies diarrhea, abdominal pain, or vomiting.  NEUROLOGIC: No history of seizures,  alteration of gait or coordination.    PAST MEDICAL HISTORY:   Past Medical History:   Diagnosis Date    Asthma     Atrial flutter     Cyanosis     Heart murmur     Intra-atrial reentrant tachycardia     MIGUEL (obstructive sleep apnea) 10/23/2014    TGA (transposition of great arteries)     Tricuspid atresia     VSD (ventricular septal defect)        FAMILY HISTORY:   The family history is negative for congenital heart disease and sudden death in individuals less than 40 years old.    SOCIAL HISTORY:   Social History     Socioeconomic History    Marital status:      Spouse name: Not on file    Number of children: Not on file    Years of education: Not on file    Highest education level: Not on file   Occupational History    Not on file   Social Needs    Financial resource strain: Not on file    Food insecurity:     Worry: Not on file     Inability: Not on file    Transportation needs:     Medical: Not on  file     Non-medical: Not on file   Tobacco Use    Smoking status: Never Smoker    Smokeless tobacco: Never Used   Substance and Sexual Activity    Alcohol use: No    Drug use: No    Sexual activity: Not on file   Lifestyle    Physical activity:     Days per week: Not on file     Minutes per session: Not on file    Stress: Not on file   Relationships    Social connections:     Talks on phone: Not on file     Gets together: Not on file     Attends Latter-day service: Not on file     Active member of club or organization: Not on file     Attends meetings of clubs or organizations: Not on file     Relationship status: Not on file   Other Topics Concern    Not on file   Social History Narrative    He is working at [employer].       ALLERGIES:  No Known Allergies    MEDICATIONS:    Current Outpatient Medications:     digoxin (LANOXIN) 250 mcg tablet, Take 1 tablet (0.25 mg total) by mouth every morning., Disp: 90 tablet, Rfl: 3    enalapril (VASOTEC) 10 MG tablet, TAKE 1 TABLET BY MOUTH TWICE A DAY, Disp: 180 tablet, Rfl: 3    furosemide (LASIX) 20 MG tablet, TAKE 1 TABLET (20 MG TOTAL) BY MOUTH ONCE DAILY., Disp: 60 tablet, Rfl: 6    metoprolol succinate (TOPROL-XL) 100 MG 24 hr tablet, TAKE 3 TABLETS (300 MG TOTAL) BY MOUTH ONCE DAILY., Disp: 270 tablet, Rfl: 3    sildenafil (REVATIO) 20 mg Tab, TAKE 1 TABLET BY MOUTH TWICE A DAY, Disp: 180 tablet, Rfl: 3    sotalol (BETAPACE) 240 MG Tab, Take 1 tablet (240 mg total) by mouth 2 (two) times daily., Disp: 60 tablet, Rfl: 11    warfarin (COUMADIN) 2 MG tablet, Take 10 mg (6mg + 2 (2mg) tabs every Saturday and 8mg (6mg +2 mg) all other days as directed, Disp: 35 tablet, Rfl: 11    warfarin (COUMADIN) 6 MG tablet, TAKE 10MG (6MG + 2 (2MG) TABS EVERY SATURDAY AND 8MG (6MG + 2MG) TABS ALL OTHER DAYS AS DIRECTED, Disp: 30 tablet, Rfl: 11      PHYSICAL EXAM:   Vitals:    12/19/19 1342   BP: 124/60   BP Location: Left arm   Patient Position: Sitting   BP  "Method: Large (Automatic)   Pulse: 75   SpO2: (!) 91%   Weight: 74.5 kg (164 lb 3.9 oz)   Height: 5' 3" (1.6 m)       GENERAL: Awake, well-developed well-nourished, no apparent distress  HEENT: mucous membranes moist and pink, normocephalic atraumatic, no cranial or carotid bruits, sclera anicteric  NECK: no jugular venous distention, no lymphadenopathy  CHEST: Good air movement, clear to auscultation bilaterally.  Pacemaker incision healing well.    CARDIOVASCULAR: Quiet precordium, regular rate and rhythm, no rubs or gallops.  A grade 1/6 systolic ejection murmur is auscultated. No diastolic murmurs or gallops are heard.  ABDOMEN: Soft, nontender nondistended, Liver edge palpated 1 cm below costal margin.  EXTREMITIES: Warm well perfused, 2+ radial/pedal pulses, capillary refill 2 seconds, no clubbing, cyanosis, or edema  NEURO: Alert and oriented, cooperative with exam, face symmetric, moves all extremities well      STUDIES:  ECG: AV sequential pacing with prolonged AV delay.    Pacemaker Interrogation:   Following physician: Dereje    Permanent Programming   RA Lead: 2.5 V @ 1.5 ms. Sensitivity: 0.5 mV.   RV Lead: 3.0 V @ 0.5 ms. Sensitivity: 2.0 mV.     Pacemaker Generator and Leads meet standard of FDA approval.     Chamber type: dual.   Mode: DDDR   Lower limit rate: 75 bpm   Upper tracking rate: 130 bpm     AV Delay  Longest: 300 msec       PV Delay  Longest: 300 msec   Device Analysis 2     Battery voltage: 2.84 V  Estimated longevity: 1.4 yrs.       Mode Switch: Yes  Mode Switch comments: x3- noise artifact noted  Nonsustained VT: No    Other: 1HVR- appears idioventricular beats    Leads  RA Lead:   P/R-wave: Paced mV  Lead Impedance: 330 Ohms  Paced: 98%   RV Lead:   P/R-wave: 9.5-9.9 mV  Impedance: 350 Ohms  Paced: 98%       Thresholds  RA Lead: 0.5 V @ 1.5 ms. Configuration: unipolar.   RV Lead: 1.25 V @ 0.5 ms. Configuration: bipolar.   Device Comments     Wound check comments:   Chronic left upper " chest incision    Reprogramming comments:   PAV/LORRAINE 300ms---> 200ms    General comments:   Pacemaker interrogation and lead testing performed. Device and leads stable. 1.4 yrs device longevity. Atrial arrhythmias noted as noise artifact. HVR - idioventricular beats as previously seen in past.    Next REMOTE device check scheduled for Monday, March 23, 2020.         ASSESSMENT:  44 y.o. male with TGA and tricuspid atresia s/p Fontan with pacemaker.  He is asymptomatic at present.       PLAN:   - Metoprolol 300 mg XL daily  - Sotalol to 240 mg po BID  - Continue Dig to 250mcg daily  - Continue anticoagulation  - Patient asked to call with palpitations, syncope, worsening fatigue, chest pain, or any other questions or concerns in the interim.  - Schedule follow up in 6 months with ECG, and pacemaker check.  - Recommend CPAP usage.  - SBE prophylaxis as per Dr. Acosta        The patient's doctor will be notified via EPIC/FAX    I hope this brings you up-to-date on Marcello Briggs  Please contact me with any questions or concerns.    Jaz Reyez M.D.  Pediatric Cardiology   Pediatric Electrophysiology

## 2020-03-20 ENCOUNTER — PATIENT MESSAGE (OUTPATIENT)
Dept: PEDIATRIC CARDIOLOGY | Facility: CLINIC | Age: 45
End: 2020-03-20

## 2020-03-25 ENCOUNTER — TELEPHONE (OUTPATIENT)
Dept: PEDIATRIC CARDIOLOGY | Facility: CLINIC | Age: 45
End: 2020-03-25

## 2020-03-25 NOTE — TELEPHONE ENCOUNTER
Left message requesting Marcello Briggs perform REMOTE device check. Call back number left in message if any questions or issues.

## 2020-03-26 ENCOUNTER — TELEPHONE (OUTPATIENT)
Dept: PEDIATRIC CARDIOLOGY | Facility: CLINIC | Age: 45
End: 2020-03-26

## 2020-03-26 NOTE — TELEPHONE ENCOUNTER
Spoke to patient. Monitor was unplugged. He will re plug in monitor to do transmission. Phone number for [employer] [facility] [phone] given to patient for troubleshooting issues.

## 2020-03-30 ENCOUNTER — CLINICAL SUPPORT (OUTPATIENT)
Dept: PEDIATRIC CARDIOLOGY | Facility: CLINIC | Age: 45
End: 2020-03-30
Attending: PEDIATRICS
Payer: COMMERCIAL

## 2020-03-30 DIAGNOSIS — Q24.9 HEART FAILURE DUE TO CONGENITAL HEART DISEASE: ICD-10-CM

## 2020-03-30 DIAGNOSIS — Z95.0 PACEMAKER: ICD-10-CM

## 2020-03-30 DIAGNOSIS — I50.9 HEART FAILURE DUE TO CONGENITAL HEART DISEASE: ICD-10-CM

## 2020-03-30 DIAGNOSIS — Q24.9 ADULT CONGENITAL HEART DISEASE: ICD-10-CM

## 2020-03-30 DIAGNOSIS — I48.92 ATRIAL FLUTTER, UNSPECIFIED TYPE: ICD-10-CM

## 2020-03-30 LAB
AV DELAY - LONGEST: 200 MSEC
BATTERY VOLTAGE (V): 2.81 V
IMPEDANCE RA LEAD (NATIVE): 350 OHMS
IMPEDANCE RA LEAD: 310 OHMS
OHS CV DC PP MS1: 1.5 MS
OHS CV DC PP MS2: 0.5 MS
OHS CV DC PP V1: 2.5 V
OHS CV DC PP V2: 3 V
PV DELAY - LONGEST: 200 MSEC

## 2020-03-30 PROCEDURE — 93294 REM INTERROG EVL PM/LDLS PM: CPT | Mod: S$GLB,,, | Performed by: PEDIATRICS

## 2020-03-30 PROCEDURE — 93296 REM INTERROG EVL PM/IDS: CPT | Mod: S$GLB,,, | Performed by: PEDIATRICS

## 2020-03-30 PROCEDURE — 93296 CV PACEMAKER REMOTE PEDIATRICS (CUPID ONLY): ICD-10-PCS | Mod: S$GLB,,, | Performed by: PEDIATRICS

## 2020-03-30 PROCEDURE — 93294 CV PACEMAKER REMOTE PEDIATRICS (CUPID ONLY): ICD-10-PCS | Mod: S$GLB,,, | Performed by: PEDIATRICS

## 2020-04-01 RX ORDER — WARFARIN 6 MG/1
TABLET ORAL
Qty: 30 TABLET | Refills: 11 | Status: SHIPPED | OUTPATIENT
Start: 2020-04-01 | End: 2020-07-25 | Stop reason: SDUPTHER

## 2020-04-01 RX ORDER — WARFARIN 2 MG/1
TABLET ORAL
Qty: 35 TABLET | Refills: 11 | Status: SHIPPED | OUTPATIENT
Start: 2020-04-01 | End: 2020-07-25 | Stop reason: SDUPTHER

## 2020-05-12 ENCOUNTER — PATIENT MESSAGE (OUTPATIENT)
Dept: CARDIOLOGY | Facility: CLINIC | Age: 45
End: 2020-05-12

## 2020-05-12 DIAGNOSIS — I48.92 ATRIAL FLUTTER, UNSPECIFIED TYPE: Primary | ICD-10-CM

## 2020-05-12 DIAGNOSIS — I49.9 VENTRICULAR ARRHYTHMIA: ICD-10-CM

## 2020-05-12 RX ORDER — SOTALOL HYDROCHLORIDE 240 MG/1
240 TABLET ORAL 2 TIMES DAILY
Qty: 60 TABLET | Refills: 11 | Status: SHIPPED | OUTPATIENT
Start: 2020-05-12 | End: 2021-08-11

## 2020-07-27 DIAGNOSIS — Q24.9 PULMONARY ARTERIAL HYPERTENSION ASSOCIATED WITH CONGENITAL HEART DISEASE: Primary | ICD-10-CM

## 2020-07-27 DIAGNOSIS — Q21.0 VSD (VENTRICULAR SEPTAL DEFECT AND AORTIC ARCH HYPOPLASIA: ICD-10-CM

## 2020-07-27 DIAGNOSIS — I27.29 PULMONARY ARTERIAL HYPERTENSION ASSOCIATED WITH CONGENITAL HEART DISEASE: Primary | ICD-10-CM

## 2020-07-27 DIAGNOSIS — Q22.4 TRICUSPID ATRESIA WITH D-TRANSPOSITION OF GREAT ARTERIES: ICD-10-CM

## 2020-07-27 DIAGNOSIS — I48.92 ATRIAL FLUTTER, UNSPECIFIED TYPE: ICD-10-CM

## 2020-07-27 DIAGNOSIS — Q25.42 VSD (VENTRICULAR SEPTAL DEFECT AND AORTIC ARCH HYPOPLASIA: ICD-10-CM

## 2020-07-27 DIAGNOSIS — Z95.0 PACEMAKER: ICD-10-CM

## 2020-07-27 DIAGNOSIS — Q20.3 TRICUSPID ATRESIA WITH D-TRANSPOSITION OF GREAT ARTERIES: ICD-10-CM

## 2020-07-27 RX ORDER — WARFARIN 2 MG/1
TABLET ORAL
Qty: 35 TABLET | Refills: 11 | Status: SHIPPED | OUTPATIENT
Start: 2020-07-27 | End: 2022-06-17 | Stop reason: SDUPTHER

## 2020-07-27 RX ORDER — WARFARIN 6 MG/1
TABLET ORAL
Qty: 30 TABLET | Refills: 11 | Status: SHIPPED | OUTPATIENT
Start: 2020-07-27 | End: 2020-11-27 | Stop reason: SDUPTHER

## 2020-07-27 RX ORDER — DIGOXIN 250 MCG
0.25 TABLET ORAL EVERY MORNING
Qty: 90 TABLET | Refills: 3 | Status: SHIPPED | OUTPATIENT
Start: 2020-07-27 | End: 2021-03-09 | Stop reason: SDUPTHER

## 2020-07-31 ENCOUNTER — TELEPHONE (OUTPATIENT)
Dept: PEDIATRIC CARDIOLOGY | Facility: CLINIC | Age: 45
End: 2020-07-31

## 2020-07-31 NOTE — TELEPHONE ENCOUNTER
Spoke to patient, informed Marcello is scheduled on Monday to do a remote transmission on device. Informed transmission can be sent anytime before Monday. Verbalized understanding.

## 2020-08-03 ENCOUNTER — CLINICAL SUPPORT (OUTPATIENT)
Dept: PEDIATRIC CARDIOLOGY | Facility: CLINIC | Age: 45
End: 2020-08-03
Attending: PEDIATRICS
Payer: COMMERCIAL

## 2020-08-03 DIAGNOSIS — Z95.0 PACEMAKER: ICD-10-CM

## 2020-08-03 DIAGNOSIS — I50.9 HEART FAILURE DUE TO CONGENITAL HEART DISEASE: ICD-10-CM

## 2020-08-03 DIAGNOSIS — Q24.9 ADULT CONGENITAL HEART DISEASE: ICD-10-CM

## 2020-08-03 DIAGNOSIS — Q24.9 HEART FAILURE DUE TO CONGENITAL HEART DISEASE: ICD-10-CM

## 2020-08-03 DIAGNOSIS — I48.92 ATRIAL FLUTTER, UNSPECIFIED TYPE: ICD-10-CM

## 2020-08-03 PROCEDURE — 93294 REM INTERROG EVL PM/LDLS PM: CPT | Mod: S$GLB,,, | Performed by: PEDIATRICS

## 2020-08-03 PROCEDURE — 93296 REM INTERROG EVL PM/IDS: CPT | Mod: S$GLB,,, | Performed by: PEDIATRICS

## 2020-08-03 PROCEDURE — 93296 CV PACEMAKER REMOTE PEDIATRICS (CUPID ONLY): ICD-10-PCS | Mod: S$GLB,,, | Performed by: PEDIATRICS

## 2020-08-03 PROCEDURE — 93294 CV PACEMAKER REMOTE PEDIATRICS (CUPID ONLY): ICD-10-PCS | Mod: S$GLB,,, | Performed by: PEDIATRICS

## 2020-08-04 DIAGNOSIS — Z95.0 PACEMAKER: ICD-10-CM

## 2020-08-04 DIAGNOSIS — Q21.0 VSD (VENTRICULAR SEPTAL DEFECT AND AORTIC ARCH HYPOPLASIA: Primary | ICD-10-CM

## 2020-08-04 DIAGNOSIS — I48.92 ATRIAL FLUTTER, UNSPECIFIED TYPE: ICD-10-CM

## 2020-08-04 DIAGNOSIS — Q25.42 VSD (VENTRICULAR SEPTAL DEFECT AND AORTIC ARCH HYPOPLASIA: Primary | ICD-10-CM

## 2020-08-04 DIAGNOSIS — Z98.890 S/P FONTAN PROCEDURE: ICD-10-CM

## 2020-08-04 DIAGNOSIS — I50.9 HEART FAILURE DUE TO CONGENITAL HEART DISEASE: ICD-10-CM

## 2020-08-04 DIAGNOSIS — Q24.9 HEART FAILURE DUE TO CONGENITAL HEART DISEASE: ICD-10-CM

## 2020-08-04 DIAGNOSIS — Q24.9 ADULT CONGENITAL HEART DISEASE: ICD-10-CM

## 2020-08-04 DIAGNOSIS — Q20.3 TRICUSPID ATRESIA WITH D-TRANSPOSITION OF GREAT ARTERIES: ICD-10-CM

## 2020-08-04 DIAGNOSIS — Q22.4 TRICUSPID ATRESIA WITH D-TRANSPOSITION OF GREAT ARTERIES: ICD-10-CM

## 2020-08-07 LAB
AV DELAY - LONGEST: 200 MSEC
BATTERY VOLTAGE (V): 2.74 V
ERI (V): 2.6 V
IMPEDANCE RA LEAD (NATIVE): 350 OHMS
IMPEDANCE RA LEAD: 290 OHMS
OHS CV DC PP MS1: 1.5 MS
OHS CV DC PP MS2: 0.5 MS
OHS CV DC PP V1: 2.5 V
OHS CV DC PP V2: 3 V
P/R-WAVE RA LEAD (NATIVE): 8.8 MV
P/R-WAVE RA LEAD: 0.5 MV
PV DELAY - LONGEST: 200 MSEC

## 2020-08-28 DIAGNOSIS — Q24.9 ADULT CONGENITAL HEART DISEASE: ICD-10-CM

## 2020-08-28 DIAGNOSIS — Z95.0 CARDIAC PACEMAKER IN SITU: ICD-10-CM

## 2020-08-28 DIAGNOSIS — I87.2 CHRONIC VENOUS INSUFFICIENCY: ICD-10-CM

## 2020-08-28 DIAGNOSIS — Q21.0 VSD (VENTRICULAR SEPTAL DEFECT AND AORTIC ARCH HYPOPLASIA: Primary | ICD-10-CM

## 2020-08-28 DIAGNOSIS — Q25.42 VSD (VENTRICULAR SEPTAL DEFECT AND AORTIC ARCH HYPOPLASIA: Primary | ICD-10-CM

## 2020-09-10 ENCOUNTER — HOSPITAL ENCOUNTER (OUTPATIENT)
Dept: CARDIOLOGY | Facility: HOSPITAL | Age: 45
Discharge: HOME | End: 2020-09-10
Attending: PEDIATRICS
Payer: COMMERCIAL

## 2020-09-10 ENCOUNTER — OFFICE VISIT (OUTPATIENT)
Dept: CARDIOLOGY | Facility: CLINIC | Age: 45
End: 2020-09-10
Payer: COMMERCIAL

## 2020-09-10 ENCOUNTER — CLINICAL SUPPORT (OUTPATIENT)
Dept: CARDIOLOGY | Facility: CLINIC | Age: 45
End: 2020-09-10
Attending: PEDIATRICS
Payer: COMMERCIAL

## 2020-09-10 ENCOUNTER — HOSPITAL ENCOUNTER (OUTPATIENT)
Dept: RADIOLOGY | Facility: HOSPITAL | Age: 45
Discharge: HOME | End: 2020-09-10
Attending: PEDIATRICS
Payer: COMMERCIAL

## 2020-09-10 ENCOUNTER — HOSPITAL ENCOUNTER (OUTPATIENT)
Dept: CARDIOLOGY | Facility: CLINIC | Age: 45
Discharge: HOME | End: 2020-09-10
Payer: COMMERCIAL

## 2020-09-10 ENCOUNTER — CLINICAL SUPPORT (OUTPATIENT)
Dept: PEDIATRIC CARDIOLOGY | Facility: CLINIC | Age: 45
End: 2020-09-10
Attending: PEDIATRICS
Payer: COMMERCIAL

## 2020-09-10 VITALS
DIASTOLIC BLOOD PRESSURE: 59 MMHG | SYSTOLIC BLOOD PRESSURE: 117 MMHG | DIASTOLIC BLOOD PRESSURE: 59 MMHG | HEIGHT: 63 IN | HEART RATE: 75 BPM | BODY MASS INDEX: 29.14 KG/M2 | WEIGHT: 164.44 LBS | OXYGEN SATURATION: 90 % | BODY MASS INDEX: 29.14 KG/M2 | HEART RATE: 75 BPM | HEIGHT: 63 IN | SYSTOLIC BLOOD PRESSURE: 117 MMHG | OXYGEN SATURATION: 90 % | WEIGHT: 164.44 LBS

## 2020-09-10 VITALS — HEIGHT: 63 IN | BODY MASS INDEX: 29.06 KG/M2 | WEIGHT: 164 LBS | HEART RATE: 75 BPM

## 2020-09-10 DIAGNOSIS — I48.92 ATRIAL FLUTTER, UNSPECIFIED TYPE: ICD-10-CM

## 2020-09-10 DIAGNOSIS — Q25.42 VSD (VENTRICULAR SEPTAL DEFECT AND AORTIC ARCH HYPOPLASIA: ICD-10-CM

## 2020-09-10 DIAGNOSIS — Z98.890 S/P FONTAN PROCEDURE: ICD-10-CM

## 2020-09-10 DIAGNOSIS — Q20.3 TRICUSPID ATRESIA WITH D-TRANSPOSITION OF GREAT ARTERIES: ICD-10-CM

## 2020-09-10 DIAGNOSIS — Q24.9 ADULT CONGENITAL HEART DISEASE: ICD-10-CM

## 2020-09-10 DIAGNOSIS — I50.9 HEART FAILURE DUE TO CONGENITAL HEART DISEASE: ICD-10-CM

## 2020-09-10 DIAGNOSIS — I87.2 CHRONIC VENOUS INSUFFICIENCY: ICD-10-CM

## 2020-09-10 DIAGNOSIS — Q21.0 VSD (VENTRICULAR SEPTAL DEFECT AND AORTIC ARCH HYPOPLASIA: ICD-10-CM

## 2020-09-10 DIAGNOSIS — Q24.9 PULMONARY ARTERIAL HYPERTENSION ASSOCIATED WITH CONGENITAL HEART DISEASE: ICD-10-CM

## 2020-09-10 DIAGNOSIS — Q22.4 TRICUSPID ATRESIA WITH D-TRANSPOSITION OF GREAT ARTERIES: ICD-10-CM

## 2020-09-10 DIAGNOSIS — Z95.0 CARDIAC PACEMAKER IN SITU: ICD-10-CM

## 2020-09-10 DIAGNOSIS — Q24.9 HEART FAILURE DUE TO CONGENITAL HEART DISEASE: ICD-10-CM

## 2020-09-10 DIAGNOSIS — Z95.0 PACEMAKER: ICD-10-CM

## 2020-09-10 DIAGNOSIS — R23.0 CYANOSIS: Primary | ICD-10-CM

## 2020-09-10 DIAGNOSIS — Z98.890 S/P FONTAN PROCEDURE: Primary | ICD-10-CM

## 2020-09-10 DIAGNOSIS — I27.29 PULMONARY ARTERIAL HYPERTENSION ASSOCIATED WITH CONGENITAL HEART DISEASE: ICD-10-CM

## 2020-09-10 LAB
AV INDEX (PROSTH): 1.01
AV MEAN GRADIENT: 1 MMHG
AV PEAK GRADIENT: 2 MMHG
AV VELOCITY RATIO: 1.03
BSA FOR ECHO PROCEDURE: 1.82 M2
CV ECHO LV RWT: 0.29 CM
DOP CALC AO PEAK VEL: 0.68 M/S
DOP CALC AO VTI: 13.22 CM
DOP CALC LVOT PEAK VEL: 0.7 M/S
DOP CALCLVOT PEAK VEL VTI: 13.31 CM
ECHO LV POSTERIOR WALL: 0.97 CM (ref 0.6–1.1)
FRACTIONAL SHORTENING: 25 % (ref 28–44)
INTERVENTRICULAR SEPTUM: 1.08 CM (ref 0.6–1.1)
LEFT ATRIUM SIZE: 4.96 CM
LEFT GREAT SAPHENOUS DISTAL THIGH DIA: 0.24 CM
LEFT GREAT SAPHENOUS JUNCTION DIA: 0.3 CM
LEFT GREAT SAPHENOUS KNEE DIA: 0.15 CM
LEFT GREAT SAPHENOUS MIDDLE THIGH DIA: 0.21 CM
LEFT GREAT SAPHENOUS PROXIMAL CALF DIA: 0.11 CM
LEFT INTERNAL DIMENSION IN SYSTOLE: 5.09 CM (ref 2.1–4)
LEFT SMALL SAPHENOUS KNEE DIA: 0.22 CM
LEFT SMALL SAPHENOUS SPJ DIA: 0.15 CM
LEFT VENTRICLE DIASTOLIC VOLUME INDEX: 132.67 ML/M2
LEFT VENTRICLE DIASTOLIC VOLUME: 235.81 ML
LEFT VENTRICLE MASS INDEX: 176 G/M2
LEFT VENTRICLE SYSTOLIC VOLUME INDEX: 69.5 ML/M2
LEFT VENTRICLE SYSTOLIC VOLUME: 123.52 ML
LEFT VENTRICULAR INTERNAL DIMENSION IN DIASTOLE: 6.76 CM (ref 3.5–6)
LEFT VENTRICULAR MASS: 312.5 G
MV PEAK E VEL: 0.64 M/S
PV PEAK VELOCITY: 0.79 CM/S
RIGHT GREAT SAPHENOUS DISTAL THIGH DIA: 0.12 CM
RIGHT GREAT SAPHENOUS JUNCTION DIA: 0.42 CM
RIGHT GREAT SAPHENOUS JUNCTION REFLUX: 2038 MS
RIGHT GREAT SAPHENOUS MIDDLE THIGH DIA: 0.3 CM
RIGHT GREAT SAPHENOUS MIDDLE THIGH REFLUX: 2043 MS
RIGHT SMALL SAPHENOUS KNEE DIA: 0.2 CM
RIGHT SMALL SAPHENOUS SPJ DIA: 0.22 CM

## 2020-09-10 PROCEDURE — 99215 OFFICE O/P EST HI 40 MIN: CPT | Mod: 25,S$GLB,, | Performed by: PEDIATRICS

## 2020-09-10 PROCEDURE — 3008F BODY MASS INDEX DOCD: CPT | Mod: CPTII,S$GLB,, | Performed by: PEDIATRICS

## 2020-09-10 PROCEDURE — 93970 EXTREMITY STUDY: CPT | Mod: TC

## 2020-09-10 PROCEDURE — 99999 PR PBB SHADOW E&M-EST. PATIENT-LVL III: CPT | Mod: PBBFAC,,, | Performed by: PEDIATRICS

## 2020-09-10 PROCEDURE — 93970 EXTREMITY STUDY: CPT | Mod: 26,,, | Performed by: INTERNAL MEDICINE

## 2020-09-10 PROCEDURE — 99999 PR PBB SHADOW E&M-EST. PATIENT-LVL I: CPT | Mod: PBBFAC,,,

## 2020-09-10 PROCEDURE — 93010 ELECTROCARDIOGRAM REPORT: CPT | Mod: S$GLB,,, | Performed by: INTERNAL MEDICINE

## 2020-09-10 PROCEDURE — 93975 US LIVER WITH DOPPLER: ICD-10-PCS | Mod: 26,,, | Performed by: RADIOLOGY

## 2020-09-10 PROCEDURE — 93325 ECHO (CUPID ONLY): ICD-10-PCS | Mod: 26,,, | Performed by: PEDIATRICS

## 2020-09-10 PROCEDURE — 93227 XTRNL ECG REC<48 HR R&I: CPT | Mod: S$GLB,,, | Performed by: PEDIATRICS

## 2020-09-10 PROCEDURE — 76705 ECHO EXAM OF ABDOMEN: CPT | Mod: TC

## 2020-09-10 PROCEDURE — 93280 CV PACEMAKER PROGRAMMING PEDIATRICS (CUPID ONLY): ICD-10-PCS | Mod: S$GLB,,, | Performed by: PEDIATRICS

## 2020-09-10 PROCEDURE — 76705 ECHO EXAM OF ABDOMEN: CPT | Mod: 26,XS,, | Performed by: RADIOLOGY

## 2020-09-10 PROCEDURE — 93227: ICD-10-PCS | Mod: S$GLB,,, | Performed by: PEDIATRICS

## 2020-09-10 PROCEDURE — 99214 PR OFFICE/OUTPT VISIT, EST, LEVL IV, 30-39 MIN: ICD-10-PCS | Mod: S$GLB,,, | Performed by: PEDIATRICS

## 2020-09-10 PROCEDURE — 3008F PR BODY MASS INDEX (BMI) DOCUMENTED: ICD-10-PCS | Mod: CPTII,S$GLB,, | Performed by: PEDIATRICS

## 2020-09-10 PROCEDURE — 99999 PR PBB SHADOW E&M-EST. PATIENT-LVL I: ICD-10-PCS | Mod: PBBFAC,,,

## 2020-09-10 PROCEDURE — 93005 ELECTROCARDIOGRAM TRACING: CPT | Mod: S$GLB,,, | Performed by: PEDIATRICS

## 2020-09-10 PROCEDURE — 99214 OFFICE O/P EST MOD 30 MIN: CPT | Mod: S$GLB,,, | Performed by: PEDIATRICS

## 2020-09-10 PROCEDURE — 99999 PR PBB SHADOW E&M-EST. PATIENT-LVL III: ICD-10-PCS | Mod: PBBFAC,,, | Performed by: PEDIATRICS

## 2020-09-10 PROCEDURE — 93010 EKG 12-LEAD: ICD-10-PCS | Mod: S$GLB,,, | Performed by: INTERNAL MEDICINE

## 2020-09-10 PROCEDURE — 93303 ECHO (CUPID ONLY): ICD-10-PCS | Mod: 26,,, | Performed by: PEDIATRICS

## 2020-09-10 PROCEDURE — 93975 VASCULAR STUDY: CPT | Mod: TC

## 2020-09-10 PROCEDURE — 93303 ECHO TRANSTHORACIC: CPT | Mod: 26,,, | Performed by: PEDIATRICS

## 2020-09-10 PROCEDURE — 93325 DOPPLER ECHO COLOR FLOW MAPG: CPT | Mod: 26,,, | Performed by: PEDIATRICS

## 2020-09-10 PROCEDURE — 93303 ECHO TRANSTHORACIC: CPT

## 2020-09-10 PROCEDURE — 76705 US LIVER WITH DOPPLER: ICD-10-PCS | Mod: 26,XS,, | Performed by: RADIOLOGY

## 2020-09-10 PROCEDURE — 93975 VASCULAR STUDY: CPT | Mod: 26,,, | Performed by: RADIOLOGY

## 2020-09-10 PROCEDURE — 93320 DOPPLER ECHO COMPLETE: CPT | Mod: 26,,, | Performed by: PEDIATRICS

## 2020-09-10 PROCEDURE — 93970 CV US LOWER VENOUS INSUFFICIENCY BILATERAL (CUPID ONLY): ICD-10-PCS | Mod: 26,,, | Performed by: INTERNAL MEDICINE

## 2020-09-10 PROCEDURE — 93005 EKG 12-LEAD: ICD-10-PCS | Mod: S$GLB,,, | Performed by: PEDIATRICS

## 2020-09-10 PROCEDURE — 93320 ECHO (CUPID ONLY): ICD-10-PCS | Mod: 26,,, | Performed by: PEDIATRICS

## 2020-09-10 PROCEDURE — 99215 PR OFFICE/OUTPT VISIT, EST, LEVL V, 40-54 MIN: ICD-10-PCS | Mod: 25,S$GLB,, | Performed by: PEDIATRICS

## 2020-09-10 PROCEDURE — 93280 PM DEVICE PROGR EVAL DUAL: CPT | Mod: S$GLB,,, | Performed by: PEDIATRICS

## 2020-09-10 NOTE — PROGRESS NOTES
"09/10/2020    Patient Name: LEVON GANDHI  YOB: 1975  [facility] Number: [id number]    Kevin Chaves MD  [address]  [place] [address]    MIRIAM Garcia MD  [address]   [address]  [place] [address]    Dear Dr. Chaves and Jose:    It is a pleasure to see Levon at our main campus adult congenital cardiology clinic to evaluate tricuspid atresia.    Levon is a 45 y.o. Male with a complex past medical history. To summarize, his diagnoses are as follows:  1. 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   - History of a classic Roger shunt and a 12 mm conduit from the right atrium to the left pulmonary artery along with a Damus Judy Stansel operation    - now with mild native and brigette-aortic insufficiency   - lateral tunnel Fontan with a 16 mm ring Goretex graft from the left pulmonary artery to the right pulmonary artery.   - elevated central venous pressures with mild desaturation at least partially due to collateral vessels - improved after cath 2010 but still an issue.   - mild mitral regurgitation    - Last liver US September 2020  2. 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     - arrhythmias typically manifest as "back pain"   - nonsustained VT, Noninvasive EP study (NIEPS) negative 2016  3. Varicose veins followed in the past by vascular Medicine.  Improved pain and swelling.  Of note, the patient was accessed via the right femoral vein without difficulty at the 2010 catheterization.  4. No evidence of protein-losing enteropathy.    5. obstructive sleep apnea  6.  Pacemaker ending near end of life  7.  Mildly decreased systemic ventricular function    Discussion:  Overall, he looks better than he did a year ago.  He and I both attributed that to his improved diet with some weight loss.  He has definite " chronic venous insufficiency in his legs, but his symptoms are actually improved.  So was his dyspnea on exertion.  He has not had a cardiac catheterization in a long time, and he would definitely benefit from this.  First we will get his pacemaker replaced, and then we will likely get the catheterization next year.    My plan is as follows:  1.  Continue to treat obstructive sleep apnea  2.  Continue current medications  3.  Follow up in 1 year with echo, EKG, EP visit, baseline labs, and liver ultrasound.  He will have his pacemaker replaced in the next few months, and he will follow up closely with electrophysiology.    4.  Referral to vascular medicine to help with chronic venous insufficiency.  5.  Strongly consider a repeat cardiac catheterization in the next year or 2 as it has been over 10 years since his last study.  If any other symptoms worsen, we will get the cath sooner.    Interval history:  I last saw him about a year ago.  Overall, he has done well since that time.  He denies any baseline chest pain, shortness of breath, worsening cyanosis, syncope, or near-syncope.  He does have a lot of issues with swelling in the right leg.  This occurs when he has been on his feet for a long time.  It gets better with elevation and Lasix.  Overall, he feels like his leg edema is better compared to a year ago.  He attributes this to a much better diet.  He is avoiding sodas.  He has lost some weight..  His dyspnea on exertion is also a little bit better than it was a year ago.  He denies diarrhea.  He denies any other swelling.  He denies palpitations.  He otherwise felt completely fine.  However, he admits that he has never felt palpitations.  His arrhythmias always presented as upper back pain.    He was seen by EP today.  His pacemaker is nearing end of life.    On July 7, 2010 he underwent a cardiac catheterization:   1. Complex univentricular cardiac defect with superior inferior ventricles with double inlet  left ventricle (atrioventricular connection not defined at catheterization), status post Wxpnv-Jtfd-Lkvvgct connection and intracardiac lateral tunnel Fontan.   2. Moderate neoaortic (pulmonary) valve insufficiency.   3. Left aorta.   4. 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   5. Elevated central venous pressure (mean 19), transpulmonary gradient 4-5 mmHg.    Past Medical History:   Diagnosis Date    Asthma     Atrial flutter     Cyanosis     Heart murmur     Intra-atrial reentrant tachycardia     MIGUEL (obstructive sleep apnea) 10/23/2014    TGA (transposition of great arteries)     Tricuspid atresia     VSD (ventricular septal defect)      Past Surgical History:   Procedure Laterality Date    CARDIAC CATHETERIZATION      FONTAN PROCEDURE, EXTRACARDIAC       Family History   Problem Relation Age of Onset    No Known Problems Mother     No Known Problems Father     No Known Problems Sister     No Known Problems Brother     No Known Problems Maternal Grandmother     Heart disease Maternal Grandfather     No Known Problems Paternal Grandmother     Diabetes Paternal Grandfather     No Known Problems Maternal Aunt     No Known Problems Maternal Uncle     Diabetes Paternal Aunt     No Known Problems Paternal Uncle     Atrial Septal Defect Neg Hx     Anemia Neg Hx     Arrhythmia Neg Hx     Asthma Neg Hx     Clotting disorder Neg Hx     Fainting Neg Hx     Heart attack Neg Hx     Heart failure Neg Hx     Hyperlipidemia Neg Hx     Hypertension Neg Hx     Stroke Neg Hx      Social History     Socioeconomic History    Marital status: Legally      Spouse name: Not on file    Number of children: Not on file    Years of education: Not on file    Highest education level: Not on file   Occupational History    Not on file   Social Needs    Financial resource strain: Not on file    Food insecurity     Worry: Not on file     Inability: Not on file     Transportation needs     Medical: Not on file     Non-medical: Not on file   Tobacco Use    Smoking status: Never Smoker    Smokeless tobacco: Never Used   Substance and Sexual Activity    Alcohol use: No    Drug use: No    Sexual activity: Not on file   Lifestyle    Physical activity     Days per week: Not on file     Minutes per session: Not on file    Stress: Not on file   Relationships    Social connections     Talks on phone: Not on file     Gets together: Not on file     Attends Orthodoxy service: Not on file     Active member of club or organization: Not on file     Attends meetings of clubs or organizations: Not on file     Relationship status: Not on file   Other Topics Concern    Not on file   Social History Narrative    He is working at [employer].     Current Outpatient Medications on File Prior to Visit   Medication Sig Dispense Refill    digoxin (LANOXIN) 250 mcg tablet Take 1 tablet (0.25 mg total) by mouth every morning. 90 tablet 3    enalapril (VASOTEC) 10 MG tablet TAKE 1 TABLET BY MOUTH TWICE A  tablet 3    furosemide (LASIX) 20 MG tablet TAKE 1 TABLET (20 MG TOTAL) BY MOUTH ONCE DAILY. 60 tablet 6    metoprolol succinate (TOPROL-XL) 100 MG 24 hr tablet TAKE 3 TABLETS (300 MG TOTAL) BY MOUTH ONCE DAILY. 270 tablet 3    sildenafil (REVATIO) 20 mg Tab TAKE 1 TABLET BY MOUTH TWICE A  tablet 3    sotaloL (BETAPACE) 240 MG Tab Take 1 tablet (240 mg total) by mouth 2 (two) times daily. 60 tablet 11    warfarin (COUMADIN) 2 MG tablet Take 10 mg (6mg + 2 (2mg) tabs every Saturday and 8mg (6mg +2 mg) all other days as directed (Patient taking differently: Take 8 mg daily (6mg + 2mg)) 35 tablet 11    warfarin (COUMADIN) 6 MG tablet TAKE 10MG (6MG + 2 (2MG) TABS EVERY SATURDAY AND 8MG (6MG + 2MG) TABS ALL OTHER DAYS AS DIRECTED 30 tablet 11     No current facility-administered medications on file prior to visit.      Review of patient's allergies indicates:  No Known  "Allergies     BP (!) 117/59 (BP Location: Left arm, Patient Position: Sitting, BP Method: Large (Automatic))   Pulse 75   Ht 5' 3" (1.6 m)   Wt 74.6 kg (164 lb 7.4 oz)   SpO2 (!) 90%   BMI 29.13 kg/m²   Weight: 74.6 kg (164 lb 7.4 oz)     In general, his baseline ruddiness is stable.  He is a white male in no apparent distress. Head is normocephalic and atraumatic. The eyes, nares, and oropharynx are clear. The neck is supple without obvious jugular venous distention or lymphadenopathy, and his face does not look swollen. Lungs are clear to auscultation bilaterally. The chest is symmetrical. Multiple well-healed surgical scars are noted. The pacemaker is palpated in the right upper chest. The area is nontender without evidence of infection. The precordium is quiet. First heart sound is normal. A loud single second heart sound is auscultated. A grade 1/6 systolic ejection murmur is auscultated. No diastolic murmurs or gallops are heard. The abdominal exam reveals a liver edge palpated about 2 cm below the right costal margin. It is not pulsatile. He abdomen is soft and nontender without evidence of ascites. I could not palpate his spleen. There is no significant clubbing and no obvious cyanosis. He has good pulses in his legs bilaterally. There is trivial edema in the mild pitting edema in the right leg up to about the mid shin. He does have varicose veins.  No palpable cords or calf tenderness.    I personally reviewed the following studies:    His echo reveals:  Images consistent with tricuspid atresia, d-TGA, VSD and RV hypoplasia S/P DKS and lateral tunnel Fontan.  Qualitative impression of mildly decreased LV function compared to previous study with no other significant changes noted.  Dilated inferior vena cava and hepatic veins joining very dilated lateral tunnel Fontan and slow moving spontaneous contrast, laminar flow and no obvious thrombus in IVC, hepatic veins, proximal lateral tunnel or SVC.  Unable " to demonstrate remaining segments of Fontan circuit and pulmonary arteries with very limited suprasternal windows.  Unrestricted bidirectional flow at atrial septum.   Mild to moderate  mitral valve insufficiency.   Dilated left ventricle - severe.   Qualitatively reduced left (single) ventricle systolic function with SF = 25% and EF estimated 40 -45% from apical views.   Unrestricted flow at VSD to anterior aorta and small subaortic chamber (Right ventricle).   Anterior native aortic valve with no stenosis and mild insufficiency.   Posteriorly positioned trileaflet pulmonary valve committed to the left ventricle with no left ventricular outflow obstruction, mild insufficiency and no valvar stenosis.   Good imaging of the DKS anastomosis with no evidence of obstruction in either limb.      His EKG reveals AV sequential pacing with a long NJ interval and good capture.      Results for LEVON GANDHI (MRN [id number]) as of   Lab Results   Component Value Date    WBC 3.93 09/10/2020    HGB 16.5 09/10/2020    HCT 51.3 09/10/2020    MCV 96 09/10/2020    PLT 99 (L) 09/10/2020       CMP  Sodium   Date Value Ref Range Status   09/10/2020 139 136 - 145 mmol/L Final     Potassium   Date Value Ref Range Status   09/10/2020 4.7 3.5 - 5.1 mmol/L Final     Chloride   Date Value Ref Range Status   09/10/2020 104 95 - 110 mmol/L Final     CO2   Date Value Ref Range Status   09/10/2020 29 23 - 29 mmol/L Final     Glucose   Date Value Ref Range Status   09/10/2020 96 70 - 110 mg/dL Final     BUN, Bld   Date Value Ref Range Status   09/10/2020 17 6 - 20 mg/dL Final     Creatinine   Date Value Ref Range Status   09/10/2020 0.9 0.5 - 1.4 mg/dL Final     Calcium   Date Value Ref Range Status   09/10/2020 9.0 8.7 - 10.5 mg/dL Final     Total Protein   Date Value Ref Range Status   09/10/2020 7.3 6.0 - 8.4 g/dL Final     Albumin   Date Value Ref Range Status   09/10/2020 4.3 3.5 - 5.2 g/dL Final     Total Bilirubin   Date Value  Ref Range Status   09/10/2020 1.2 (H) 0.1 - 1.0 mg/dL Final     Comment:     For infants and newborns, interpretation of results should be based  on gestational age, weight and in agreement with clinical  observations.  Premature Infant recommended reference ranges:  Up to 24 hours.............<8.0 mg/dL  Up to 48 hours............<12.0 mg/dL  3-5 days..................<15.0 mg/dL  6-29 days.................<15.0 mg/dL       Alkaline Phosphatase   Date Value Ref Range Status   09/10/2020 81 55 - 135 U/L Final     AST   Date Value Ref Range Status   09/10/2020 28 10 - 40 U/L Final     ALT   Date Value Ref Range Status   09/10/2020 24 10 - 44 U/L Final     Anion Gap   Date Value Ref Range Status   09/10/2020 6 (L) 8 - 16 mmol/L Final     eGFR if    Date Value Ref Range Status   09/10/2020 >60.0 >60 mL/min/1.73 m^2 Final     eGFR if non    Date Value Ref Range Status   09/10/2020 >60.0 >60 mL/min/1.73 m^2 Final     Comment:     Calculation used to obtain the estimated glomerular filtration  rate (eGFR) is the CKD-EPI equation.        Results for LEVON GANDHI (MRN [id number]) as of 9/10/2020 22:51   Ref. Range 9/10/2020 08:00   Iron Latest Ref Range: 45 - 160 ug/dL 81   TIBC Latest Ref Range: 250 - 450 ug/dL 440   Saturated Iron Latest Ref Range: 20 - 50 % 18 (L)   Transferrin Latest Ref Range: 200 - 375 mg/dL 297   Ferritin Latest Ref Range: 20.0 - 300.0 ng/mL 100     Results for LEVON GANDHI (MRN [id number]) as of 9/10/2020 22:51   Ref. Range 9/10/2020 08:00   TSH Latest Ref Range: 0.400 - 4.000 uIU/mL 2.863   AFP Latest Ref Range: 0.0 - 8.4 ng/mL 2.1     Results for LEVON GANDHITIN (MRN [id number]) as of 9/10/2020 22:51   Ref. Range 9/4/2015 07:20 7/26/2018 14:13 9/10/2020 08:00   BNP Latest Ref Range: 0 - 99 pg/mL 106 (H) 193 (H) 118 (H)     Abdominal US 9/10/20:  Hepatic cirrhosis and splenomegaly.    LE US 9/10/20:  · No evidence of right lower  extremity DVT.  · No evidence of left lower extremity DVT.  · Right greater saphenous superficial vein reflux is present.  · Right common and deep femoral vein reflux is present.    Thank you for referring this patient to our clinic. Please call with any questions.    Sincerely,        Dereje Acosta MD  Pediatric Cardiology  Adult Congenital Heart Disease  Pediatric Heart Failure and Transplantation  [facility] [address] [place]  [address]  [phone]

## 2020-09-10 NOTE — PROGRESS NOTES
Thank you for referring your patient Marcello Briggs to the adult congenital electrophysiology clinic for consultation. Please review my findings below.    CHIEF COMPLAINT: F/u for arrhythmias.    HISTORY OF PRESENT ILLNESS:  Marcello is a 45 y.o. Male with a complex past medical history. Followed by Dr. Acosta in the [facility] clinic.     To summarize, his diagnoses are as follows:  1. 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  2. History of a classic Roger shunt and a 12 mm conduit from the right atrium to the left pulmonary artery along with a Damus Judy Stansel operation - now with trivial native and mild to moderate brigette-aortic insufficiency   3. Subsequent lateral tunnel Fontan operation utilizing a 16 mm ring Goretex graft from the left pulmonary artery to the right pulmonary artery.  4. 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 Last cardioversion several years ago  5. Significantly elevated central venous pressures with chronic desaturation at least partially due to a collateral vessels - improved after catheterization.  6. Varicose veins followed by vascular medicine - improved.  7. No evidence of protein-losing enteropathy.  8. Mild AV valve insuffiency.  9 High ventricular rates on his device s/p negative NIEPS on 4/6/16    On July 7, 2010 he underwent a cardiac catheterization:   1. Complex univentricular cardiac defect with superior inferior ventricles with double inlet left ventricle (atrioventricular connection not defined at catheterization), status post Lzsya-Bzom-Ermlbsi connection and intracardiac lateral tunnel Fontan.   2. Moderate neoaortic (pulmonary) valve insufficiency.   3. Left aorta.   4. 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   5. Elevated central venous pressure (mean 19), transpulmonary gradient 4-5 mmHg.    Marcello  was seen in clinic in December 2019.  He returns today for scheduled follow up.  He is overall doing well.  He has been having some swelling and cramping in his right leg where he has varicose veins.  He was on his feet working a lot of hours at that time.  He has no chest pain or difficulty breathing.  He has no syncope or near syncope.  He has good activity.  He has no fatigue by report.      REVIEW OF SYSTEMS:   GENERAL: No fever, chills,or weight loss.    SKIN: No rashes, itching or changes in color or texture of skin.  CHEST: see HPI  CARDIOVASCULAR: Denies chest pain or reduced exercise tolerance.  ABDOMEN: Appetite fine. No weight loss. Denies diarrhea, abdominal pain, or vomiting.  NEUROLOGIC: No history of seizures,  alteration of gait or coordination.    PAST MEDICAL HISTORY:   Past Medical History:   Diagnosis Date    Asthma     Atrial flutter     Cyanosis     Heart murmur     Intra-atrial reentrant tachycardia     MIGUEL (obstructive sleep apnea) 10/23/2014    TGA (transposition of great arteries)     Tricuspid atresia     VSD (ventricular septal defect)        FAMILY HISTORY:   The family history is negative for congenital heart disease and sudden death in individuals less than 40 years old.    SOCIAL HISTORY:   Social History     Socioeconomic History    Marital status: Legally      Spouse name: Not on file    Number of children: Not on file    Years of education: Not on file    Highest education level: Not on file   Occupational History    Not on file   Social Needs    Financial resource strain: Not on file    Food insecurity     Worry: Not on file     Inability: Not on file    Transportation needs     Medical: Not on file     Non-medical: Not on file   Tobacco Use    Smoking status: Never Smoker    Smokeless tobacco: Never Used   Substance and Sexual Activity    Alcohol use: No    Drug use: No    Sexual activity: Not on file   Lifestyle    Physical activity     Days per  "week: Not on file     Minutes per session: Not on file    Stress: Not on file   Relationships    Social connections     Talks on phone: Not on file     Gets together: Not on file     Attends Holiness service: Not on file     Active member of club or organization: Not on file     Attends meetings of clubs or organizations: Not on file     Relationship status: Not on file   Other Topics Concern    Not on file   Social History Narrative    He is working at [employer].       ALLERGIES:  No Known Allergies    MEDICATIONS:    Current Outpatient Medications:     digoxin (LANOXIN) 250 mcg tablet, Take 1 tablet (0.25 mg total) by mouth every morning., Disp: 90 tablet, Rfl: 3    enalapril (VASOTEC) 10 MG tablet, TAKE 1 TABLET BY MOUTH TWICE A DAY, Disp: 180 tablet, Rfl: 3    furosemide (LASIX) 20 MG tablet, TAKE 1 TABLET (20 MG TOTAL) BY MOUTH ONCE DAILY., Disp: 60 tablet, Rfl: 6    metoprolol succinate (TOPROL-XL) 100 MG 24 hr tablet, TAKE 3 TABLETS (300 MG TOTAL) BY MOUTH ONCE DAILY., Disp: 270 tablet, Rfl: 3    sildenafil (REVATIO) 20 mg Tab, TAKE 1 TABLET BY MOUTH TWICE A DAY, Disp: 180 tablet, Rfl: 3    sotaloL (BETAPACE) 240 MG Tab, Take 1 tablet (240 mg total) by mouth 2 (two) times daily., Disp: 60 tablet, Rfl: 11    warfarin (COUMADIN) 2 MG tablet, Take 10 mg (6mg + 2 (2mg) tabs every Saturday and 8mg (6mg +2 mg) all other days as directed (Patient taking differently: Take 8 mg daily (6mg + 2mg)), Disp: 35 tablet, Rfl: 11    warfarin (COUMADIN) 6 MG tablet, TAKE 10MG (6MG + 2 (2MG) TABS EVERY SATURDAY AND 8MG (6MG + 2MG) TABS ALL OTHER DAYS AS DIRECTED, Disp: 30 tablet, Rfl: 11      PHYSICAL EXAM:   Vitals:    09/10/20 1305 09/10/20 1306   BP: (!) 118/56 (!) 117/59   BP Location: Right arm Left arm   Patient Position: Sitting Sitting   BP Method: Large (Automatic) Large (Automatic)   Pulse: 75 75   SpO2: (!) 90%    Weight: 74.6 kg (164 lb 7.4 oz)    Height: 5' 3" (1.6 m)        GENERAL: Awake, " well-developed well-nourished, no apparent distress  HEENT: mucous membranes moist and pink, normocephalic atraumatic, no cranial or carotid bruits, sclera anicteric  NECK: no jugular venous distention, no lymphadenopathy  CHEST: Good air movement, clear to auscultation bilaterally.  Pacemaker incision healing well.    CARDIOVASCULAR: Quiet precordium, regular rate and rhythm, no rubs or gallops.  A grade 1/6 systolic ejection murmur is auscultated. No diastolic murmurs or gallops are heard.  ABDOMEN: Soft, nontender nondistended, Liver edge palpated 1 cm below costal margin.  EXTREMITIES: Warm well perfused, 2+ radial/pedal pulses, capillary refill 2 seconds, no clubbing, cyanosis, or edema  NEURO: Alert and oriented, cooperative with exam, face symmetric, moves all extremities well      STUDIES:  ECG: AV sequential pacing with prolonged AV delay.    Pacemaker Interrogation:   Following physician: Dereje    Permanent Programming   RA Lead: 2.5 V @ 1.5 ms. Sensitivity: 0.5 mV.   RV Lead: 3 V @ 0.5 ms. Sensitivity: 2 mV.     Pacemaker Generator and Leads meet standard of FDA approval.     Chamber type: dual.   Mode: DDDR   Lower limit rate: 75 bpm   Upper tracking rate: 130 bpm     AV Delay  Longest: 200 msec       PV Delay  Longest: 200 msec   Device Analysis 2    Battery voltage: 2.68 V  BV @ BLANK: 2.60 V  Estimated longevity: 2-3 2.9 months to BLANK .   Magnet Rate: 55.3 ppm      Leads  RA Lead:        P/R-wave: Paced mV       Lead Impedance: 300 Ohms       Paced: 98%   RV Lead:        P/R-wave: 10  mV       Impedance: 330 Ohms      Thresholds  RA Lead: 0.5 V @ 1.5 ms. Configuration: unipolar.   RV Lead: 1.25 V @ 0.5 ms. Configuration: bipolar.   Device Comments    Wound check comments:   Healed incision     Reprogramming comments:   No changes this session     General comments:   Device interrogation and lead testing performed. Device and leads WNL.  Approximately 2.9 months to BLANK.WIll schedule monthly  transmissions.     AMS x2 - available EGM's reveal noise artifact on Atrial lead    Scheduled for REMOTE transmission on Monday, October 12, 2020         ASSESSMENT:  45 y.o. male with TGA and tricuspid atresia s/p Fontan with pacemaker.  He is asymptomatic at present.       PLAN:   - Metoprolol 300 mg XL daily  - Sotalol 240 mg po BID  - Continue Dig to 250mcg daily  - Continue anticoagulation  - Patient asked to call with palpitations, syncope, worsening fatigue, chest pain, or any other questions or concerns in the interim.  - Schedule follow up in 6 months with ECG, and pacemaker check.  - SBE prophylaxis as per Dr. Acosta  - Holter today  - Schedule for battery replacement with Dr. Garcia        The patient's doctor will be notified via EPIC/FAX    I hope this brings you up-to-date on Marcello Briggs  Please contact me with any questions or concerns.    Jaz Reyez M.D.  Pediatric Cardiology   Pediatric Electrophysiology

## 2020-09-11 LAB
AV DELAY - LONGEST: 200 MSEC
BATTERY VOLTAGE (V): 2.68 V
ERI (V): 2.6 V
IMPEDANCE RA LEAD (NATIVE): 330 OHMS
IMPEDANCE RA LEAD: 300 OHMS
OHS CV DC PP MS1: 1.5 MS
OHS CV DC PP MS2: 0.5 MS
OHS CV DC PP V1: 2.5 V
OHS CV DC PP V2: 3 V
P/R-WAVE RA LEAD (NATIVE): 10 MV
PV DELAY - LONGEST: 200 MSEC
THRESHOLD MS RA LEAD (NATIVE): 0.5 MS
THRESHOLD MS RA LEAD: 1.5 MS
THRESHOLD V RA LEAD (NATIVE): 1.25 V
THRESHOLD V RA LEAD: 0.5 V

## 2020-09-17 ENCOUNTER — TELEPHONE (OUTPATIENT)
Dept: VASCULAR SURGERY | Facility: CLINIC | Age: 45
End: 2020-09-17

## 2020-09-17 DIAGNOSIS — Q25.42 VSD (VENTRICULAR SEPTAL DEFECT AND AORTIC ARCH HYPOPLASIA: ICD-10-CM

## 2020-09-17 DIAGNOSIS — I48.92 ATRIAL FLUTTER, UNSPECIFIED TYPE: ICD-10-CM

## 2020-09-17 DIAGNOSIS — Z98.890 S/P FONTAN PROCEDURE: ICD-10-CM

## 2020-09-17 DIAGNOSIS — Q22.4 TRICUSPID ATRESIA WITH D-TRANSPOSITION OF GREAT ARTERIES: ICD-10-CM

## 2020-09-17 DIAGNOSIS — Z79.01 LONG TERM CURRENT USE OF ANTICOAGULANT THERAPY: ICD-10-CM

## 2020-09-17 DIAGNOSIS — Q21.0 VSD (VENTRICULAR SEPTAL DEFECT AND AORTIC ARCH HYPOPLASIA: ICD-10-CM

## 2020-09-17 DIAGNOSIS — Q20.3 TRICUSPID ATRESIA WITH D-TRANSPOSITION OF GREAT ARTERIES: ICD-10-CM

## 2020-09-17 DIAGNOSIS — Z95.0 PACEMAKER: Primary | ICD-10-CM

## 2020-09-17 NOTE — TELEPHONE ENCOUNTER
Spoke with Marcello to schedule upcoming pacemaker generator replacement surgery, chose October 8, 2020 at 0730. Explained to Marcello will schedule his consult visit with Dr Garcia and pre op testing on October 2, 2020; appointments to be mailed.  Explained Coumadin will need to be stopped in preparation for surgery, take last dose on Saturday October 3, 2020.  Dr Reyez notified of surgery date.

## 2020-09-18 LAB
OHS CV EVENT MONITOR DAY: 1
OHS CV HOLTER LENGTH DECIMAL HOURS: 26
OHS CV HOLTER LENGTH HOURS: 2
OHS CV HOLTER LENGTH MINUTES: 0

## 2020-10-01 ENCOUNTER — PATIENT MESSAGE (OUTPATIENT)
Dept: CARDIOLOGY | Facility: CLINIC | Age: 45
End: 2020-10-01

## 2020-10-01 RX ORDER — METOPROLOL SUCCINATE 100 MG/1
300 TABLET, EXTENDED RELEASE ORAL DAILY
Qty: 270 TABLET | Refills: 3 | Status: SHIPPED | OUTPATIENT
Start: 2020-10-01 | End: 2021-11-21

## 2020-10-02 ENCOUNTER — CLINICAL SUPPORT (OUTPATIENT)
Dept: PEDIATRIC CARDIOLOGY | Facility: CLINIC | Age: 45
End: 2020-10-02
Payer: COMMERCIAL

## 2020-10-02 ENCOUNTER — HOSPITAL ENCOUNTER (OUTPATIENT)
Dept: RADIOLOGY | Facility: HOSPITAL | Age: 45
Discharge: HOME | End: 2020-10-02
Attending: THORACIC SURGERY (CARDIOTHORACIC VASCULAR SURGERY)
Payer: COMMERCIAL

## 2020-10-02 ENCOUNTER — OFFICE VISIT (OUTPATIENT)
Dept: PEDIATRIC CARDIOLOGY | Facility: CLINIC | Age: 45
End: 2020-10-02
Payer: COMMERCIAL

## 2020-10-02 ENCOUNTER — TELEPHONE (OUTPATIENT)
Dept: VASCULAR SURGERY | Facility: CLINIC | Age: 45
End: 2020-10-02

## 2020-10-02 ENCOUNTER — INITIAL CONSULT (OUTPATIENT)
Dept: VASCULAR SURGERY | Facility: CLINIC | Age: 45
End: 2020-10-02
Payer: COMMERCIAL

## 2020-10-02 ENCOUNTER — DOCUMENTATION ONLY (OUTPATIENT)
Dept: VASCULAR SURGERY | Facility: CLINIC | Age: 45
End: 2020-10-02

## 2020-10-02 VITALS
DIASTOLIC BLOOD PRESSURE: 58 MMHG | BODY MASS INDEX: 29.36 KG/M2 | HEART RATE: 75 BPM | SYSTOLIC BLOOD PRESSURE: 135 MMHG | DIASTOLIC BLOOD PRESSURE: 61 MMHG | OXYGEN SATURATION: 92 % | HEART RATE: 75 BPM | SYSTOLIC BLOOD PRESSURE: 130 MMHG | OXYGEN SATURATION: 92 % | HEIGHT: 63 IN | WEIGHT: 165.56 LBS | BODY MASS INDEX: 29.34 KG/M2 | WEIGHT: 165.69 LBS | HEIGHT: 63 IN

## 2020-10-02 DIAGNOSIS — Z95.0 PACEMAKER: Primary | ICD-10-CM

## 2020-10-02 DIAGNOSIS — Z79.01 LONG TERM CURRENT USE OF ANTICOAGULANT THERAPY: ICD-10-CM

## 2020-10-02 DIAGNOSIS — I50.9 HEART FAILURE DUE TO CONGENITAL HEART DISEASE: ICD-10-CM

## 2020-10-02 DIAGNOSIS — Q21.0 VSD (VENTRICULAR SEPTAL DEFECT AND AORTIC ARCH HYPOPLASIA: ICD-10-CM

## 2020-10-02 DIAGNOSIS — Z95.0 PACEMAKER: ICD-10-CM

## 2020-10-02 DIAGNOSIS — Q22.4 TRICUSPID ATRESIA WITH D-TRANSPOSITION OF GREAT ARTERIES: ICD-10-CM

## 2020-10-02 DIAGNOSIS — Z45.010 PACEMAKER GENERATOR END OF LIFE: ICD-10-CM

## 2020-10-02 DIAGNOSIS — Q20.3 TRICUSPID ATRESIA WITH D-TRANSPOSITION OF GREAT ARTERIES: ICD-10-CM

## 2020-10-02 DIAGNOSIS — Q25.42 VSD (VENTRICULAR SEPTAL DEFECT AND AORTIC ARCH HYPOPLASIA: ICD-10-CM

## 2020-10-02 DIAGNOSIS — Z98.890 S/P FONTAN PROCEDURE: ICD-10-CM

## 2020-10-02 DIAGNOSIS — Z95.0 CARDIAC PACEMAKER IN SITU: ICD-10-CM

## 2020-10-02 DIAGNOSIS — I48.92 ATRIAL FLUTTER, UNSPECIFIED TYPE: ICD-10-CM

## 2020-10-02 DIAGNOSIS — I49.9 VENTRICULAR ARRHYTHMIA: ICD-10-CM

## 2020-10-02 DIAGNOSIS — Q24.9 HEART FAILURE DUE TO CONGENITAL HEART DISEASE: ICD-10-CM

## 2020-10-02 DIAGNOSIS — Q24.9 ADULT CONGENITAL HEART DISEASE: Primary | ICD-10-CM

## 2020-10-02 PROCEDURE — 71046 X-RAY EXAM CHEST 2 VIEWS: CPT | Mod: TC

## 2020-10-02 PROCEDURE — 99999 PR PBB SHADOW E&M-EST. PATIENT-LVL IV: ICD-10-PCS | Mod: PBBFAC,,, | Performed by: THORACIC SURGERY (CARDIOTHORACIC VASCULAR SURGERY)

## 2020-10-02 PROCEDURE — 99999 PR PBB SHADOW E&M-EST. PATIENT-LVL I: CPT | Mod: PBBFAC,,,

## 2020-10-02 PROCEDURE — 93000 ELECTROCARDIOGRAM COMPLETE: CPT | Mod: S$GLB,,, | Performed by: PEDIATRICS

## 2020-10-02 PROCEDURE — 93000 EKG 12-LEAD PEDIATRIC: ICD-10-PCS | Mod: S$GLB,,, | Performed by: PEDIATRICS

## 2020-10-02 PROCEDURE — 71046 XR CHEST PA AND LATERAL: ICD-10-PCS | Mod: 26,,, | Performed by: RADIOLOGY

## 2020-10-02 PROCEDURE — 99999 PR PBB SHADOW E&M-EST. PATIENT-LVL IV: CPT | Mod: PBBFAC,,, | Performed by: PHYSICIAN ASSISTANT

## 2020-10-02 PROCEDURE — 99999 PR PBB SHADOW E&M-EST. PATIENT-LVL IV: ICD-10-PCS | Mod: PBBFAC,,, | Performed by: PHYSICIAN ASSISTANT

## 2020-10-02 PROCEDURE — 99999 PR PBB SHADOW E&M-EST. PATIENT-LVL IV: CPT | Mod: PBBFAC,,, | Performed by: THORACIC SURGERY (CARDIOTHORACIC VASCULAR SURGERY)

## 2020-10-02 PROCEDURE — 3008F BODY MASS INDEX DOCD: CPT | Mod: CPTII,S$GLB,, | Performed by: THORACIC SURGERY (CARDIOTHORACIC VASCULAR SURGERY)

## 2020-10-02 PROCEDURE — 3008F PR BODY MASS INDEX (BMI) DOCUMENTED: ICD-10-PCS | Mod: CPTII,S$GLB,, | Performed by: PHYSICIAN ASSISTANT

## 2020-10-02 PROCEDURE — 99215 PR OFFICE/OUTPT VISIT, EST, LEVL V, 40-54 MIN: ICD-10-PCS | Mod: S$GLB,,, | Performed by: PHYSICIAN ASSISTANT

## 2020-10-02 PROCEDURE — 71046 X-RAY EXAM CHEST 2 VIEWS: CPT | Mod: 26,,, | Performed by: RADIOLOGY

## 2020-10-02 PROCEDURE — 3008F BODY MASS INDEX DOCD: CPT | Mod: CPTII,S$GLB,, | Performed by: PHYSICIAN ASSISTANT

## 2020-10-02 PROCEDURE — 99215 OFFICE O/P EST HI 40 MIN: CPT | Mod: S$GLB,,, | Performed by: PHYSICIAN ASSISTANT

## 2020-10-02 PROCEDURE — 99999 PR PBB SHADOW E&M-EST. PATIENT-LVL I: ICD-10-PCS | Mod: PBBFAC,,,

## 2020-10-02 PROCEDURE — 3008F PR BODY MASS INDEX (BMI) DOCUMENTED: ICD-10-PCS | Mod: CPTII,S$GLB,, | Performed by: THORACIC SURGERY (CARDIOTHORACIC VASCULAR SURGERY)

## 2020-10-02 PROCEDURE — 99204 OFFICE O/P NEW MOD 45 MIN: CPT | Mod: S$GLB,,, | Performed by: THORACIC SURGERY (CARDIOTHORACIC VASCULAR SURGERY)

## 2020-10-02 PROCEDURE — 99204 PR OFFICE/OUTPT VISIT, NEW, LEVL IV, 45-59 MIN: ICD-10-PCS | Mod: S$GLB,,, | Performed by: THORACIC SURGERY (CARDIOTHORACIC VASCULAR SURGERY)

## 2020-10-02 NOTE — LETTER
October 2, 2020        Kevin Chaves MD  [address]  [address]  [facility] [address]             [facility]  [address]  [facility] [address]  Phone: [phone]  Fax: [phone]   Patient: Marcello Briggs   MR Number: [id number]   YOB: 1975   Date of Visit: 10/2/2020       Dear Dr. Chaves:    Thank you for referring Marcello Briggs to me for evaluation. Attached you will find relevant portions of my assessment and plan of care.    If you have questions, please do not hesitate to call me. I look forward to following Marcello Briggs along with you.    Sincerely,      Xenia Dutton PA-C            CC  No Recipients    Enclosure

## 2020-10-02 NOTE — PROGRESS NOTES
Thank you for referring your patient Marcello Briggs to the adult congenital electrophysiology clinic for consultation. Please review my findings below.    CHIEF COMPLAINT: F/u for arrhythmias, pacemaker near BLANK, pre-op generator change.    HISTORY OF PRESENT ILLNESS:  Marcello is a 45 y.o. Male with a complex past medical history. Followed by Dr. Acosta in the [facility] clinic.     To summarize, his diagnoses are as follows:  1. 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  2. History of a classic Roger shunt and a 12 mm conduit from the right atrium to the left pulmonary artery along with a Damus Judy Stansel operation - now with trivial native and mild to moderate brigette-aortic insufficiency   3. Subsequent lateral tunnel Fontan operation utilizing a 16 mm ring Goretex graft from the left pulmonary artery to the right pulmonary artery.  4. 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 Last cardioversion several years ago  5. Significantly elevated central venous pressures with chronic desaturation at least partially due to a collateral vessels - improved after catheterization.  6. Varicose veins followed by vascular medicine - improved.  7. No evidence of protein-losing enteropathy.  8. Mild AV valve insuffiency.  9 High ventricular rates on his device s/p negative NIEPS on 4/6/16   10. Pacemaker near BLANK, leads functioning well - scheduled for generator change next week     On July 7, 2010 he underwent a cardiac catheterization:   1. Complex univentricular cardiac defect with superior inferior ventricles with double inlet left ventricle (atrioventricular connection not defined at catheterization), status post Fswfc-Tlpe-Nrimmhv connection and intracardiac lateral tunnel Fontan.   2. Moderate neoaortic (pulmonary) valve insufficiency.   3. Left aorta.   4. 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   5. Elevated central venous pressure (mean 19), transpulmonary gradient 4-5 mmHg.    Marcello was seen in clinic last month. His device is near BLANK.  He is scheduled for replacement next week and returns today for pre-op evaluation.  He is overall doing well.  He continues to have some swelling and cramping in his right leg where he has varicose veins.  He takes lasix PRN for this which helps.  He denies any recent illnesses, fever, cough, congestion, rhinorrhea, diarrhea, nausea, vomiting.  He has no chest pain or difficulty breathing.  He has no syncope or near syncope.  He has good activity.  He has no fatigue by report.       REVIEW OF SYSTEMS:   GENERAL: No fever, chills,or weight loss.    SKIN: No rashes, itching or changes in color or texture of skin.  CHEST: see HPI  CARDIOVASCULAR: Denies chest pain or reduced exercise tolerance.  ABDOMEN: Appetite fine. No weight loss. Denies diarrhea, abdominal pain, or vomiting.  NEUROLOGIC: No history of seizures,  alteration of gait or coordination.  MSK: See HPI    PAST MEDICAL HISTORY:   Past Medical History:   Diagnosis Date    Asthma     Atrial flutter     Cyanosis     Heart murmur     Intra-atrial reentrant tachycardia     MIGUEL (obstructive sleep apnea) 10/23/2014    TGA (transposition of great arteries)     Tricuspid atresia     VSD (ventricular septal defect)        FAMILY HISTORY:   The family history is negative for congenital heart disease and sudden death in individuals less than 40 years old.    SOCIAL HISTORY:   Social History     Socioeconomic History    Marital status: Legally      Spouse name: Not on file    Number of children: Not on file    Years of education: Not on file    Highest education level: Not on file   Occupational History    Not on file   Social Needs    Financial resource strain: Not on file    Food insecurity     Worry: Not on file     Inability: Not on file     Transportation needs     Medical: Not on file     Non-medical: Not on file   Tobacco Use    Smoking status: Never Smoker    Smokeless tobacco: Never Used   Substance and Sexual Activity    Alcohol use: No    Drug use: No    Sexual activity: Not on file   Lifestyle    Physical activity     Days per week: Not on file     Minutes per session: Not on file    Stress: Not on file   Relationships    Social connections     Talks on phone: Not on file     Gets together: Not on file     Attends [employer] service: Not on file     Active member of club or organization: Not on file     Attends meetings of clubs or organizations: Not on file     Relationship status: Not on file   Other Topics Concern    Not on file   Social History Narrative    He is working at [employer].       ALLERGIES:  No Known Allergies    MEDICATIONS:    Current Outpatient Medications:     digoxin (LANOXIN) 250 mcg tablet, Take 1 tablet (0.25 mg total) by mouth every morning., Disp: 90 tablet, Rfl: 3    enalapril (VASOTEC) 10 MG tablet, TAKE 1 TABLET BY MOUTH TWICE A DAY, Disp: 180 tablet, Rfl: 3    metoprolol succinate (TOPROL-XL) 100 MG 24 hr tablet, Take 3 tablets (300 mg total) by mouth once daily., Disp: 270 tablet, Rfl: 3    sildenafil (REVATIO) 20 mg Tab, TAKE 1 TABLET BY MOUTH TWICE A DAY, Disp: 180 tablet, Rfl: 3    sotaloL (BETAPACE) 240 MG Tab, Take 1 tablet (240 mg total) by mouth 2 (two) times daily., Disp: 60 tablet, Rfl: 11    warfarin (COUMADIN) 2 MG tablet, Take 10 mg (6mg + 2 (2mg) tabs every Saturday and 8mg (6mg +2 mg) all other days as directed (Patient taking differently: Take 8 mg daily (6mg + 2mg)), Disp: 35 tablet, Rfl: 11    warfarin (COUMADIN) 6 MG tablet, TAKE 10MG (6MG + 2 (2MG) TABS EVERY SATURDAY AND 8MG (6MG + 2MG) TABS ALL OTHER DAYS AS DIRECTED, Disp: 30 tablet, Rfl: 11    furosemide (LASIX) 20 MG tablet, TAKE 1 TABLET (20 MG TOTAL) BY MOUTH ONCE DAILY., Disp: 60 tablet, Rfl: 6      PHYSICAL EXAM:  "  Vitals:    10/02/20 1020   BP: (!) 130/58   BP Location: Right arm   Patient Position: Sitting   Pulse: 75   SpO2: (!) 92%   Weight: 75.1 kg (165 lb 9.1 oz)   Height: 5' 2.99" (1.6 m)       GENERAL: Awake, well-developed well-nourished, no apparent distress  HEENT: mucous membranes moist and pink, normocephalic atraumatic, no cranial or carotid bruits, sclera anicteric  NECK: no jugular venous distention, no lymphadenopathy  CHEST: Good air movement, clear to auscultation bilaterally.  Pacemaker incision well healed in right upper chest, well healed sternotomy   CARDIOVASCULAR: Quiet precordium, regular rate and rhythm, no rubs or gallops.  A grade 1/6 systolic ejection murmur is auscultated. No diastolic murmurs or gallops are heard.  ABDOMEN: Soft, nontender nondistended, Liver edge palpated 1 cm below costal margin.  EXTREMITIES: Warm well perfused, 2+ radial/pedal pulses, capillary refill 2 seconds, no cyanosis, trace edema RLE  NEURO: Alert and oriented, cooperative with exam, face symmetric, moves all extremities well      STUDIES:  ECG: AV sequential pacing with prolonged AV delay.    Holter monitor:  Predominant rhythm is AV sequential pacing  Rare atrial/ventricular ectopy  20 episodes of slow SVT (longest 9 beats fastest 133 bpm)  One 4 beat episode of slow VT (max rate 156 bpm) and one ventricular triplet  No diary symptoms      Pacemaker Interrogation 9/10/2020:   Following physician: Dereje    Permanent Programming   RA Lead: 2.5 V @ 1.5 ms. Sensitivity: 0.5 mV.   RV Lead: 3 V @ 0.5 ms. Sensitivity: 2 mV.     Pacemaker Generator and Leads meet standard of FDA approval.     Chamber type: dual.   Mode: DDDR   Lower limit rate: 75 bpm   Upper tracking rate: 130 bpm     AV Delay  Longest: 200 msec       PV Delay  Longest: 200 msec   Device Analysis 2  Battery voltage: 2.68 V  BV @ BLANK: 2.60 V  Estimated longevity: 2-3 2.9 months to BLANK .   Magnet Rate: 55.3 ppm      Leads  RA Lead:        P/R-wave: Paced " mV       Lead Impedance: 300 Ohms       Paced: 98%   RV Lead:        P/R-wave: 10  mV       Impedance: 330 Ohms      Thresholds  RA Lead: 0.5 V @ 1.5 ms. Configuration: unipolar.   RV Lead: 1.25 V @ 0.5 ms. Configuration: bipolar.   Device Comments  Wound check comments:   Healed incision     Reprogramming comments:   No changes this session     General comments:   Device interrogation and lead testing performed. Device and leads WNL.  Approximately 2.9 months to BLANK.WIll schedule monthly transmissions.     AMS x2 - available EGM's reveal noise artifact on Atrial lead    Scheduled for REMOTE transmission on Monday, October 12, 2020         ASSESSMENT:  45 y.o. male with TGA and tricuspid atresia s/p Fontan with pacemaker.  He is asymptomatic at present.  His recent holter was reviewed by Dr. Reyez and her impression was that we will not change treatment plan based on these findings.  He is clear to proceed with generator change with Dr. Garcia 10/8/2020.       PLAN:   - Metoprolol 300 mg XL daily  - Sotalol 240 mg po BID  - Continue Dig to 250mcg daily  - He will hold coumadin after tomorrow's (Saturday) dose - to restart post discharge and will send with order to check INR 4 days after restarting coumadin.   - Patient asked to call with palpitations, syncope, worsening fatigue, chest pain, or any other questions or concerns in the interim.  - [facility]   - SBE prophylaxis as per Dr. Acosta        The patient's doctor will be notified via EPIC/FAX    I hope this brings you up-to-date on Marcello Briggs  Please contact me with any questions or concerns.    Xenia Dutton PA-C  Pediatric Cardiology   Pediatric Electrophysiology

## 2020-10-02 NOTE — PROGRESS NOTES
Marcello here today for pre op consult for pacemaker generator replacement on 10/8/2020.  Marcello denies any recent illness, no fever, no NVD, no cold symptoms, no cough.  Pre op instructions provided--no food or drink after 12 midnight night before surgery and check in for 6 am on 2nd floor of hospital, [facility]. Explained to take last dose of coumadin on 10/3/20 and then stop in preparation for surgery.  Completed teach back.  Scheduled COVID 19 screening for 10/5/20.

## 2020-10-02 NOTE — TELEPHONE ENCOUNTER
Attempted to contact Mr Armendariz as has not checked in for pre op appointments which started at 845, no answer left message.

## 2020-10-02 NOTE — LETTER
October 2, 2020      Jaz Reyez MD  [address]  [facility] [address]           [facility]  [address]  [facility] [address]  Phone: [phone]  Fax: [phone]          Patient: Marcello Briggs   MR Number: [id number]   YOB: 1975   Date of Visit: 10/2/2020       Dear Dr. Jaz Reyez:    Thank you for referring Marcello Briggs to me for evaluation. Attached you will find relevant portions of my assessment and plan of care.    If you have questions, please do not hesitate to call me. I look forward to following Marcello Briggs along with you.    Sincerely,    Chang Garcia MD    Enclosure  CC:  No Recipients    If you would like to receive this communication electronically, please contact [email or web address] or [phone] to request more information on [facility] access.    For providers and/or their staff who would like to refer a patient to [facility], please contact us through our one-stop-shop provider referral line, [facility], [place], at [phone].    If you feel you have received this communication in error or would no longer like to receive these types of communications, please e-mail [email or web address]

## 2020-10-02 NOTE — H&P (VIEW-ONLY)
Subjective:      Patient: Marcello Briggs, MRN: [id number]  Requesting Physician:  Dr. Jaz UNGER*     Chief Complaint   Patient presents with    Heart Problem     s/p Pacemaker now at BLANK, h/o TGA, tricuspid atresia, s/p Fontan       Surgical CONSULT/EVALUATION: Patient presents for surgical consultation    Diagnosis:      ICD-10-CM ICD-9-CM   1. Pacemaker  Z95.0 V45.01   2. Atrial flutter, unspecified type  I48.92 427.32   3. 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 VSD (ventricular septal defect and aortic arch hypoplasia  Q21.0 745.4    Q25.42 747.21   5. S/P Fontan procedure  Z98.890 V45.89   6. Long term current use of anticoagulant therapy  Z79.01 V58.61       HPI:   Marcello is a 45 y.o. Male with a complex past medical history. He is followed by Dr. Acosta in the [facility] clinic.      To summarize, his diagnoses are as follows:  1. 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  2. History of a classic Roger shunt and a 12 mm conduit from the right atrium to the left pulmonary artery along with a Damus Judy Stansel operation - now with trivial native and mild to moderate brigette-aortic insufficiency   3. Subsequent lateral tunnel Fontan operation utilizing a 16 mm ring Goretex graft from the left pulmonary artery to the right pulmonary artery.  4. 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 Last cardioversion several years ago  5. Significantly elevated central venous pressures with chronic desaturation at least partially due to a collateral vessels - improved after catheterization.  6. Varicose veins followed by vascular medicine - improved.  7. No evidence of protein-losing enteropathy.  8. Mild AV valve insuffiency.  9 High ventricular rates on his device s/p negative NIEPS on 4/6/16   10. Device near BLANK -  scheduled for generator change next week     On July 7, 2010 he underwent a cardiac catheterization:   1. Complex univentricular cardiac defect with superior inferior ventricles with double inlet left ventricle (atrioventricular connection not defined at catheterization), status post Rivnf-Mvgc-Bntgnwa connection and intracardiac lateral tunnel Fontan.   2. Moderate neoaortic (pulmonary) valve insufficiency.   3. Left aorta.   4. 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   5. Elevated central venous pressure (mean 19), transpulmonary gradient 4-5 mmHg.     Marcello was seen in clinic last month. His device is near BLANK.  He is scheduled for replacement next week and returns today for pre-op evaluation.  He is overall doing well.  He has been having some swelling and cramping in his right leg where he has varicose veins.  He takes lasix PRN for this which helps. He does not wear compression socks. He denies any recent illnesses, fever, cough, congestion, rhinorrhea, diarrhea, nausea, vomiting.  He has no chest pain or difficulty breathing.  He has no syncope or near syncope.  He has good activity.  He has no fatigue by report.     He is on coumadin but does not check his INR.     ROS  GENERAL: No fever, chills,or weight loss.    SKIN: No rashes, itching or changes in color or texture of skin.  CHEST: see HPI  CARDIOVASCULAR: Denies chest pain or reduced exercise tolerance.  ABDOMEN: Appetite fine. No weight loss. Denies diarrhea, abdominal pain, or vomiting.  NEUROLOGIC: No history of seizures,  alteration of gait or coordination.    History:    Past Medical History:   Diagnosis Date    Asthma     Atrial flutter     Cyanosis     Heart murmur     Intra-atrial reentrant tachycardia     MIGUEL (obstructive sleep apnea) 10/23/2014    TGA (transposition of great arteries)     Tricuspid atresia     VSD (ventricular septal defect)        Past Surgical History:   Procedure Laterality Date     CARDIAC CATHETERIZATION      FONTAN PROCEDURE, EXTRACARDIAC         Family History   Problem Relation Age of Onset    No Known Problems Mother     No Known Problems Father     No Known Problems Sister     No Known Problems Brother     No Known Problems Maternal Grandmother     Heart disease Maternal Grandfather     No Known Problems Paternal Grandmother     Diabetes Paternal Grandfather     No Known Problems Maternal Aunt     No Known Problems Maternal Uncle     Diabetes Paternal Aunt     No Known Problems Paternal Uncle     Atrial Septal Defect Neg Hx     Anemia Neg Hx     Arrhythmia Neg Hx     Asthma Neg Hx     Clotting disorder Neg Hx     Fainting Neg Hx     Heart attack Neg Hx     Heart failure Neg Hx     Hyperlipidemia Neg Hx     Hypertension Neg Hx     Stroke Neg Hx        Social History     Socioeconomic History    Marital status: Legally      Spouse name: Not on file    Number of children: Not on file    Years of education: Not on file    Highest education level: Not on file   Occupational History    Not on file   Social Needs    Financial resource strain: Not on file    Food insecurity     Worry: Not on file     Inability: Not on file    Transportation needs     Medical: Not on file     Non-medical: Not on file   Tobacco Use    Smoking status: Never Smoker    Smokeless tobacco: Never Used   Substance and Sexual Activity    Alcohol use: No    Drug use: No    Sexual activity: Not on file   Lifestyle    Physical activity     Days per week: Not on file     Minutes per session: Not on file    Stress: Not on file   Relationships    Social connections     Talks on phone: Not on file     Gets together: Not on file     Attends Mandaeism service: Not on file     Active member of club or organization: Not on file     Attends meetings of clubs or organizations: Not on file     Relationship status: Not on file   Other Topics Concern    Not on file   Social History  "Narrative    He is working at [employer].         Objective:      Physical Exam    /61 (BP Location: Left leg, Patient Position: Lying, BP Method: Medium (Automatic))   Pulse 75   Ht 5' 2.99" (1.6 m)   Wt 75.1 kg (165 lb 10.8 oz)   SpO2 (!) 92%   BMI 29.36 kg/m²        GENERAL: Awake, well-developed well-nourished, no apparent distress  HEENT: mucous membranes moist and pink, normocephalic atraumatic, no cranial or carotid bruits, sclera anicteric  NECK: no jugular venous distention, no lymphadenopathy  CHEST: Good air movement, clear to auscultation bilaterally.  Pacemaker incision well healed in right upper chest, well healed sternotomy   CARDIOVASCULAR:  regular rate and rhythm, no rubs or gallops.  I/VI ABILIO is auscultated. No diastolic murmurs or gallops are heard. Prominent P2  ABDOMEN: Soft, nontender nondistended, Liver edge palpated 1 cm below costal margin.  EXTREMITIES: Warm well perfused, 2+ radial/pedal pulses, capillary refill 2 seconds, no cyanosis, trace edema RLE  NEURO: Alert and oriented, cooperative with exam, face symmetric, moves all extremities well          Studies:  STUDIES:  ECG: AV sequential pacing with prolonged AV delay.     Holter monitor:  Predominant rhythm is AV sequential pacing  Rare atrial/ventricular ectopy  20 episodes of slow SVT (longest 9 beats fastest 133 bpm)  One 4 beat episode of slow VT (max rate 156 bpm) and one ventricular triplet  No diary symptoms     Pacemaker Interrogation 9/10/2020:   Following physician: Dereje    Permanent Programming   RA Lead: 2.5 V @ 1.5 ms. Sensitivity: 0.5 mV.   RV Lead: 3 V @ 0.5 ms. Sensitivity: 2 mV.     Pacemaker Generator and Leads meet standard of FDA approval.     Chamber type: dual.   Mode: DDDR   Lower limit rate: 75 bpm   Upper tracking rate: 130 bpm     AV Delay  Longest: 200 msec       PV Delay  Longest: 200 msec   Device Analysis 2  Battery voltage: 2.68 V  BV @ BLANK: 2.60 V  Estimated longevity: 2-3 2.9 months to " BLANK .   Magnet Rate: 55.3 ppm      Leads  RA Lead:        P/R-wave: Paced mV       Lead Impedance: 300 Ohms       Paced: 98%   RV Lead:        P/R-wave: 10  mV       Impedance: 330 Ohms      Thresholds  RA Lead: 0.5 V @ 1.5 ms. Configuration: unipolar.   RV Lead: 1.25 V @ 0.5 ms. Configuration: bipolar.   Device Comments  Wound check comments:   Healed incision     Reprogramming comments:   No changes this session     General comments:   Device interrogation and lead testing performed. Device and leads WNL.  Approximately 2.9 months to BLANK.WIll schedule monthly transmissions.     AMS x2 - available EGM's reveal noise artifact on Atrial lead         All physician notes and studies have been reviewed in detail.    Assessment & Plan:     Marcello Briggs is a 45 year old with:    1. 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  2. History of a classic Roger shunt and a 12 mm conduit from the right atrium to the left pulmonary artery along with a Damus Judy Stansel operation - now with trivial native and mild to moderate brigette-aortic insufficiency   3. Subsequent lateral tunnel Fontan operation utilizing a 16 mm ring Goretex graft from the left pulmonary artery to the right pulmonary artery.  4. 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 Last cardioversion several years ago  5. Significantly elevated central venous pressures with chronic desaturation at least partially due to a collateral vessels - improved after catheterization.  6. Varicose veins followed by vascular medicine - improved.  7. No evidence of protein-losing enteropathy.  8. Mild AV valve insuffiency.  9 High ventricular rates on his device s/p negative NIEPS on 4/6/16   10. Device near BLANK - scheduled for generator change next week       His device is near BLANK. He would benefit from a generator change at this time.  The risks, benefits, and alternatives to generator replacement were discussed in detail with the patient. There is a risk of bleeding, infection, and the risk of anesthesia. We discussed that Marcello should be able to go home the same day after recovery. He will hold his coumadin after tomorrow's dose. He will follow up for a wound check and device check two weeks post discharge. He underwent pre-operative labs and CXR today. These results will be reviewed when available.  All questions and concerns were addressed.

## 2020-10-02 NOTE — PROGRESS NOTES
Subjective:      Patient: Marcello Briggs, MRN: [id number]  Requesting Physician:  Dr. Jaz UNGER*     Chief Complaint   Patient presents with    Heart Problem     s/p Pacemaker now at BLANK, h/o TGA, tricuspid atresia, s/p Fontan       Surgical CONSULT/EVALUATION: Patient presents for surgical consultation    Diagnosis:      ICD-10-CM ICD-9-CM   1. Pacemaker  Z95.0 V45.01   2. Atrial flutter, unspecified type  I48.92 427.32   3. 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 VSD (ventricular septal defect and aortic arch hypoplasia  Q21.0 745.4    Q25.42 747.21   5. S/P Fontan procedure  Z98.890 V45.89   6. Long term current use of anticoagulant therapy  Z79.01 V58.61       HPI:   Marcello is a 45 y.o. Male with a complex past medical history. He is followed by Dr. Acosta in the [facility] clinic.      To summarize, his diagnoses are as follows:  1. 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  2. History of a classic Roger shunt and a 12 mm conduit from the right atrium to the left pulmonary artery along with a Damus Judy Stansel operation - now with trivial native and mild to moderate brigette-aortic insufficiency   3. Subsequent lateral tunnel Fontan operation utilizing a 16 mm ring Goretex graft from the left pulmonary artery to the right pulmonary artery.  4. 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 Last cardioversion several years ago  5. Significantly elevated central venous pressures with chronic desaturation at least partially due to a collateral vessels - improved after catheterization.  6. Varicose veins followed by vascular medicine - improved.  7. No evidence of protein-losing enteropathy.  8. Mild AV valve insuffiency.  9 High ventricular rates on his device s/p negative NIEPS on 4/6/16   10. Device near BLANK -  scheduled for generator change next week     On July 7, 2010 he underwent a cardiac catheterization:   1. Complex univentricular cardiac defect with superior inferior ventricles with double inlet left ventricle (atrioventricular connection not defined at catheterization), status post [place]hs-Zxtp-[place] connection and intracardiac lateral tunnel Fontan.   2. Moderate neoaortic (pulmonary) valve insufficiency.   3. Left aorta.   4. 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   5. Elevated central venous pressure (mean 19), transpulmonary gradient 4-5 mmHg.     Marcello was seen in clinic last month. His device is near BLANK.  He is scheduled for replacement next week and returns today for pre-op evaluation.  He is overall doing well.  He has been having some swelling and cramping in his right leg where he has varicose veins.  He takes lasix PRN for this which helps. He does not wear compression socks. He denies any recent illnesses, fever, cough, congestion, rhinorrhea, diarrhea, nausea, vomiting.  He has no chest pain or difficulty breathing.  He has no syncope or near syncope.  He has good activity.  He has no fatigue by report.     He is on coumadin but does not check his INR.     ROS  GENERAL: No fever, chills,or weight loss.    SKIN: No rashes, itching or changes in color or texture of skin.  CHEST: see HPI  CARDIOVASCULAR: Denies chest pain or reduced exercise tolerance.  ABDOMEN: Appetite fine. No weight loss. Denies diarrhea, abdominal pain, or vomiting.  NEUROLOGIC: No history of seizures,  alteration of gait or coordination.    History:    Past Medical History:   Diagnosis Date    Asthma     Atrial flutter     Cyanosis     Heart murmur     Intra-atrial reentrant tachycardia     MIGUEL (obstructive sleep apnea) 10/23/2014    TGA (transposition of great arteries)     Tricuspid atresia     VSD (ventricular septal defect)        Past Surgical History:   Procedure Laterality Date     CARDIAC CATHETERIZATION      FONTAN PROCEDURE, EXTRACARDIAC         Family History   Problem Relation Age of Onset    No Known Problems Mother     No Known Problems Father     No Known Problems Sister     No Known Problems Brother     No Known Problems Maternal Grandmother     Heart disease Maternal Grandfather     No Known Problems Paternal Grandmother     Diabetes Paternal Grandfather     No Known Problems Maternal Aunt     No Known Problems Maternal Uncle     Diabetes Paternal Aunt     No Known Problems Paternal Uncle     Atrial Septal Defect Neg Hx     Anemia Neg Hx     Arrhythmia Neg Hx     Asthma Neg Hx     Clotting disorder Neg Hx     Fainting Neg Hx     Heart attack Neg Hx     Heart failure Neg Hx     Hyperlipidemia Neg Hx     Hypertension Neg Hx     Stroke Neg Hx        Social History     Socioeconomic History    Marital status: Legally      Spouse name: Not on file    Number of children: Not on file    Years of education: Not on file    Highest education level: Not on file   Occupational History    Not on file   Social Needs    Financial resource strain: Not on file    Food insecurity     Worry: Not on file     Inability: Not on file    Transportation needs     Medical: Not on file     Non-medical: Not on file   Tobacco Use    Smoking status: Never Smoker    Smokeless tobacco: Never Used   Substance and Sexual Activity    Alcohol use: No    Drug use: No    Sexual activity: Not on file   Lifestyle    Physical activity     Days per week: Not on file     Minutes per session: Not on file    Stress: Not on file   Relationships    Social connections     Talks on phone: Not on file     Gets together: Not on file     Attends Christianity service: Not on file     Active member of club or organization: Not on file     Attends meetings of clubs or organizations: Not on file     Relationship status: Not on file   Other Topics Concern    Not on file   Social History  "Narrative    He is working at [employer].         Objective:      Physical Exam    /61 (BP Location: Left leg, Patient Position: Lying, BP Method: Medium (Automatic))   Pulse 75   Ht 5' 2.99" (1.6 m)   Wt 75.1 kg (165 lb 10.8 oz)   SpO2 (!) 92%   BMI 29.36 kg/m²        GENERAL: Awake, well-developed well-nourished, no apparent distress  HEENT: mucous membranes moist and pink, normocephalic atraumatic, no cranial or carotid bruits, sclera anicteric  NECK: no jugular venous distention, no lymphadenopathy  CHEST: Good air movement, clear to auscultation bilaterally.  Pacemaker incision well healed in right upper chest, well healed sternotomy   CARDIOVASCULAR:  regular rate and rhythm, no rubs or gallops.  I/VI ABILIO is auscultated. No diastolic murmurs or gallops are heard. Prominent P2  ABDOMEN: Soft, nontender nondistended, Liver edge palpated 1 cm below costal margin.  EXTREMITIES: Warm well perfused, 2+ radial/pedal pulses, capillary refill 2 seconds, no cyanosis, trace edema RLE  NEURO: Alert and oriented, cooperative with exam, face symmetric, moves all extremities well          Studies:  STUDIES:  ECG: AV sequential pacing with prolonged AV delay.     Holter monitor:  Predominant rhythm is AV sequential pacing  Rare atrial/ventricular ectopy  20 episodes of slow SVT (longest 9 beats fastest 133 bpm)  One 4 beat episode of slow VT (max rate 156 bpm) and one ventricular triplet  No diary symptoms     Pacemaker Interrogation 9/10/2020:   Following physician: Dereje    Permanent Programming   RA Lead: 2.5 V @ 1.5 ms. Sensitivity: 0.5 mV.   RV Lead: 3 V @ 0.5 ms. Sensitivity: 2 mV.     Pacemaker Generator and Leads meet standard of FDA approval.     Chamber type: dual.   Mode: DDDR   Lower limit rate: 75 bpm   Upper tracking rate: 130 bpm     AV Delay  Longest: 200 msec       PV Delay  Longest: 200 msec   Device Analysis 2  Battery voltage: 2.68 V  BV @ BLANK: 2.60 V  Estimated longevity: 2-3 2.9 months to " BLANK .   Magnet Rate: 55.3 ppm      Leads  RA Lead:        P/R-wave: Paced mV       Lead Impedance: 300 Ohms       Paced: 98%   RV Lead:        P/R-wave: 10  mV       Impedance: 330 Ohms      Thresholds  RA Lead: 0.5 V @ 1.5 ms. Configuration: unipolar.   RV Lead: 1.25 V @ 0.5 ms. Configuration: bipolar.   Device Comments  Wound check comments:   Healed incision     Reprogramming comments:   No changes this session     General comments:   Device interrogation and lead testing performed. Device and leads WNL.  Approximately 2.9 months to BLANK.WIll schedule monthly transmissions.     AMS x2 - available EGM's reveal noise artifact on Atrial lead         All physician notes and studies have been reviewed in detail.    Assessment & Plan:     Marcello Briggs is a 45 year old with:    1. 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  2. History of a classic Roger shunt and a 12 mm conduit from the right atrium to the left pulmonary artery along with a Damus Judy Stansel operation - now with trivial native and mild to moderate brigette-aortic insufficiency   3. Subsequent lateral tunnel Fontan operation utilizing a 16 mm ring Goretex graft from the left pulmonary artery to the right pulmonary artery.  4. 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 Last cardioversion several years ago  5. Significantly elevated central venous pressures with chronic desaturation at least partially due to a collateral vessels - improved after catheterization.  6. Varicose veins followed by vascular medicine - improved.  7. No evidence of protein-losing enteropathy.  8. Mild AV valve insuffiency.  9 High ventricular rates on his device s/p negative NIEPS on 4/6/16   10. Device near BLANK - scheduled for generator change next week       His device is near BLANK. He would benefit from a generator change at this time.  The risks, benefits, and alternatives to generator replacement were discussed in detail with the patient. There is a risk of bleeding, infection, and the risk of anesthesia. We discussed that Marcello should be able to go home the same day after recovery. He will hold his coumadin after tomorrow's dose. He will follow up for a wound check and device check two weeks post discharge. He underwent pre-operative labs and CXR today. These results will be reviewed when available.  All questions and concerns were addressed.

## 2020-10-05 ENCOUNTER — LAB VISIT (OUTPATIENT)
Dept: URGENT CARE | Facility: CLINIC | Age: 45
End: 2020-10-05
Payer: COMMERCIAL

## 2020-10-05 VITALS — TEMPERATURE: 99 F | OXYGEN SATURATION: 89 % | HEART RATE: 76 BPM

## 2020-10-05 DIAGNOSIS — Z95.0 PACEMAKER: ICD-10-CM

## 2020-10-05 DIAGNOSIS — Z79.01 LONG TERM CURRENT USE OF ANTICOAGULANT THERAPY: ICD-10-CM

## 2020-10-05 DIAGNOSIS — Z98.890 S/P FONTAN PROCEDURE: ICD-10-CM

## 2020-10-05 DIAGNOSIS — Q25.42 VSD (VENTRICULAR SEPTAL DEFECT AND AORTIC ARCH HYPOPLASIA: ICD-10-CM

## 2020-10-05 DIAGNOSIS — Q20.3 TRICUSPID ATRESIA WITH D-TRANSPOSITION OF GREAT ARTERIES: ICD-10-CM

## 2020-10-05 DIAGNOSIS — I48.92 ATRIAL FLUTTER, UNSPECIFIED TYPE: ICD-10-CM

## 2020-10-05 DIAGNOSIS — Q21.0 VSD (VENTRICULAR SEPTAL DEFECT AND AORTIC ARCH HYPOPLASIA: ICD-10-CM

## 2020-10-05 DIAGNOSIS — Q22.4 TRICUSPID ATRESIA WITH D-TRANSPOSITION OF GREAT ARTERIES: ICD-10-CM

## 2020-10-05 PROCEDURE — U0003 INFECTIOUS AGENT DETECTION BY NUCLEIC ACID (DNA OR RNA); SEVERE ACUTE RESPIRATORY SYNDROME CORONAVIRUS 2 (SARS-COV-2) (CORONAVIRUS DISEASE [COVID-19]), AMPLIFIED PROBE TECHNIQUE, MAKING USE OF HIGH THROUGHPUT TECHNOLOGIES AS DESCRIBED BY CMS-2020-01-R: HCPCS

## 2020-10-05 NOTE — PROGRESS NOTES
Pt presents to clinic for pre-procedural covid testing with orders.   Pt explained that oxygen sats are normal at 89% he runs in range from 85-89% due to tricuspid artesia.

## 2020-10-06 LAB — SARS-COV-2 RNA RESP QL NAA+PROBE: NOT DETECTED

## 2020-10-07 ENCOUNTER — TELEPHONE (OUTPATIENT)
Dept: VASCULAR SURGERY | Facility: CLINIC | Age: 45
End: 2020-10-07

## 2020-10-07 ENCOUNTER — TELEPHONE (OUTPATIENT)
Dept: URGENT CARE | Facility: CLINIC | Age: 45
End: 2020-10-07

## 2020-10-07 ENCOUNTER — ANESTHESIA EVENT (OUTPATIENT)
Dept: SURGERY | Facility: HOSPITAL | Age: 45
End: 2020-10-07
Payer: COMMERCIAL

## 2020-10-07 RX ORDER — CEFAZOLIN SODIUM 1 G/3ML
2 INJECTION, POWDER, FOR SOLUTION INTRAMUSCULAR; INTRAVENOUS ONCE
Status: COMPLETED | OUTPATIENT
Start: 2020-10-08 | End: 2020-10-08

## 2020-10-07 NOTE — TELEPHONE ENCOUNTER
Attempted to contact Marcello, no answer, left detailed message--no food or drink after 12 midnight tonight and check in for 6 am on 2nd floor of [facility].

## 2020-10-08 ENCOUNTER — ANESTHESIA (OUTPATIENT)
Dept: SURGERY | Facility: HOSPITAL | Age: 45
End: 2020-10-08
Payer: COMMERCIAL

## 2020-10-08 ENCOUNTER — HOSPITAL ENCOUNTER (OUTPATIENT)
Facility: HOSPITAL | Age: 45
Discharge: HOME | End: 2020-10-08
Attending: THORACIC SURGERY (CARDIOTHORACIC VASCULAR SURGERY) | Admitting: THORACIC SURGERY (CARDIOTHORACIC VASCULAR SURGERY)
Payer: COMMERCIAL

## 2020-10-08 VITALS
OXYGEN SATURATION: 89 % | HEART RATE: 76 BPM | TEMPERATURE: 98 F | BODY MASS INDEX: 29.34 KG/M2 | SYSTOLIC BLOOD PRESSURE: 115 MMHG | RESPIRATION RATE: 12 BRPM | DIASTOLIC BLOOD PRESSURE: 55 MMHG | HEIGHT: 63 IN | WEIGHT: 165.56 LBS

## 2020-10-08 DIAGNOSIS — Z45.010 PACEMAKER AT END OF BATTERY LIFE: Primary | ICD-10-CM

## 2020-10-08 LAB
INR PPP: 1.6 (ref 0.8–1.2)
PROTHROMBIN TIME: 17.7 SEC (ref 9–12.5)

## 2020-10-08 PROCEDURE — 25000003 PHARM REV CODE 250: Performed by: THORACIC SURGERY (CARDIOTHORACIC VASCULAR SURGERY)

## 2020-10-08 PROCEDURE — 63600175 PHARM REV CODE 636 W HCPCS: Performed by: NURSE ANESTHETIST, CERTIFIED REGISTERED

## 2020-10-08 PROCEDURE — 88300 SURGICAL PATH GROSS: CPT | Performed by: PATHOLOGY

## 2020-10-08 PROCEDURE — 37000009 HC ANESTHESIA EA ADD 15 MINS: Performed by: THORACIC SURGERY (CARDIOTHORACIC VASCULAR SURGERY)

## 2020-10-08 PROCEDURE — 63600175 PHARM REV CODE 636 W HCPCS: Performed by: PHYSICIAN ASSISTANT

## 2020-10-08 PROCEDURE — D9220A PRA ANESTHESIA: Mod: ANES,,, | Performed by: ANESTHESIOLOGY

## 2020-10-08 PROCEDURE — 88300 PR  SURG PATH,GROSS,LEVEL I: ICD-10-PCS | Mod: 26,,, | Performed by: PATHOLOGY

## 2020-10-08 PROCEDURE — D9220A PRA ANESTHESIA: ICD-10-PCS | Mod: CRNA,,, | Performed by: NURSE ANESTHETIST, CERTIFIED REGISTERED

## 2020-10-08 PROCEDURE — D9220A PRA ANESTHESIA: ICD-10-PCS | Mod: ANES,,, | Performed by: ANESTHESIOLOGY

## 2020-10-08 PROCEDURE — 33228 REMV&REPLC PM GEN DUAL LEAD: CPT | Mod: ,,, | Performed by: THORACIC SURGERY (CARDIOTHORACIC VASCULAR SURGERY)

## 2020-10-08 PROCEDURE — 86920 COMPATIBILITY TEST SPIN: CPT

## 2020-10-08 PROCEDURE — 71000015 HC POSTOP RECOV 1ST HR: Performed by: THORACIC SURGERY (CARDIOTHORACIC VASCULAR SURGERY)

## 2020-10-08 PROCEDURE — 37000008 HC ANESTHESIA 1ST 15 MINUTES: Performed by: THORACIC SURGERY (CARDIOTHORACIC VASCULAR SURGERY)

## 2020-10-08 PROCEDURE — 36000707: Performed by: THORACIC SURGERY (CARDIOTHORACIC VASCULAR SURGERY)

## 2020-10-08 PROCEDURE — 63600175 PHARM REV CODE 636 W HCPCS: Performed by: THORACIC SURGERY (CARDIOTHORACIC VASCULAR SURGERY)

## 2020-10-08 PROCEDURE — 88300 SURGICAL PATH GROSS: CPT | Mod: 26,,, | Performed by: PATHOLOGY

## 2020-10-08 PROCEDURE — 85610 PROTHROMBIN TIME: CPT

## 2020-10-08 PROCEDURE — C1785 PMKR, DUAL, RATE-RESP: HCPCS

## 2020-10-08 PROCEDURE — D9220A PRA ANESTHESIA: Mod: CRNA,,, | Performed by: NURSE ANESTHETIST, CERTIFIED REGISTERED

## 2020-10-08 PROCEDURE — 71000044 HC DOSC ROUTINE RECOVERY FIRST HOUR: Performed by: THORACIC SURGERY (CARDIOTHORACIC VASCULAR SURGERY)

## 2020-10-08 PROCEDURE — 99499 UNLISTED E&M SERVICE: CPT | Mod: ,,, | Performed by: PHYSICIAN ASSISTANT

## 2020-10-08 PROCEDURE — 99499 NO LOS: ICD-10-PCS | Mod: ,,, | Performed by: PHYSICIAN ASSISTANT

## 2020-10-08 PROCEDURE — 36000706: Performed by: THORACIC SURGERY (CARDIOTHORACIC VASCULAR SURGERY)

## 2020-10-08 PROCEDURE — 33228 PR REMV&REPLC PM GEN DUAL LEAD: ICD-10-PCS | Mod: ,,, | Performed by: THORACIC SURGERY (CARDIOTHORACIC VASCULAR SURGERY)

## 2020-10-08 PROCEDURE — 25000003 PHARM REV CODE 250: Performed by: NURSE ANESTHETIST, CERTIFIED REGISTERED

## 2020-10-08 DEVICE — IMPLANTABLE DEVICE: Type: IMPLANTABLE DEVICE | Site: CHEST | Status: FUNCTIONAL

## 2020-10-08 RX ORDER — BUPIVACAINE HYDROCHLORIDE 2.5 MG/ML
INJECTION, SOLUTION EPIDURAL; INFILTRATION; INTRACAUDAL
Status: DISCONTINUED | OUTPATIENT
Start: 2020-10-08 | End: 2020-10-08 | Stop reason: HOSPADM

## 2020-10-08 RX ORDER — PROPOFOL 10 MG/ML
VIAL (ML) INTRAVENOUS CONTINUOUS PRN
Status: DISCONTINUED | OUTPATIENT
Start: 2020-10-08 | End: 2020-10-08

## 2020-10-08 RX ORDER — ETOMIDATE 2 MG/ML
INJECTION INTRAVENOUS
Status: DISCONTINUED | OUTPATIENT
Start: 2020-10-08 | End: 2020-10-08

## 2020-10-08 RX ORDER — MIDAZOLAM HYDROCHLORIDE 1 MG/ML
INJECTION, SOLUTION INTRAMUSCULAR; INTRAVENOUS
Status: DISCONTINUED | OUTPATIENT
Start: 2020-10-08 | End: 2020-10-08

## 2020-10-08 RX ORDER — VANCOMYCIN HYDROCHLORIDE 1 G/20ML
INJECTION, POWDER, LYOPHILIZED, FOR SOLUTION INTRAVENOUS
Status: DISCONTINUED | OUTPATIENT
Start: 2020-10-08 | End: 2020-10-08 | Stop reason: HOSPADM

## 2020-10-08 RX ORDER — CEPHALEXIN 500 MG/1
500 CAPSULE ORAL EVERY 8 HOURS
Qty: 15 CAPSULE | Refills: 0 | Status: SHIPPED | OUTPATIENT
Start: 2020-10-08 | End: 2020-10-13

## 2020-10-08 RX ORDER — SODIUM CHLORIDE 0.9 % (FLUSH) 0.9 %
10 SYRINGE (ML) INJECTION
Status: DISCONTINUED | OUTPATIENT
Start: 2020-10-08 | End: 2020-10-08 | Stop reason: HOSPADM

## 2020-10-08 RX ORDER — FENTANYL CITRATE 50 UG/ML
25 INJECTION, SOLUTION INTRAMUSCULAR; INTRAVENOUS EVERY 5 MIN PRN
Status: DISCONTINUED | OUTPATIENT
Start: 2020-10-08 | End: 2020-10-08 | Stop reason: HOSPADM

## 2020-10-08 RX ORDER — FENTANYL CITRATE 50 UG/ML
INJECTION, SOLUTION INTRAMUSCULAR; INTRAVENOUS
Status: DISCONTINUED | OUTPATIENT
Start: 2020-10-08 | End: 2020-10-08

## 2020-10-08 RX ORDER — PHENYLEPHRINE HYDROCHLORIDE 10 MG/ML
INJECTION INTRAVENOUS
Status: DISCONTINUED | OUTPATIENT
Start: 2020-10-08 | End: 2020-10-08

## 2020-10-08 RX ORDER — LIDOCAINE HYDROCHLORIDE 20 MG/ML
INJECTION INTRAVENOUS
Status: DISCONTINUED | OUTPATIENT
Start: 2020-10-08 | End: 2020-10-08

## 2020-10-08 RX ORDER — SODIUM CHLORIDE 9 MG/ML
INJECTION, SOLUTION INTRAVENOUS CONTINUOUS PRN
Status: DISCONTINUED | OUTPATIENT
Start: 2020-10-08 | End: 2020-10-08

## 2020-10-08 RX ORDER — PROPOFOL 10 MG/ML
VIAL (ML) INTRAVENOUS
Status: DISCONTINUED | OUTPATIENT
Start: 2020-10-08 | End: 2020-10-08

## 2020-10-08 RX ORDER — HYDROMORPHONE HYDROCHLORIDE 1 MG/ML
0.2 INJECTION, SOLUTION INTRAMUSCULAR; INTRAVENOUS; SUBCUTANEOUS EVERY 5 MIN PRN
Status: DISCONTINUED | OUTPATIENT
Start: 2020-10-08 | End: 2020-10-08 | Stop reason: HOSPADM

## 2020-10-08 RX ADMIN — PROPOFOL 50 MCG/KG/MIN: 10 INJECTION, EMULSION INTRAVENOUS at 07:10

## 2020-10-08 RX ADMIN — MIDAZOLAM HYDROCHLORIDE 2 MG: 1 INJECTION, SOLUTION INTRAMUSCULAR; INTRAVENOUS at 07:10

## 2020-10-08 RX ADMIN — FENTANYL CITRATE 25 MCG: 50 INJECTION, SOLUTION INTRAMUSCULAR; INTRAVENOUS at 07:10

## 2020-10-08 RX ADMIN — PROPOFOL 20 MG: 10 INJECTION, EMULSION INTRAVENOUS at 07:10

## 2020-10-08 RX ADMIN — CEFAZOLIN 2 G: 330 INJECTION, POWDER, FOR SOLUTION INTRAMUSCULAR; INTRAVENOUS at 07:10

## 2020-10-08 RX ADMIN — FENTANYL CITRATE 25 MCG: 50 INJECTION, SOLUTION INTRAMUSCULAR; INTRAVENOUS at 08:10

## 2020-10-08 RX ADMIN — PHENYLEPHRINE HYDROCHLORIDE 100 MCG: 10 INJECTION INTRAVENOUS at 08:10

## 2020-10-08 RX ADMIN — LIDOCAINE HYDROCHLORIDE 40 MG: 20 INJECTION, SOLUTION INTRAVENOUS at 07:10

## 2020-10-08 RX ADMIN — ETOMIDATE 2 MG: 2 INJECTION, SOLUTION INTRAVENOUS at 07:10

## 2020-10-08 RX ADMIN — SODIUM CHLORIDE: 0.9 INJECTION, SOLUTION INTRAVENOUS at 07:10

## 2020-10-08 RX ADMIN — ETOMIDATE 4 MG: 2 INJECTION, SOLUTION INTRAVENOUS at 07:10

## 2020-10-08 NOTE — OP NOTE
[facility] [place], [place]    OPERATIVE NOTE        Patient Name: Marcello Briggs      Unit Number: [id number]  Date of Operation: 10/8/2020       Diagnoses: DDD Pacemaker at BLANK (ICD-9  426.0)  Procedure: Replacement of pacemaker generator (CPT 80543)  Surgeon: Dr. Chang Garcia  Assistants: Ester Dela Cruz PAC  Anesthesia: IV Sedation by Dr. Contreras, and Local Anesthesia   EBL: minimal    DESCRIPTION OF PROCEDURE:     The patient was placed on the operating table in the supine position an after an adequate level of IV sedation anesthesia had been obtained, venous access was secured.  The chest was prepped and draped in a sterile fashion. His pacemaker was in his right upper chest area. The previous incision for the pacemaker generator was opened and carried down to the pocket.  The generator was removed and the leads disconnected.  The leads had been previously tested and found to still have good sensing and pacing capabilities.  The new generator ([employer] S#[id number]) was connected and placed back within the pocket.  The wound was checked for hemostasis, irrigated with antibiotic solution and then closed with multiple layers of absorbable suture in the usual fashion.  The patient was awakened from sedation and taken to the recovery room in satisfactory condition having tolerated the procedure and the anesthesia well.  All counts were correct at the completion of the procedure.  I was present and performed the entire procedure.  Ms Dela Cruz assisted as there was no qualified resident available to participate in the procedure.     FINAL DIAGNOSIS:  DDD pacemaker with Elective Replacement Indicator.    Chang Garcia MD, FACS

## 2020-10-08 NOTE — DISCHARGE SUMMARY
[facility]  Discharge Note  Short Stay    Procedure(s) (LRB):  REPLACEMENT-PACEMAKER GENERATOR (Right)       Marcello is a 45 y.o. Male with a complex past medical history. He is followed by Dr. Acosta in the [facility] clinic.      To summarize, his diagnoses are as follows:  1. 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  2. History of a classic Roger shunt and a 12 mm conduit from the right atrium to the left pulmonary artery along with a Damus Judy Stansel operation - now with trivial native and mild to moderate brigette-aortic insufficiency   3. Subsequent lateral tunnel Fontan operation utilizing a 16 mm ring Goretex graft from the left pulmonary artery to the right pulmonary artery.  4. 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 Last cardioversion several years ago  5. Significantly elevated central venous pressures with chronic desaturation at least partially due to a collateral vessels - improved after catheterization.  6. Varicose veins followed by vascular medicine - improved.  7. No evidence of protein-losing enteropathy.  8. Mild AV valve insuffiency.  9 High ventricular rates on his device s/p negative NIEPS on 4/6/16   10. Device near BLANK -  generator change today     Patient tolerated pacemaker generator change. New generator- [employer] S#[id number]. The patient was awakened from sedation and taken to the PACU for recovery. He recovered well, had good pain control, and is stable for discharge at this time.     Wound care instructions given. May restart coumadin tomorrow.     OUTCOME: Patient tolerated treatment/procedure well without complication and is now ready for discharge.    DISPOSITION: Home or Self Care    FINAL DIAGNOSIS:  Pacemaker at end of battery life    FOLLOWUP: In clinic    DISCHARGE INSTRUCTIONS:    Discharge Procedure Orders   Diet Adult  Regular     Lifting restrictions   Order Comments: No heavy lifting for two weeks on right side     Notify your health care provider if you experience any of the following:  temperature >100.4     Notify your health care provider if you experience any of the following:  persistent nausea and vomiting or diarrhea     Notify your health care provider if you experience any of the following:  severe uncontrolled pain     Notify your health care provider if you experience any of the following:  redness, tenderness, or signs of infection (pain, swelling, redness, odor or green/yellow discharge around incision site)     Remove dressing in 48 hours   Order Comments: May shower after dressing removal in 48 hours. Clean with soap and water. Keep dry     Activity as tolerated

## 2020-10-08 NOTE — PROGRESS NOTES
Discharge instructions reviewed w/ pt and wife, verbalized understanding. Pt in NADN. No complaints at this time. Tolerated liquids w/ no issues. To be d/c'd home w/ wife.

## 2020-10-08 NOTE — PROGRESS NOTES
Attempted to call pacemaker clinic at 0710 left message. Spoke with Jie at 0723 about pt, no interventions need to be done during procedure.

## 2020-10-08 NOTE — TRANSFER OF CARE
"Anesthesia Transfer of Care Note    Patient: Marcello Briggs    Procedure(s) Performed: Procedure(s) (LRB):  REPLACEMENT-PACEMAKER GENERATOR (Right)    Patient location: PACU    Anesthesia Type: general    Transport from OR: Transported from OR on 6-10 L/min O2 by face mask with adequate spontaneous ventilation    Post pain: adequate analgesia    Post assessment: tolerated procedure well and no apparent anesthetic complications    Post vital signs: stable    Level of consciousness: awake, alert and oriented    Nausea/Vomiting: no nausea/vomiting    Complications: none    Transfer of care protocol was followed      Last vitals:   Visit Vitals  /61   Pulse 75   Temp 36.7 °C (98 °F) (Oral)   Resp 16   Ht 5' 3" (1.6 m)   Wt 75.1 kg (165 lb 9.1 oz)   SpO2 (!) 92%   BMI 29.33 kg/m²     "

## 2020-10-08 NOTE — ANESTHESIA PREPROCEDURE EVALUATION
10/08/2020  Marcello Briggs is a 45 y.o., male.    Anesthesia Evaluation    I have reviewed the Patient Summary Reports.    I have reviewed the Nursing Notes. I have reviewed the NPO Status.      Review of Systems  Anesthesia Hx:  Denies Hx of Anesthetic complications  History of prior surgery of interest to airway management or planning: Denies Family Hx of Anesthesia complications.   Denies Personal Hx of Anesthesia complications.   Social:  Non-Smoker, No Alcohol Use    Hematology/Oncology:  Hematology Normal   Oncology Normal     EENT/Dental:EENT/Dental Normal   Cardiovascular:   Denies CAD.   Dysrhythmias  ECG has been reviewed. IMPRESSION -   Technically difficult imaging -  Images consistent with tricuspid atresia, d-TGA, VSD and RV hypoplasia S/P DKS and lateral tunnel Fontan.  Qualitative impression of mildly decreased LV function compared to previous study with no other significant changes noted.  Dilated inferior vena cava and hepatic veins joining very dilated lateral tunnel Fontan and slow moving spontaneous contrast, laminar flow and no obvious thrombus in IVC, hepatic veins, proximal lateral tunnel or SVC.  Unable to demonstrate remaining segments of Fontan circuit and pulmonary arteries with very limited suprasternal windows.  Small functional right atrium.  Unrestricted bidirectional flow at atrial septum.   At least moderate left atrial enlargement.   Large mitral annulus.  Mild to moderate  mitral valve insufficiency.   Dilated left ventricle - severe.   Qualitatively reduced left (single) ventricle systolic function with SF = 25% and EF estimated 40 -45% from apical views.   Unrestricted flow at VSD to anterior aorta and small subaortic chamber (Right ventricle).   Anterior native aortic valve with no stenosis and mild insufficiency.   Posteriorly positioned trileaflet pulmonary valve  committed to the left ventricle with no left ventricular outflow obstruction, mild insufficiency and no valvar stenosis.   Good imaging of the DKS anastomosis with no evidence of obstruction in either limb.    No pericardial effusion.      Pulmonary:   Asthma Sleep Apnea    Renal/:  Renal/ Normal     Hepatic/GI:  Hepatic/GI Normal    Musculoskeletal:  Musculoskeletal Normal    Neurological:  Neurology Normal    Endocrine:  Endocrine Normal    Dermatological:  Skin Normal        Physical Exam  General:  Well nourished    Airway/Jaw/Neck:  Airway Findings: Mouth Opening: Normal Tongue: Normal  General Airway Assessment: Adult  Mallampati: III  Improves to II with phonation.  TM Distance: Normal, at least 6 cm      Dental:  Dental Findings: In tact   Chest/Lungs:  Chest/Lungs Findings: Clear to auscultation, Normal Respiratory Rate     Heart/Vascular:  Heart Murmur        Mental Status:  Mental Status Findings:  Cooperative, Alert and Oriented         Anesthesia Plan  Type of Anesthesia, risks & benefits discussed:  Anesthesia Type:  general  Patient's Preference:   Intra-op Monitoring Plan: standard ASA monitors  Intra-op Monitoring Plan Comments:   Post Op Pain Control Plan: multimodal analgesia  Post Op Pain Control Plan Comments:   Induction:   IV  Beta Blocker:  Patient is on a Beta-Blocker and has received one dose within the past 24 hours (No further documentation required).       Informed Consent: Patient understands risks and agrees with Anesthesia plan.  Questions answered. Anesthesia consent signed with patient.  ASA Score: 3     Day of Surgery Review of History & Physical:    H&P update referred to the surgeon.         Ready For Surgery From Anesthesia Perspective.

## 2020-10-08 NOTE — INTERVAL H&P NOTE
The patient has been examined and the H&P has been reviewed:    I concur with the findings and no changes have occurred since H&P was written.    Surgery risks, benefits and alternative options discussed and understood by patient/family.          Active Hospital Problems    Diagnosis  POA    Pacemaker at end of battery life [Z45.010]  Not Applicable      Resolved Hospital Problems   No resolved problems to display.

## 2020-10-09 DIAGNOSIS — I49.9 VENTRICULAR ARRHYTHMIA: ICD-10-CM

## 2020-10-09 DIAGNOSIS — Q24.9 ADULT CONGENITAL HEART DISEASE: ICD-10-CM

## 2020-10-09 DIAGNOSIS — Q21.0 VSD (VENTRICULAR SEPTAL DEFECT AND AORTIC ARCH HYPOPLASIA: ICD-10-CM

## 2020-10-09 DIAGNOSIS — Q24.9 HEART FAILURE DUE TO CONGENITAL HEART DISEASE: ICD-10-CM

## 2020-10-09 DIAGNOSIS — I50.9 HEART FAILURE DUE TO CONGENITAL HEART DISEASE: ICD-10-CM

## 2020-10-09 DIAGNOSIS — Q25.42 VSD (VENTRICULAR SEPTAL DEFECT AND AORTIC ARCH HYPOPLASIA: ICD-10-CM

## 2020-10-09 DIAGNOSIS — Q20.3 TRICUSPID ATRESIA WITH D-TRANSPOSITION OF GREAT ARTERIES: Primary | ICD-10-CM

## 2020-10-09 DIAGNOSIS — Z95.0 PACEMAKER: ICD-10-CM

## 2020-10-09 DIAGNOSIS — Q22.4 TRICUSPID ATRESIA WITH D-TRANSPOSITION OF GREAT ARTERIES: Primary | ICD-10-CM

## 2020-10-09 NOTE — ANESTHESIA POSTPROCEDURE EVALUATION
Anesthesia Post Evaluation    Patient: Marcello Briggs    Procedure(s) Performed: Procedure(s) (LRB):  REPLACEMENT-PACEMAKER GENERATOR (Right)    Final Anesthesia Type: general    Patient location during evaluation: PACU  Patient participation: Yes- Able to Participate  Level of consciousness: awake and alert and oriented  Post-procedure vital signs: reviewed and stable  Pain management: adequate  Airway patency: patent    PONV status at discharge: No PONV  Anesthetic complications: no      Cardiovascular status: blood pressure returned to baseline and hemodynamically stable  Respiratory status: unassisted  Hydration status: euvolemic  Follow-up not needed.          Vitals Value Taken Time   /55 10/08/20 0930   Temp 36.6 °C (97.9 °F) 10/08/20 0930   Pulse 76 10/08/20 0930   Resp 12 10/08/20 0930   SpO2 89 % 10/08/20 0930         No case tracking events are documented in the log.      Pain/Kaye Score: Kaye Score: 10 (10/8/2020  9:02 AM)

## 2020-10-10 LAB
BLD PROD TYP BPU: NORMAL
BLOOD UNIT EXPIRATION DATE: NORMAL
BLOOD UNIT TYPE CODE: 5100
BLOOD UNIT TYPE: NORMAL
CODING SYSTEM: NORMAL
DISPENSE STATUS: NORMAL
NUM UNITS TRANS PACKED RBC: NORMAL

## 2020-10-19 LAB
FINAL PATHOLOGIC DIAGNOSIS: NORMAL
GROSS: NORMAL
Lab: NORMAL

## 2020-10-22 ENCOUNTER — CLINICAL SUPPORT (OUTPATIENT)
Dept: CARDIOLOGY | Facility: CLINIC | Age: 45
End: 2020-10-22
Attending: PEDIATRICS
Payer: COMMERCIAL

## 2020-10-22 ENCOUNTER — HOSPITAL ENCOUNTER (OUTPATIENT)
Dept: CARDIOLOGY | Facility: CLINIC | Age: 45
Discharge: HOME | End: 2020-10-22
Payer: COMMERCIAL

## 2020-10-22 ENCOUNTER — OFFICE VISIT (OUTPATIENT)
Dept: CARDIOLOGY | Facility: CLINIC | Age: 45
End: 2020-10-22
Payer: COMMERCIAL

## 2020-10-22 VITALS
WEIGHT: 164.88 LBS | DIASTOLIC BLOOD PRESSURE: 63 MMHG | OXYGEN SATURATION: 85 % | BODY MASS INDEX: 29.21 KG/M2 | HEART RATE: 75 BPM | SYSTOLIC BLOOD PRESSURE: 122 MMHG | HEIGHT: 63 IN

## 2020-10-22 DIAGNOSIS — Q22.4 TRICUSPID ATRESIA WITH D-TRANSPOSITION OF GREAT ARTERIES: ICD-10-CM

## 2020-10-22 DIAGNOSIS — Q25.42 VSD (VENTRICULAR SEPTAL DEFECT AND AORTIC ARCH HYPOPLASIA: ICD-10-CM

## 2020-10-22 DIAGNOSIS — Q24.9 ADULT CONGENITAL HEART DISEASE: ICD-10-CM

## 2020-10-22 DIAGNOSIS — Q21.0 VSD (VENTRICULAR SEPTAL DEFECT AND AORTIC ARCH HYPOPLASIA: ICD-10-CM

## 2020-10-22 DIAGNOSIS — Q20.3 TRICUSPID ATRESIA WITH D-TRANSPOSITION OF GREAT ARTERIES: ICD-10-CM

## 2020-10-22 DIAGNOSIS — Q24.9 HEART FAILURE DUE TO CONGENITAL HEART DISEASE: ICD-10-CM

## 2020-10-22 DIAGNOSIS — Z95.0 PACEMAKER: ICD-10-CM

## 2020-10-22 DIAGNOSIS — I49.9 VENTRICULAR ARRHYTHMIA: ICD-10-CM

## 2020-10-22 DIAGNOSIS — Z98.890 S/P FONTAN PROCEDURE: ICD-10-CM

## 2020-10-22 DIAGNOSIS — I48.92 ATRIAL FLUTTER, UNSPECIFIED TYPE: ICD-10-CM

## 2020-10-22 DIAGNOSIS — Z95.0 CARDIAC PACEMAKER IN SITU: ICD-10-CM

## 2020-10-22 DIAGNOSIS — I50.9 HEART FAILURE DUE TO CONGENITAL HEART DISEASE: ICD-10-CM

## 2020-10-22 DIAGNOSIS — Q24.9 ADULT CONGENITAL HEART DISEASE: Primary | ICD-10-CM

## 2020-10-22 PROCEDURE — 93280 PM DEVICE PROGR EVAL DUAL: CPT | Mod: S$GLB,,, | Performed by: PEDIATRICS

## 2020-10-22 PROCEDURE — 93005 EKG 12-LEAD: ICD-10-PCS | Mod: S$GLB,,, | Performed by: PEDIATRICS

## 2020-10-22 PROCEDURE — 93010 EKG 12-LEAD: ICD-10-PCS | Mod: S$GLB,,, | Performed by: INTERNAL MEDICINE

## 2020-10-22 PROCEDURE — 99215 OFFICE O/P EST HI 40 MIN: CPT | Mod: 25,S$GLB,, | Performed by: PEDIATRICS

## 2020-10-22 PROCEDURE — 93010 ELECTROCARDIOGRAM REPORT: CPT | Mod: S$GLB,,, | Performed by: INTERNAL MEDICINE

## 2020-10-22 PROCEDURE — 99999 PR PBB SHADOW E&M-EST. PATIENT-LVL IV: ICD-10-PCS | Mod: PBBFAC,,, | Performed by: PEDIATRICS

## 2020-10-22 PROCEDURE — 99999 PR PBB SHADOW E&M-EST. PATIENT-LVL I: CPT | Mod: PBBFAC,,,

## 2020-10-22 PROCEDURE — 99999 PR PBB SHADOW E&M-EST. PATIENT-LVL I: ICD-10-PCS | Mod: PBBFAC,,,

## 2020-10-22 PROCEDURE — 99215 PR OFFICE/OUTPT VISIT, EST, LEVL V, 40-54 MIN: ICD-10-PCS | Mod: 25,S$GLB,, | Performed by: PEDIATRICS

## 2020-10-22 PROCEDURE — 93280 CV PACEMAKER PROGRAMMING PEDIATRICS (CUPID ONLY): ICD-10-PCS | Mod: S$GLB,,, | Performed by: PEDIATRICS

## 2020-10-22 PROCEDURE — 93005 ELECTROCARDIOGRAM TRACING: CPT | Mod: S$GLB,,, | Performed by: PEDIATRICS

## 2020-10-22 PROCEDURE — 99999 PR PBB SHADOW E&M-EST. PATIENT-LVL IV: CPT | Mod: PBBFAC,,, | Performed by: PEDIATRICS

## 2020-10-22 PROCEDURE — 3008F PR BODY MASS INDEX (BMI) DOCUMENTED: ICD-10-PCS | Mod: CPTII,S$GLB,, | Performed by: PEDIATRICS

## 2020-10-22 PROCEDURE — 3008F BODY MASS INDEX DOCD: CPT | Mod: CPTII,S$GLB,, | Performed by: PEDIATRICS

## 2020-10-22 NOTE — PROGRESS NOTES
Thank you for referring your patient Marcello Briggs to the adult congenital electrophysiology clinic for consultation. Please review my findings below.    CHIEF COMPLAINT: F/u for arrhythmias.    HISTORY OF PRESENT ILLNESS:  Marcello is a 45 y.o. Male with a complex past medical history. Followed by Dr. Acosta in the [facility] clinic.     To summarize, his diagnoses are as follows:  1. 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  2. History of a classic Roger shunt and a 12 mm conduit from the right atrium to the left pulmonary artery along with a Damus Judy Stansel operation - now with trivial native and mild to moderate brigette-aortic insufficiency   3. Subsequent lateral tunnel Fontan operation utilizing a 16 mm ring Goretex graft from the left pulmonary artery to the right pulmonary artery.  4. 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 Last cardioversion several years ago.  5. Significantly elevated central venous pressures with chronic desaturation at least partially due to a collateral vessels - improved after catheterization.  6. Varicose veins followed by vascular medicine - improved.  7. No evidence of protein-losing enteropathy.  8. Mild AV valve insuffiency.  9 High ventricular rates on his device s/p negative NIEPS on 4/6/16  10. Pacemaker generator replacement (10/8/20)    On July 7, 2010 he underwent a cardiac catheterization:   1. Complex univentricular cardiac defect with superior inferior ventricles with double inlet left ventricle (atrioventricular connection not defined at catheterization), status post Ircea-Jzcf-Xbabnwe connection and intracardiac lateral tunnel Fontan.   2. Moderate neoaortic (pulmonary) valve insufficiency.   3. Left aorta.   4. 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   5. Elevated central venous pressure (mean 19),  transpulmonary gradient 4-5 mmHg.    Holter (9/18/20)  Predominant rhythm is AV sequential pacing  Rare atrial/ventricular ectopy  20 episodes of slow SVT (longest 9 beats fastest 133 bpm)  One 4 beat episode of slow VT (max rate 156 bpm) and one ventricular triplet  No diary symptoms    Marcello is here for a wound check s/p pacemaker generator replacement.  He is overall doing well.  His incision is healing well.  He is back at work and denies any fever or discharge from wound.  He has no chest pain or difficulty breathing.  He has no syncope or near syncope.  He has good activity.  He has no fatigue by report.  He denies palpitations.      REVIEW OF SYSTEMS:   GENERAL: No fever, chills,or weight loss.    SKIN: No rashes, itching or changes in color or texture of skin.  CHEST: see HPI  CARDIOVASCULAR: Denies chest pain or reduced exercise tolerance.  ABDOMEN: Appetite fine. No weight loss. Denies diarrhea, abdominal pain, or vomiting.  NEUROLOGIC: No history of seizures,  alteration of gait or coordination.    PAST MEDICAL HISTORY:   Past Medical History:   Diagnosis Date    Asthma     Atrial flutter     Cyanosis     Heart murmur     Intra-atrial reentrant tachycardia     MIGUEL (obstructive sleep apnea) 10/23/2014    TGA (transposition of great arteries)     Tricuspid atresia     VSD (ventricular septal defect)        FAMILY HISTORY:   The family history is negative for congenital heart disease and sudden death in individuals less than 40 years old.    SOCIAL HISTORY:   Social History     Socioeconomic History    Marital status: Legally      Spouse name: Not on file    Number of children: Not on file    Years of education: Not on file    Highest education level: Not on file   Occupational History    Not on file   Social Needs    Financial resource strain: Not on file    Food insecurity     Worry: Not on file     Inability: Not on file    Transportation needs     Medical: Not on file      Non-medical: Not on file   Tobacco Use    Smoking status: Never Smoker    Smokeless tobacco: Never Used   Substance and Sexual Activity    Alcohol use: No    Drug use: No    Sexual activity: Not on file   Lifestyle    Physical activity     Days per week: Not on file     Minutes per session: Not on file    Stress: Not on file   Relationships    Social connections     Talks on phone: Not on file     Gets together: Not on file     Attends Rastafari service: Not on file     Active member of club or organization: Not on file     Attends meetings of clubs or organizations: Not on file     Relationship status: Not on file   Other Topics Concern    Not on file   Social History Narrative    He is working at [employer].       ALLERGIES:  No Known Allergies    MEDICATIONS:    Current Outpatient Medications:     digoxin (LANOXIN) 250 mcg tablet, Take 1 tablet (0.25 mg total) by mouth every morning., Disp: 90 tablet, Rfl: 3    enalapril (VASOTEC) 10 MG tablet, TAKE 1 TABLET BY MOUTH TWICE A DAY, Disp: 180 tablet, Rfl: 3    furosemide (LASIX) 20 MG tablet, TAKE 1 TABLET (20 MG TOTAL) BY MOUTH ONCE DAILY., Disp: 60 tablet, Rfl: 6    metoprolol succinate (TOPROL-XL) 100 MG 24 hr tablet, Take 3 tablets (300 mg total) by mouth once daily., Disp: 270 tablet, Rfl: 3    sildenafil (REVATIO) 20 mg Tab, TAKE 1 TABLET BY MOUTH TWICE A DAY, Disp: 180 tablet, Rfl: 3    sotaloL (BETAPACE) 240 MG Tab, Take 1 tablet (240 mg total) by mouth 2 (two) times daily., Disp: 60 tablet, Rfl: 11    warfarin (COUMADIN) 2 MG tablet, Take 10 mg (6mg + 2 (2mg) tabs every Saturday and 8mg (6mg +2 mg) all other days as directed (Patient taking differently: Take 8 mg daily (6mg + 2mg)), Disp: 35 tablet, Rfl: 11    warfarin (COUMADIN) 6 MG tablet, TAKE 10MG (6MG + 2 (2MG) TABS EVERY SATURDAY AND 8MG (6MG + 2MG) TABS ALL OTHER DAYS AS DIRECTED, Disp: 30 tablet, Rfl: 11      PHYSICAL EXAM:   Vitals:    10/22/20 1305 10/22/20 1307   BP: 123/65  "122/63   BP Location: Right arm Left arm   Patient Position: Sitting Sitting   BP Method: Large (Automatic) Large (Automatic)   Pulse: 75 75   SpO2: (!) 85%    Weight: 74.8 kg (164 lb 14.5 oz)    Height: 5' 3" (1.6 m)        GENERAL: Awake, well-developed well-nourished, no apparent distress  HEENT: mucous membranes moist and pink, normocephalic atraumatic, no cranial or carotid bruits, sclera anicteric  NECK: no jugular venous distention, no lymphadenopathy  CHEST: Good air movement, clear to auscultation bilaterally.  Pacemaker incision healing well.    CARDIOVASCULAR: Quiet precordium, regular rate and rhythm, no rubs or gallops.  A grade 1/6 systolic ejection murmur is auscultated. No diastolic murmurs or gallops are heard.  ABDOMEN: Soft, nontender nondistended, Liver edge palpated 1 cm below costal margin.  EXTREMITIES: Warm well perfused, 2+ radial/pedal pulses, capillary refill 2 seconds, no clubbing, cyanosis, or edema  NEURO: Alert and oriented, cooperative with exam, face symmetric, moves all extremities well      STUDIES:  ECG: AV sequential pacing with prolonged AV delay.    Pacemaker Interrogation:   Following physician: Dereje    Permanent Programming   RA Lead: 2.0 V @ 1 ms. Sensitivity: 1 mV.   RV Lead: 1.625 AUTO V @ 0.5 ms. Sensitivity: 2 mV.     Pacemaker Generator and Leads meet standard of FDA approval.     Chamber type: dual.   Mode: DDDR   Lower limit rate: 75 bpm   Upper tracking rate: 130 bpm     AV Delay  Fixed: 200 msec     PV Delay  Fixed: 200 msec   Device Analysis 2    Battery voltage: 3.04 V  Estimated longevity: 5.8 - 6.3 years.   Magnet Rate: 100.0 ppm      Other: AMS Entries, 25 available EGM's show noise artifact     Leads  RA Lead:        P/R-wave: (none)Paced mV       Lead Impedance: 300 Ohms       Paced: 100%   RV Lead:        P/R-wave: 9.2  mV       Impedance: 350 Ohms       Paced: 99%       Thresholds  RA Lead: 0.75 V @ 1 ms. Configuration: unipolar.   RV Lead: 1.25 V @ 0.5 " ms. Configuration: bipolar.   Device Comments    Wound check comments:   Healing incision, no drainage or bleeding to incision site. Some mild edema remains around device. Patient has no complaints of pain.     Reprogramming comments:   A sensitivity 1.0 mv --> 2.0 mv    General comments:   Device interrogation and lead testing performed. Device and leads WNL.  73 AMS entries, 25 available EGMs reveal noise artifact      Scheduled for REMOTE transmission on Monday, January 11, 2021           ASSESSMENT:  45 y.o. male with TGA and tricuspid atresia s/p Fontan with pacemaker.  He is asymptomatic at present.  He continues to have some slow atrial and ventricular arrhythmias on his Holter but denies any symptoms.        PLAN:   - Metoprolol 300 mg XL daily  - Sotalol 240 mg po BID  - Continue Dig to 250mcg daily  - Continue anticoagulation  - Patient asked to call with palpitations, syncope, worsening fatigue, chest pain, or any other questions or concerns in the interim.  - Schedule follow up in 6 months with ECG, and pacemaker check.  - SBE prophylaxis as per Dr. Acosta        The patient's doctor will be notified via EPIC/FAX    I hope this brings you up-to-date on Marcello Briggs  Please contact me with any questions or concerns.    Jaz Reyez M.D.  Pediatric Cardiology   Pediatric Electrophysiology

## 2020-10-23 LAB
AV DELAY - FIXED: 200 MSEC
BATTERY VOLTAGE (V): 3.04 V
IMPEDANCE RA LEAD (NATIVE): 350 OHMS
IMPEDANCE RA LEAD: 300 OHMS
OHS CV DC PP MS1: 1 MS
OHS CV DC PP MS2: 0.5 MS
OHS CV DC PP V1: 2 V
OHS CV DC PP V2: NORMAL V
P/R-WAVE RA LEAD (NATIVE): 9.2 MV
PV DELAY - FIXED: 200 MSEC
THRESHOLD MS RA LEAD (NATIVE): 0.5 MS
THRESHOLD MS RA LEAD: 1 MS
THRESHOLD V RA LEAD (NATIVE): 1.25 V
THRESHOLD V RA LEAD: 0.75 V

## 2020-11-09 DIAGNOSIS — I27.29 PULMONARY ARTERIAL HYPERTENSION ASSOCIATED WITH CONGENITAL HEART DISEASE: ICD-10-CM

## 2020-11-09 DIAGNOSIS — Z98.890 S/P FONTAN PROCEDURE: ICD-10-CM

## 2020-11-09 DIAGNOSIS — Q24.9 PULMONARY ARTERIAL HYPERTENSION ASSOCIATED WITH CONGENITAL HEART DISEASE: ICD-10-CM

## 2020-11-09 RX ORDER — SILDENAFIL CITRATE 20 MG/1
20 TABLET ORAL 2 TIMES DAILY
Qty: 180 TABLET | Refills: 3 | Status: SHIPPED | OUTPATIENT
Start: 2020-11-09 | End: 2021-01-13 | Stop reason: SDUPTHER

## 2020-11-17 ENCOUNTER — TELEPHONE (OUTPATIENT)
Dept: VASCULAR SURGERY | Facility: CLINIC | Age: 45
End: 2020-11-17

## 2020-11-17 NOTE — TELEPHONE ENCOUNTER
Attempted to contact Marcello to complete post surgical discharge follow up, no answer, left message to contact office.

## 2020-11-27 RX ORDER — WARFARIN 6 MG/1
TABLET ORAL
Qty: 30 TABLET | Refills: 11 | Status: SHIPPED | OUTPATIENT
Start: 2020-11-27 | End: 2021-03-09 | Stop reason: SDUPTHER

## 2020-12-28 ENCOUNTER — DOCUMENTATION ONLY (OUTPATIENT)
Dept: CARDIOTHORACIC SURGERY | Facility: CLINIC | Age: 45
End: 2020-12-28

## 2020-12-28 NOTE — PROGRESS NOTES
Spoke with patient by phone for 30 day post discharge update. Patient stated that he was doing well and had not been in the hospital since his discharge.

## 2021-01-01 NOTE — PROGRESS NOTES
Patient stated to clinic staff member another physician is monitoring his Coumadin now.  Patient will be discharged from Coumadin Clinic.   yes

## 2021-01-13 DIAGNOSIS — Z98.890 S/P FONTAN PROCEDURE: ICD-10-CM

## 2021-01-13 DIAGNOSIS — Q24.9 PULMONARY ARTERIAL HYPERTENSION ASSOCIATED WITH CONGENITAL HEART DISEASE: ICD-10-CM

## 2021-01-13 DIAGNOSIS — I27.29 PULMONARY ARTERIAL HYPERTENSION ASSOCIATED WITH CONGENITAL HEART DISEASE: ICD-10-CM

## 2021-01-14 RX ORDER — ENALAPRIL MALEATE 10 MG/1
10 TABLET ORAL 2 TIMES DAILY
Qty: 180 TABLET | Refills: 3 | Status: SHIPPED | OUTPATIENT
Start: 2021-01-14 | End: 2022-06-17 | Stop reason: SDUPTHER

## 2021-01-14 RX ORDER — SILDENAFIL CITRATE 20 MG/1
20 TABLET ORAL 2 TIMES DAILY
Qty: 180 TABLET | Refills: 3 | Status: SHIPPED | OUTPATIENT
Start: 2021-01-14 | End: 2021-03-16 | Stop reason: SDUPTHER

## 2021-01-22 ENCOUNTER — PATIENT MESSAGE (OUTPATIENT)
Dept: PEDIATRIC CARDIOLOGY | Facility: CLINIC | Age: 46
End: 2021-01-22

## 2021-01-25 ENCOUNTER — HOSPITAL ENCOUNTER (OUTPATIENT)
Dept: PEDIATRIC CARDIOLOGY | Facility: HOSPITAL | Age: 46
Discharge: HOME | End: 2021-01-25
Attending: PEDIATRICS
Payer: COMMERCIAL

## 2021-01-25 DIAGNOSIS — Q24.9 ADULT CONGENITAL HEART DISEASE: ICD-10-CM

## 2021-01-25 DIAGNOSIS — Z95.0 PACEMAKER: ICD-10-CM

## 2021-01-25 DIAGNOSIS — Q25.42 VSD (VENTRICULAR SEPTAL DEFECT AND AORTIC ARCH HYPOPLASIA: ICD-10-CM

## 2021-01-25 DIAGNOSIS — I50.9 HEART FAILURE DUE TO CONGENITAL HEART DISEASE: ICD-10-CM

## 2021-01-25 DIAGNOSIS — Q21.0 VSD (VENTRICULAR SEPTAL DEFECT AND AORTIC ARCH HYPOPLASIA: ICD-10-CM

## 2021-01-25 DIAGNOSIS — Q20.3 TRICUSPID ATRESIA WITH D-TRANSPOSITION OF GREAT ARTERIES: ICD-10-CM

## 2021-01-25 DIAGNOSIS — Q22.4 TRICUSPID ATRESIA WITH D-TRANSPOSITION OF GREAT ARTERIES: ICD-10-CM

## 2021-01-25 DIAGNOSIS — Z98.890 S/P FONTAN PROCEDURE: ICD-10-CM

## 2021-01-25 DIAGNOSIS — Q24.9 HEART FAILURE DUE TO CONGENITAL HEART DISEASE: ICD-10-CM

## 2021-01-25 DIAGNOSIS — I48.92 ATRIAL FLUTTER, UNSPECIFIED TYPE: ICD-10-CM

## 2021-01-25 PROCEDURE — 93296 REM INTERROG EVL PM/IDS: CPT

## 2021-01-25 PROCEDURE — 93294 REM INTERROG EVL PM/LDLS PM: CPT | Mod: ,,, | Performed by: PEDIATRICS

## 2021-01-25 PROCEDURE — 93294 CV PACEMAKER REMOTE PEDIATRICS (CUPID ONLY): ICD-10-PCS | Mod: ,,, | Performed by: PEDIATRICS

## 2021-01-28 LAB
AV DELAY - LONGEST: 200 MSEC
BATTERY VOLTAGE (V): 2.99 V
IMPEDANCE RA LEAD (NATIVE): 350 OHMS
IMPEDANCE RA LEAD: 310 OHMS
OHS CV DC PP MS1: 1 MS
OHS CV DC PP MS2: 0.5 MS
OHS CV DC PP V1: 2 V
OHS CV DC PP V2: NORMAL V
P/R-WAVE RA LEAD (NATIVE): 10.1 MV
PV DELAY - LONGEST: 200 MSEC
THRESHOLD MS RA LEAD: 1 MS
THRESHOLD V RA LEAD: 1.38 V

## 2021-01-29 ENCOUNTER — TELEPHONE (OUTPATIENT)
Dept: PEDIATRIC CARDIOLOGY | Facility: CLINIC | Age: 46
End: 2021-01-29

## 2021-02-02 ENCOUNTER — PATIENT MESSAGE (OUTPATIENT)
Dept: PEDIATRIC CARDIOLOGY | Facility: CLINIC | Age: 46
End: 2021-02-02

## 2021-02-03 ENCOUNTER — PATIENT MESSAGE (OUTPATIENT)
Dept: PEDIATRIC CARDIOLOGY | Facility: CLINIC | Age: 46
End: 2021-02-03

## 2021-03-01 ENCOUNTER — PATIENT MESSAGE (OUTPATIENT)
Dept: PEDIATRIC CARDIOLOGY | Facility: CLINIC | Age: 46
End: 2021-03-01

## 2021-03-09 RX ORDER — WARFARIN 6 MG/1
TABLET ORAL
Qty: 30 TABLET | Refills: 11 | Status: SHIPPED | OUTPATIENT
Start: 2021-03-09 | End: 2022-06-17 | Stop reason: SDUPTHER

## 2021-03-09 RX ORDER — DIGOXIN 250 MCG
0.25 TABLET ORAL EVERY MORNING
Qty: 90 TABLET | Refills: 3 | Status: SHIPPED | OUTPATIENT
Start: 2021-03-09 | End: 2022-06-17 | Stop reason: SDUPTHER

## 2021-03-16 ENCOUNTER — PATIENT MESSAGE (OUTPATIENT)
Dept: PEDIATRIC CARDIOLOGY | Facility: CLINIC | Age: 46
End: 2021-03-16

## 2021-03-16 DIAGNOSIS — Q24.9 PULMONARY ARTERIAL HYPERTENSION ASSOCIATED WITH CONGENITAL HEART DISEASE: ICD-10-CM

## 2021-03-16 DIAGNOSIS — Z98.890 S/P FONTAN PROCEDURE: ICD-10-CM

## 2021-03-16 DIAGNOSIS — I27.29 PULMONARY ARTERIAL HYPERTENSION ASSOCIATED WITH CONGENITAL HEART DISEASE: ICD-10-CM

## 2021-03-16 RX ORDER — SILDENAFIL CITRATE 20 MG/1
20 TABLET ORAL 2 TIMES DAILY
Qty: 180 TABLET | Refills: 3 | Status: SHIPPED | OUTPATIENT
Start: 2021-03-16 | End: 2022-06-17 | Stop reason: SDUPTHER

## 2021-04-26 ENCOUNTER — HOSPITAL ENCOUNTER (OUTPATIENT)
Dept: PEDIATRIC CARDIOLOGY | Facility: HOSPITAL | Age: 46
Discharge: HOME | End: 2021-04-26
Attending: PEDIATRICS
Payer: COMMERCIAL

## 2021-04-26 DIAGNOSIS — Q24.9 HEART FAILURE DUE TO CONGENITAL HEART DISEASE: ICD-10-CM

## 2021-04-26 DIAGNOSIS — Q20.3 TRICUSPID ATRESIA WITH D-TRANSPOSITION OF GREAT ARTERIES: ICD-10-CM

## 2021-04-26 DIAGNOSIS — Q21.0 VSD (VENTRICULAR SEPTAL DEFECT AND AORTIC ARCH HYPOPLASIA: ICD-10-CM

## 2021-04-26 DIAGNOSIS — I50.9 HEART FAILURE DUE TO CONGENITAL HEART DISEASE: ICD-10-CM

## 2021-04-26 DIAGNOSIS — Q22.4 TRICUSPID ATRESIA WITH D-TRANSPOSITION OF GREAT ARTERIES: ICD-10-CM

## 2021-04-26 DIAGNOSIS — I49.9 VENTRICULAR ARRHYTHMIA: ICD-10-CM

## 2021-04-26 DIAGNOSIS — Q25.42 VSD (VENTRICULAR SEPTAL DEFECT AND AORTIC ARCH HYPOPLASIA: ICD-10-CM

## 2021-04-26 DIAGNOSIS — Z95.0 PACEMAKER: ICD-10-CM

## 2021-04-26 DIAGNOSIS — Q24.9 ADULT CONGENITAL HEART DISEASE: ICD-10-CM

## 2021-04-26 PROCEDURE — 93294 REM INTERROG EVL PM/LDLS PM: CPT | Mod: ,,, | Performed by: PEDIATRICS

## 2021-04-26 PROCEDURE — 93296 REM INTERROG EVL PM/IDS: CPT

## 2021-04-26 PROCEDURE — 93294 CV PACEMAKER REMOTE PEDIATRICS (CUPID ONLY): ICD-10-PCS | Mod: ,,, | Performed by: PEDIATRICS

## 2021-04-29 LAB
AV DELAY - LONGEST: 200 MSEC
BATTERY VOLTAGE (V): 2.99 V
IMPEDANCE RA LEAD (NATIVE): 350 OHMS
IMPEDANCE RA LEAD: 310 OHMS
OHS CV DC PP MS1: 1 MS
OHS CV DC PP MS2: 0.5 MS
OHS CV DC PP V1: 2 V
OHS CV DC PP V2: NORMAL V
P/R-WAVE RA LEAD (NATIVE): 8.4 MV
PV DELAY - LONGEST: 200 MSEC
THRESHOLD MS RA LEAD: 0.5 MS
THRESHOLD V RA LEAD: 1.38 V

## 2021-07-26 ENCOUNTER — HOSPITAL ENCOUNTER (OUTPATIENT)
Dept: PEDIATRIC CARDIOLOGY | Facility: HOSPITAL | Age: 46
Discharge: HOME | End: 2021-07-26
Attending: PEDIATRICS
Payer: COMMERCIAL

## 2021-07-26 DIAGNOSIS — Q20.3 TRICUSPID ATRESIA WITH D-TRANSPOSITION OF GREAT ARTERIES: ICD-10-CM

## 2021-07-26 DIAGNOSIS — Q24.9 HEART FAILURE DUE TO CONGENITAL HEART DISEASE: ICD-10-CM

## 2021-07-26 DIAGNOSIS — Q21.0 VSD (VENTRICULAR SEPTAL DEFECT AND AORTIC ARCH HYPOPLASIA: ICD-10-CM

## 2021-07-26 DIAGNOSIS — I50.9 HEART FAILURE DUE TO CONGENITAL HEART DISEASE: ICD-10-CM

## 2021-07-26 DIAGNOSIS — Q22.4 TRICUSPID ATRESIA WITH D-TRANSPOSITION OF GREAT ARTERIES: ICD-10-CM

## 2021-07-26 DIAGNOSIS — Q25.42 VSD (VENTRICULAR SEPTAL DEFECT AND AORTIC ARCH HYPOPLASIA: ICD-10-CM

## 2021-07-26 DIAGNOSIS — Z98.890 S/P FONTAN PROCEDURE: ICD-10-CM

## 2021-07-26 DIAGNOSIS — Z95.0 PACEMAKER: ICD-10-CM

## 2021-07-26 DIAGNOSIS — Q24.9 ADULT CONGENITAL HEART DISEASE: ICD-10-CM

## 2021-07-26 DIAGNOSIS — I48.92 ATRIAL FLUTTER, UNSPECIFIED TYPE: ICD-10-CM

## 2021-07-26 PROCEDURE — 93296 REM INTERROG EVL PM/IDS: CPT

## 2021-07-26 PROCEDURE — 93294 REM INTERROG EVL PM/LDLS PM: CPT | Mod: ,,, | Performed by: PEDIATRICS

## 2021-07-26 PROCEDURE — 93294 CV PACEMAKER REMOTE PEDIATRICS (CUPID ONLY): ICD-10-PCS | Mod: ,,, | Performed by: PEDIATRICS

## 2021-07-27 ENCOUNTER — TELEPHONE (OUTPATIENT)
Dept: PEDIATRIC CARDIOLOGY | Facility: CLINIC | Age: 46
End: 2021-07-27

## 2021-07-29 DIAGNOSIS — Q22.4 TRICUSPID ATRESIA WITH D-TRANSPOSITION OF GREAT ARTERIES: Primary | ICD-10-CM

## 2021-07-29 DIAGNOSIS — I48.92 ATRIAL FLUTTER, UNSPECIFIED TYPE: ICD-10-CM

## 2021-07-29 DIAGNOSIS — I50.9 HEART FAILURE DUE TO CONGENITAL HEART DISEASE: ICD-10-CM

## 2021-07-29 DIAGNOSIS — Q24.9 PULMONARY ARTERIAL HYPERTENSION ASSOCIATED WITH CONGENITAL HEART DISEASE: ICD-10-CM

## 2021-07-29 DIAGNOSIS — Z98.890 S/P FONTAN PROCEDURE: ICD-10-CM

## 2021-07-29 DIAGNOSIS — Q24.9 HEART FAILURE DUE TO CONGENITAL HEART DISEASE: ICD-10-CM

## 2021-07-29 DIAGNOSIS — Q24.9 ADULT CONGENITAL HEART DISEASE: ICD-10-CM

## 2021-07-29 DIAGNOSIS — I49.9 VENTRICULAR ARRHYTHMIA: ICD-10-CM

## 2021-07-29 DIAGNOSIS — Z95.0 PACEMAKER: ICD-10-CM

## 2021-07-29 DIAGNOSIS — Q24.9 HEART FAILURE DUE TO CONGENITAL HEART DISEASE: Primary | ICD-10-CM

## 2021-07-29 DIAGNOSIS — I27.29 PULMONARY ARTERIAL HYPERTENSION ASSOCIATED WITH CONGENITAL HEART DISEASE: ICD-10-CM

## 2021-07-29 DIAGNOSIS — Q20.3 TRICUSPID ATRESIA WITH D-TRANSPOSITION OF GREAT ARTERIES: Primary | ICD-10-CM

## 2021-07-29 DIAGNOSIS — I50.9 HEART FAILURE DUE TO CONGENITAL HEART DISEASE: Primary | ICD-10-CM

## 2021-07-30 LAB
AV DELAY - LONGEST: 200 MSEC
BATTERY VOLTAGE (V): 2.99 V
IMPEDANCE RA LEAD (NATIVE): 350 OHMS
IMPEDANCE RA LEAD: 310 OHMS
OHS CV DC PP MS1: 1 MS
OHS CV DC PP MS2: 0.5 MS
OHS CV DC PP V1: 2 V
OHS CV DC PP V2: NORMAL V
P/R-WAVE RA LEAD (NATIVE): 9.4 MV
P/R-WAVE RA LEAD: 2.4 MV
PV DELAY - LONGEST: 200 MSEC
THRESHOLD MS RA LEAD: 0.5 MS
THRESHOLD V RA LEAD: 1.38 V

## 2021-09-20 ENCOUNTER — PATIENT MESSAGE (OUTPATIENT)
Dept: CARDIOLOGY | Facility: CLINIC | Age: 46
End: 2021-09-20

## 2021-09-24 ENCOUNTER — PATIENT MESSAGE (OUTPATIENT)
Dept: PEDIATRIC CARDIOLOGY | Facility: CLINIC | Age: 46
End: 2021-09-24

## 2021-11-02 ENCOUNTER — TELEPHONE (OUTPATIENT)
Dept: PEDIATRIC CARDIOLOGY | Facility: CLINIC | Age: 46
End: 2021-11-02
Payer: COMMERCIAL

## 2021-11-08 ENCOUNTER — HOSPITAL ENCOUNTER (OUTPATIENT)
Dept: PEDIATRIC CARDIOLOGY | Facility: HOSPITAL | Age: 46
Discharge: HOME | End: 2021-11-08
Attending: PEDIATRICS
Payer: COMMERCIAL

## 2021-11-08 DIAGNOSIS — Z98.890 S/P FONTAN PROCEDURE: ICD-10-CM

## 2021-11-08 DIAGNOSIS — I50.9 HEART FAILURE DUE TO CONGENITAL HEART DISEASE: ICD-10-CM

## 2021-11-08 DIAGNOSIS — Q24.9 HEART FAILURE DUE TO CONGENITAL HEART DISEASE: ICD-10-CM

## 2021-11-08 DIAGNOSIS — Z95.0 PACEMAKER: ICD-10-CM

## 2021-11-08 DIAGNOSIS — Q24.9 ADULT CONGENITAL HEART DISEASE: ICD-10-CM

## 2021-11-08 DIAGNOSIS — I48.92 ATRIAL FLUTTER, UNSPECIFIED TYPE: ICD-10-CM

## 2021-11-08 DIAGNOSIS — I49.9 VENTRICULAR ARRHYTHMIA: ICD-10-CM

## 2021-11-08 PROCEDURE — 93294 CV PACEMAKER REMOTE PEDIATRICS (CUPID ONLY): ICD-10-PCS | Mod: ,,, | Performed by: PEDIATRICS

## 2021-11-08 PROCEDURE — 93294 REM INTERROG EVL PM/LDLS PM: CPT | Mod: ,,, | Performed by: PEDIATRICS

## 2021-11-08 PROCEDURE — 93296 REM INTERROG EVL PM/IDS: CPT

## 2021-11-09 ENCOUNTER — TELEPHONE (OUTPATIENT)
Dept: PEDIATRIC CARDIOLOGY | Facility: CLINIC | Age: 46
End: 2021-11-09
Payer: COMMERCIAL

## 2021-11-12 LAB
AV DELAY - LONGEST: 200 MSEC
BATTERY VOLTAGE (V): 2.99 V
IMPEDANCE RA LEAD (NATIVE): 340 OHMS
IMPEDANCE RA LEAD: 310 OHMS
OHS CV DC PP MS1: 1 MS
OHS CV DC PP MS2: 0.5 MS
OHS CV DC PP V1: 2 V
OHS CV DC PP V2: NORMAL V
P/R-WAVE RA LEAD (NATIVE): 8.5 MV
PV DELAY - LONGEST: 200 MSEC
THRESHOLD MS RA LEAD: 0.5 MS
THRESHOLD V RA LEAD: 1.5 V

## 2022-02-14 ENCOUNTER — HOSPITAL ENCOUNTER (OUTPATIENT)
Dept: PEDIATRIC CARDIOLOGY | Facility: HOSPITAL | Age: 47
Discharge: HOME | End: 2022-02-14
Attending: PEDIATRICS
Payer: COMMERCIAL

## 2022-02-14 DIAGNOSIS — I48.92 ATRIAL FLUTTER, UNSPECIFIED TYPE: ICD-10-CM

## 2022-02-14 DIAGNOSIS — I50.9 HEART FAILURE DUE TO CONGENITAL HEART DISEASE: ICD-10-CM

## 2022-02-14 DIAGNOSIS — Q24.9 HEART FAILURE DUE TO CONGENITAL HEART DISEASE: ICD-10-CM

## 2022-02-14 DIAGNOSIS — Z95.0 PACEMAKER: ICD-10-CM

## 2022-02-14 DIAGNOSIS — Q24.9 ADULT CONGENITAL HEART DISEASE: ICD-10-CM

## 2022-02-14 DIAGNOSIS — I49.9 VENTRICULAR ARRHYTHMIA: ICD-10-CM

## 2022-02-14 DIAGNOSIS — Z98.890 S/P FONTAN PROCEDURE: ICD-10-CM

## 2022-02-14 PROCEDURE — 93294 CV PACEMAKER REMOTE PEDIATRICS (CUPID ONLY): ICD-10-PCS | Mod: ,,, | Performed by: PEDIATRICS

## 2022-02-14 PROCEDURE — 93296 REM INTERROG EVL PM/IDS: CPT

## 2022-02-14 PROCEDURE — 93294 REM INTERROG EVL PM/LDLS PM: CPT | Mod: ,,, | Performed by: PEDIATRICS

## 2022-02-16 LAB
AV DELAY - LONGEST: 200 MSEC
BATTERY VOLTAGE (V): 2.99 V
IMPEDANCE RA LEAD (NATIVE): 350 OHMS
IMPEDANCE RA LEAD: 310 OHMS
OHS CV DC PP MS1: 1 MS
OHS CV DC PP MS2: 0.5 MS
OHS CV DC PP V1: 2 V
OHS CV DC PP V2: NORMAL V
P/R-WAVE RA LEAD (NATIVE): 9.1 MV
P/R-WAVE RA LEAD: 2.9 MV
PV DELAY - LONGEST: 200 MSEC
THRESHOLD MS RA LEAD: 0.5 MS
THRESHOLD V RA LEAD: 1.62 V

## 2022-05-23 ENCOUNTER — PATIENT MESSAGE (OUTPATIENT)
Dept: PEDIATRIC CARDIOLOGY | Facility: CLINIC | Age: 47
End: 2022-05-23
Payer: COMMERCIAL

## 2022-06-07 ENCOUNTER — PATIENT MESSAGE (OUTPATIENT)
Dept: PEDIATRIC CARDIOLOGY | Facility: CLINIC | Age: 47
End: 2022-06-07
Payer: COMMERCIAL

## 2022-06-07 DIAGNOSIS — Q22.4 TRICUSPID ATRESIA WITH D-TRANSPOSITION OF GREAT ARTERIES: Primary | ICD-10-CM

## 2022-06-07 DIAGNOSIS — Q24.9 PULMONARY ARTERIAL HYPERTENSION ASSOCIATED WITH CONGENITAL HEART DISEASE: ICD-10-CM

## 2022-06-07 DIAGNOSIS — I27.29 PULMONARY ARTERIAL HYPERTENSION ASSOCIATED WITH CONGENITAL HEART DISEASE: ICD-10-CM

## 2022-06-07 DIAGNOSIS — I50.9 HEART FAILURE DUE TO CONGENITAL HEART DISEASE: ICD-10-CM

## 2022-06-07 DIAGNOSIS — Q25.42 VSD (VENTRICULAR SEPTAL DEFECT AND AORTIC ARCH HYPOPLASIA: ICD-10-CM

## 2022-06-07 DIAGNOSIS — Q20.3 TRICUSPID ATRESIA WITH D-TRANSPOSITION OF GREAT ARTERIES: Primary | ICD-10-CM

## 2022-06-07 DIAGNOSIS — Q24.9 ADULT CONGENITAL HEART DISEASE: ICD-10-CM

## 2022-06-07 DIAGNOSIS — I48.92 ATRIAL FLUTTER, UNSPECIFIED TYPE: ICD-10-CM

## 2022-06-07 DIAGNOSIS — Z95.0 PACEMAKER: ICD-10-CM

## 2022-06-07 DIAGNOSIS — Z98.890 S/P FONTAN PROCEDURE: ICD-10-CM

## 2022-06-07 DIAGNOSIS — Q21.0 VSD (VENTRICULAR SEPTAL DEFECT AND AORTIC ARCH HYPOPLASIA: ICD-10-CM

## 2022-06-07 DIAGNOSIS — Q24.9 HEART FAILURE DUE TO CONGENITAL HEART DISEASE: ICD-10-CM

## 2022-06-17 ENCOUNTER — PATIENT MESSAGE (OUTPATIENT)
Dept: PEDIATRIC CARDIOLOGY | Facility: CLINIC | Age: 47
End: 2022-06-17
Payer: COMMERCIAL

## 2022-06-17 DIAGNOSIS — Z95.0 PACEMAKER: ICD-10-CM

## 2022-06-17 DIAGNOSIS — Q24.9 PULMONARY ARTERIAL HYPERTENSION ASSOCIATED WITH CONGENITAL HEART DISEASE: Primary | ICD-10-CM

## 2022-06-17 DIAGNOSIS — Z98.890 S/P FONTAN PROCEDURE: ICD-10-CM

## 2022-06-17 DIAGNOSIS — Q20.3 TRICUSPID ATRESIA WITH D-TRANSPOSITION OF GREAT ARTERIES: ICD-10-CM

## 2022-06-17 DIAGNOSIS — Q21.0 VSD (VENTRICULAR SEPTAL DEFECT AND AORTIC ARCH HYPOPLASIA: ICD-10-CM

## 2022-06-17 DIAGNOSIS — Q24.9 ADULT CONGENITAL HEART DISEASE: ICD-10-CM

## 2022-06-17 DIAGNOSIS — Q25.42 VSD (VENTRICULAR SEPTAL DEFECT AND AORTIC ARCH HYPOPLASIA: ICD-10-CM

## 2022-06-17 DIAGNOSIS — Q22.4 TRICUSPID ATRESIA WITH D-TRANSPOSITION OF GREAT ARTERIES: ICD-10-CM

## 2022-06-17 DIAGNOSIS — I27.29 PULMONARY ARTERIAL HYPERTENSION ASSOCIATED WITH CONGENITAL HEART DISEASE: Primary | ICD-10-CM

## 2022-06-20 ENCOUNTER — PATIENT MESSAGE (OUTPATIENT)
Dept: PEDIATRIC CARDIOLOGY | Facility: CLINIC | Age: 47
End: 2022-06-20
Payer: COMMERCIAL

## 2022-07-11 ENCOUNTER — PATIENT MESSAGE (OUTPATIENT)
Dept: PEDIATRIC CARDIOLOGY | Facility: CLINIC | Age: 47
End: 2022-07-11
Payer: COMMERCIAL

## 2022-08-18 ENCOUNTER — HOSPITAL ENCOUNTER (OUTPATIENT)
Dept: CARDIOLOGY | Facility: HOSPITAL | Age: 47
Discharge: HOME | End: 2022-08-18
Attending: PEDIATRICS
Payer: COMMERCIAL

## 2022-08-18 ENCOUNTER — CLINICAL SUPPORT (OUTPATIENT)
Dept: CARDIOLOGY | Facility: CLINIC | Age: 47
End: 2022-08-18
Attending: PEDIATRICS
Payer: COMMERCIAL

## 2022-08-18 ENCOUNTER — OFFICE VISIT (OUTPATIENT)
Dept: CARDIOLOGY | Facility: CLINIC | Age: 47
End: 2022-08-18
Payer: COMMERCIAL

## 2022-08-18 ENCOUNTER — HOSPITAL ENCOUNTER (OUTPATIENT)
Dept: CARDIOLOGY | Facility: CLINIC | Age: 47
Discharge: HOME | End: 2022-08-18
Payer: COMMERCIAL

## 2022-08-18 ENCOUNTER — TELEPHONE (OUTPATIENT)
Dept: HEPATOLOGY | Facility: CLINIC | Age: 47
End: 2022-08-18
Payer: COMMERCIAL

## 2022-08-18 ENCOUNTER — HOSPITAL ENCOUNTER (OUTPATIENT)
Dept: PEDIATRIC CARDIOLOGY | Facility: HOSPITAL | Age: 47
Discharge: HOME | End: 2022-08-18
Attending: PEDIATRICS
Payer: COMMERCIAL

## 2022-08-18 VITALS
BODY MASS INDEX: 30.16 KG/M2 | OXYGEN SATURATION: 88 % | HEIGHT: 63 IN | BODY MASS INDEX: 29.06 KG/M2 | HEART RATE: 75 BPM | WEIGHT: 170.19 LBS | SYSTOLIC BLOOD PRESSURE: 137 MMHG | SYSTOLIC BLOOD PRESSURE: 137 MMHG | WEIGHT: 164 LBS | DIASTOLIC BLOOD PRESSURE: 67 MMHG | BODY MASS INDEX: 30.16 KG/M2 | HEIGHT: 63 IN | DIASTOLIC BLOOD PRESSURE: 67 MMHG | WEIGHT: 170.19 LBS | HEART RATE: 75 BPM | OXYGEN SATURATION: 88 % | HEIGHT: 63 IN

## 2022-08-18 DIAGNOSIS — Q21.0 VSD (VENTRICULAR SEPTAL DEFECT AND AORTIC ARCH HYPOPLASIA: ICD-10-CM

## 2022-08-18 DIAGNOSIS — Q24.9 PULMONARY ARTERIAL HYPERTENSION ASSOCIATED WITH CONGENITAL HEART DISEASE: ICD-10-CM

## 2022-08-18 DIAGNOSIS — Z98.890 S/P FONTAN PROCEDURE: ICD-10-CM

## 2022-08-18 DIAGNOSIS — Q20.3 TRICUSPID ATRESIA WITH D-TRANSPOSITION OF GREAT ARTERIES: ICD-10-CM

## 2022-08-18 DIAGNOSIS — Q25.42 VSD (VENTRICULAR SEPTAL DEFECT AND AORTIC ARCH HYPOPLASIA: ICD-10-CM

## 2022-08-18 DIAGNOSIS — Q24.9 ADULT CONGENITAL HEART DISEASE: ICD-10-CM

## 2022-08-18 DIAGNOSIS — I27.29 PULMONARY ARTERIAL HYPERTENSION ASSOCIATED WITH CONGENITAL HEART DISEASE: ICD-10-CM

## 2022-08-18 DIAGNOSIS — Q22.4 TRICUSPID ATRESIA WITH D-TRANSPOSITION OF GREAT ARTERIES: Primary | ICD-10-CM

## 2022-08-18 DIAGNOSIS — I48.92 ATRIAL FLUTTER, UNSPECIFIED TYPE: ICD-10-CM

## 2022-08-18 DIAGNOSIS — Q22.4 TRICUSPID ATRESIA WITH D-TRANSPOSITION OF GREAT ARTERIES: ICD-10-CM

## 2022-08-18 DIAGNOSIS — I50.9 HEART FAILURE DUE TO CONGENITAL HEART DISEASE: ICD-10-CM

## 2022-08-18 DIAGNOSIS — Z95.0 PACEMAKER: ICD-10-CM

## 2022-08-18 DIAGNOSIS — Q24.9 HEART FAILURE DUE TO CONGENITAL HEART DISEASE: ICD-10-CM

## 2022-08-18 DIAGNOSIS — I87.2 CHRONIC VENOUS INSUFFICIENCY: ICD-10-CM

## 2022-08-18 DIAGNOSIS — Q20.3 TRICUSPID ATRESIA WITH D-TRANSPOSITION OF GREAT ARTERIES: Primary | ICD-10-CM

## 2022-08-18 DIAGNOSIS — I49.9 VENTRICULAR ARRHYTHMIA: ICD-10-CM

## 2022-08-18 DIAGNOSIS — I49.5 SINUS NODE DYSFUNCTION: ICD-10-CM

## 2022-08-18 LAB
ASCENDING AORTA: 4.06 CM
BSA FOR ECHO PROCEDURE: 1.82 M2
CV ECHO LV RWT: 0.23 CM
DOP CALC LVOT PEAK VEL: 0.91 M/S
DOP CALCLVOT PEAK VEL VTI: 17.19 CM
E/E' RATIO: 7 M/S
ECHO LV POSTERIOR WALL: 0.9 CM (ref 0.6–1.1)
FRACTIONAL SHORTENING: 24 % (ref 28–44)
INTERVENTRICULAR SEPTUM: 0.69 CM (ref 0.6–1.1)
LA MAJOR: 7.52 CM
LA MINOR: 7.09 CM
LA WIDTH: 5.51 CM
LEFT ATRIUM SIZE: 5.24 CM
LEFT ATRIUM VOLUME INDEX MOD: 77.8 ML/M2
LEFT ATRIUM VOLUME INDEX: 100.6 ML/M2
LEFT ATRIUM VOLUME MOD: 138.49 CM3
LEFT ATRIUM VOLUME: 179.12 CM3
LEFT INTERNAL DIMENSION IN SYSTOLE: 5.89 CM (ref 2.1–4)
LEFT VENTRICLE DIASTOLIC VOLUME INDEX: 181.53 ML/M2
LEFT VENTRICLE DIASTOLIC VOLUME: 323.12 ML
LEFT VENTRICLE MASS INDEX: 164 G/M2
LEFT VENTRICLE SYSTOLIC VOLUME INDEX: 96.8 ML/M2
LEFT VENTRICLE SYSTOLIC VOLUME: 172.36 ML
LEFT VENTRICULAR INTERNAL DIMENSION IN DIASTOLE: 7.77 CM (ref 3.5–6)
LEFT VENTRICULAR MASS: 292.57 G
LV LATERAL E/E' RATIO: 4.85 M/S
LV SEPTAL E/E' RATIO: 12.6 M/S
MV PEAK E VEL: 0.63 M/S
PV PEAK VELOCITY: 1.04 CM/S
RA MAJOR: 6.12 CM
RA WIDTH: 4.56 CM
TDI LATERAL: 0.13 M/S
TDI SEPTAL: 0.05 M/S
TDI: 0.09 M/S

## 2022-08-18 PROCEDURE — 4010F PR ACE/ARB THEARPY RXD/TAKEN: ICD-10-PCS | Mod: CPTII,S$GLB,, | Performed by: PEDIATRICS

## 2022-08-18 PROCEDURE — 4010F ACE/ARB THERAPY RXD/TAKEN: CPT | Mod: CPTII,S$GLB,, | Performed by: PEDIATRICS

## 2022-08-18 PROCEDURE — 3078F PR MOST RECENT DIASTOLIC BLOOD PRESSURE < 80 MM HG: ICD-10-PCS | Mod: CPTII,S$GLB,, | Performed by: PEDIATRICS

## 2022-08-18 PROCEDURE — 1159F PR MEDICATION LIST DOCUMENTED IN MEDICAL RECORD: ICD-10-PCS | Mod: CPTII,S$GLB,, | Performed by: PEDIATRICS

## 2022-08-18 PROCEDURE — 3075F PR MOST RECENT SYSTOLIC BLOOD PRESS GE 130-139MM HG: ICD-10-PCS | Mod: CPTII,S$GLB,, | Performed by: PEDIATRICS

## 2022-08-18 PROCEDURE — 93320 ECHO (CUPID ONLY): ICD-10-PCS | Mod: 26,,, | Performed by: PEDIATRICS

## 2022-08-18 PROCEDURE — 93000 ELECTROCARDIOGRAM COMPLETE: CPT | Mod: S$GLB,,, | Performed by: PEDIATRICS

## 2022-08-18 PROCEDURE — 3075F SYST BP GE 130 - 139MM HG: CPT | Mod: CPTII,S$GLB,, | Performed by: PEDIATRICS

## 2022-08-18 PROCEDURE — 99999 PR PBB SHADOW E&M-EST. PATIENT-LVL I: CPT | Mod: PBBFAC,,,

## 2022-08-18 PROCEDURE — 99999 PR PBB SHADOW E&M-EST. PATIENT-LVL V: CPT | Mod: PBBFAC,,, | Performed by: PEDIATRICS

## 2022-08-18 PROCEDURE — 93280 CV PACEMAKER PROGRAMMING PEDIATRICS (CUPID ONLY): ICD-10-PCS | Mod: S$GLB,,, | Performed by: PEDIATRICS

## 2022-08-18 PROCEDURE — 3008F BODY MASS INDEX DOCD: CPT | Mod: CPTII,S$GLB,, | Performed by: PEDIATRICS

## 2022-08-18 PROCEDURE — 99999 PR PBB SHADOW E&M-EST. PATIENT-LVL V: ICD-10-PCS | Mod: PBBFAC,,, | Performed by: PEDIATRICS

## 2022-08-18 PROCEDURE — 3078F DIAST BP <80 MM HG: CPT | Mod: CPTII,S$GLB,, | Performed by: PEDIATRICS

## 2022-08-18 PROCEDURE — 99999 PR PBB SHADOW E&M-EST. PATIENT-LVL III: ICD-10-PCS | Mod: PBBFAC,,, | Performed by: PEDIATRICS

## 2022-08-18 PROCEDURE — 99214 OFFICE O/P EST MOD 30 MIN: CPT | Mod: S$GLB,,, | Performed by: PEDIATRICS

## 2022-08-18 PROCEDURE — 93325 DOPPLER ECHO COLOR FLOW MAPG: CPT

## 2022-08-18 PROCEDURE — 3008F PR BODY MASS INDEX (BMI) DOCUMENTED: ICD-10-PCS | Mod: CPTII,S$GLB,, | Performed by: PEDIATRICS

## 2022-08-18 PROCEDURE — 99215 OFFICE O/P EST HI 40 MIN: CPT | Mod: 25,S$GLB,, | Performed by: PEDIATRICS

## 2022-08-18 PROCEDURE — 93242 EXT ECG>48HR<7D RECORDING: CPT

## 2022-08-18 PROCEDURE — 93325 DOPPLER ECHO COLOR FLOW MAPG: CPT | Mod: 26,,, | Performed by: PEDIATRICS

## 2022-08-18 PROCEDURE — 99999 PR PBB SHADOW E&M-EST. PATIENT-LVL I: ICD-10-PCS | Mod: PBBFAC,,,

## 2022-08-18 PROCEDURE — 93244 EXT ECG>48HR<7D REV&INTERPJ: CPT | Mod: ,,, | Performed by: PEDIATRICS

## 2022-08-18 PROCEDURE — 93320 DOPPLER ECHO COMPLETE: CPT | Mod: 26,,, | Performed by: PEDIATRICS

## 2022-08-18 PROCEDURE — 99215 PR OFFICE/OUTPT VISIT, EST, LEVL V, 40-54 MIN: ICD-10-PCS | Mod: 25,S$GLB,, | Performed by: PEDIATRICS

## 2022-08-18 PROCEDURE — 99999 PR PBB SHADOW E&M-EST. PATIENT-LVL III: CPT | Mod: PBBFAC,,, | Performed by: PEDIATRICS

## 2022-08-18 PROCEDURE — 93303 ECHO TRANSTHORACIC: CPT | Mod: 26,,, | Performed by: PEDIATRICS

## 2022-08-18 PROCEDURE — 93280 PM DEVICE PROGR EVAL DUAL: CPT | Mod: S$GLB,,, | Performed by: PEDIATRICS

## 2022-08-18 PROCEDURE — 93303 ECHO (CUPID ONLY): ICD-10-PCS | Mod: 26,,, | Performed by: PEDIATRICS

## 2022-08-18 PROCEDURE — 93325 ECHO (CUPID ONLY): ICD-10-PCS | Mod: 26,,, | Performed by: PEDIATRICS

## 2022-08-18 PROCEDURE — 1159F MED LIST DOCD IN RCRD: CPT | Mod: CPTII,S$GLB,, | Performed by: PEDIATRICS

## 2022-08-18 PROCEDURE — 93244 CV 3-14 DAY PEDIATRIC HOLTER MONITOR (CUPID ONLY): ICD-10-PCS | Mod: ,,, | Performed by: PEDIATRICS

## 2022-08-18 PROCEDURE — 99214 PR OFFICE/OUTPT VISIT, EST, LEVL IV, 30-39 MIN: ICD-10-PCS | Mod: S$GLB,,, | Performed by: PEDIATRICS

## 2022-08-18 PROCEDURE — 93000 EKG 12-LEAD: ICD-10-PCS | Mod: S$GLB,,, | Performed by: PEDIATRICS

## 2022-08-18 NOTE — PROGRESS NOTES
"Name: Marcello Briggs  MRN: [id number]  : 1975      Subjective:   CC: Single Ventricular / Fontan Physiology    HPI:    Marcello Briggs is a 47 y.o. male who presents to [facility] at [facility] for follow-up regarding hx of TGA c Tri-Atresia s/p lateral tunnel Fontan, sinus node dysfunction s/p pacemaker, and hx of IART and NS-VT.   He was last seen by my colleague Dr. Reyez on 10/22/2020. Since that time, he has done pretty well.  His only complaint is foot pain which is worse at the end of the day.  Right foot much worse than the left foot.  He has prominent varicosities in that right leg.  He tells me he has been seen in the past by a varicose vein doctor who did not think intervention was necessary.  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  Otherwise, he has no complaints.  No shortness of breath, chest pain, palpitations, syncope, near syncope, cyanosis, or edema.  No diarrhea.  No productive cough.    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  Cardiac/Surgical Hx  History of a classic Roger shunt and a 12 mm conduit from the right atrium to the left pulmonary artery along with a Damus Judy Stansel operation   now with mild native and brigette-aortic insufficiency  lateral tunnel Fontan with a 16 mm ring Goretex graft from the left pulmonary artery to the right pulmonary artery.  elevated central venous pressures with mild desaturation at least partially due to collateral vessels - improved after cath  but still an issue.  mild mitral regurgitation   Mildly decreased systemic ventricular function  Related:  Last liver US 2020  No evidence of protein-losing enteropathy.  Recurrent intra-atrial reentrant tachycardia   arrhythmias typically manifest as "back pain"  Sinus node dysfunction   most recent pacemaker/ICD change 10/08/2020  Nonsustained VT  Noninvasive EP " study (NIEPS) negative 2016  Varicose veins   followed in the past by vascular Medicine.    Improved pain and swelling.    Of note, the patient was accessed via the right femoral vein without difficulty at the 2010 catheterization.  Obstructive sleep apnea      Family Hx:  No known family history of congenital heart defects or cardiac surgeries in childhood.  No known family members with pacemakers or defibrillators.  No known inherited channelopathies or cardiomyopathies.  No known hx of sudden cardiac death or heart transplant.  No No known heart attack in someone less than 50yoa.    Social Hx:  Lives in [place], [place].    Review of Systems:  See HPI  ALL: See below.    Medications & Allergy:  Current Outpatient Medications on File Prior to Visit   Medication Sig Dispense Refill    digoxin (LANOXIN) 250 mcg tablet Take 1 tablet (0.25 mg total) by mouth every morning. 30 tablet 3    enalapril (VASOTEC) 10 MG tablet Take 1 tablet (10 mg total) by mouth 2 (two) times daily. 60 tablet 3    furosemide (LASIX) 20 MG tablet TAKE 1 TABLET (20 MG TOTAL) BY MOUTH ONCE DAILY. 60 tablet 6    metoprolol succinate (TOPROL-XL) 100 MG 24 hr tablet TAKE 3 TABLETS (300 MG TOTAL) BY MOUTH ONCE DAILY. 270 tablet 3    sotaloL (BETAPACE) 240 MG Tab TAKE 1 TABLET (240 MG TOTAL) BY MOUTH 2 (TWO) TIMES DAILY. 180 tablet 3    warfarin (COUMADIN) 2 MG tablet Take 10 mg (6mg + 2 (2mg) tabs every Saturday and 8mg (6mg +2 mg) all other days as directed 35 tablet 3    warfarin (COUMADIN) 6 MG tablet TAKE 10MG (6MG + 2 (2MG) TABS EVERY SATURDAY AND 8MG (6MG + 2MG) TABS ALL OTHER DAYS AS DIRECTED 30 tablet 3    [DISCONTINUED] sildenafil (REVATIO) 20 mg Tab Take 1 tablet (20 mg total) by mouth 2 (two) times daily. 60 tablet 3     No current facility-administered medications on file prior to visit.       Review of patient's allergies indicates:  No Known Allergies       Objective:   Vitals:  Vitals:    08/18/22 0933 08/18/22 0934   BP: 135/65  "137/67   BP Location: Right arm Left arm   Patient Position: Sitting Sitting   BP Method: Large (Automatic) Large (Automatic)   Pulse: 75 75   SpO2: (!) 88%    Weight: 77.2 kg (170 lb 3.1 oz)    Height: 5' 3" (1.6 m)      Body mass index is 30.15 kg/m².  Body surface area is 1.85 meters squared.    Exam:  GEN: No acute distress, Normal appearing  EYE: Anicteric sclerae  ENT: No drainage, Moist mucous membranes  PULM: Normal work of breathing;  Clear to auscultation bilaterally, Good air movement throughout.  CV: No chest pain;   Single S2,   I/VI systolic murmur;   No rubs or gallops;  EXT: No cyanosis, Mild RLE edema.   2+ radial and dorsalis pedis pulses bilaterally  ABD: Soft, Non-distended, Non-tender,    Liver edge 3cm below RCM;  Normal bowel sounds  DERM: No rashes  NEUR: Normal gait, Grossly normal tone.  PSY: Normal mood and affect      Results / Data:   ECG:   (08/18/2022) - Atrial paced rhythm, intraventricular conduction delay.  (10/22/2020) - Atrial paced rhythm, intraventricular conduction delay.    Holter/Zio:   (08/18/2022)  Pending, placed today.    (09/18/2020)  Predominant rhythm is AV sequential pacing  Rare atrial/ventricular ectopy  20 episodes of slow SVT (longest 9 beats fastest 133 bpm)  One 4 beat episode of slow VT (max rate 156 bpm) and one ventricular triplet  No diary symptoms    (9/18/20)  Predominant rhythm is AV sequential pacing  Rare atrial/ventricular ectopy  20 episodes of slow SVT (longest 9 beats fastest 133 bpm)  One 4 beat episode of slow VT (max rate 156 bpm) and one ventricular triplet  No diary symptoms    Echocardiogram:   (08/18/2022)  CONGENITAL CARDIAC HISTORY:  Tricuspid atresia, hypoplastic right ventricle with ventricular septal defect and transposition of the great arteries.  INTERVENTION:  S/P Jbugo-Iwsm-Vubfaxs and lateral tunnel Fontan.  S/P Coil and Amplatzer occlusion of venovenous collaterals (detailed description of anatomy in cath report July 2010).   "   SEGMENTAL CARDIAC CONNECTIONS:  Abdominal situs is solitus.   Atrial situs is normal.   Tricuspid atresia.   Abnormal mitral valve with tricuspid atresia.   Very dilated, globular left ventricle with very hypoplastic right ventricle.   D-transposed great vessels.   Aortic valve is normal and pulmonic valve is normal. D-loop.   Cardiac position is Levocardia.     IMPRESSION  Technically difficult imaging -  Images consistent with tricuspid atresia, d-TGA, VSD and RV hypoplasia S/P DKS and lateral tunnel Fontan.  Qualitative impression of improved LV function compared to previous study with no other significant changes noted.  Normal intrahepatic inferior vena cava and hepatic veins joining lateral tunnel Fontan and slow moving spontaneous contrast, laminar flow and no obvious thrombus in IVC, hepatic veins, proximal lateral tunnel or SVC.  Unable to demonstrate remaining segments of Fontan circuit and pulmonary arteries with very limited suprasternal windows.  Small functional right atrium.  Unrestricted bidirectional flow at atrial septum.  The left atrial volume index is severely enlarged measuring 101 ml/m2.   Large mitral annulus.  At least mild mitral valve insufficiency.  Dilated left ventricle - severe.  Qualitatively lborderline left (single) ventricle systolic function with EF estimated 45 -50% from apical views.  Limited images suggest unrestricted flow at VSD to anterior aorta and small subaortic chamber (Right ventricle).  Anterior native aortic valve with no stenosis and trivial to mild insufficiency.  Posteriorly positioned trileaflet pulmonary valve committed to the left ventricle with no left ventricular outflow obstruction, trivial to mild insufficiency and no valvar stenosis.  Good imaging of the DKS anastomosis with no evidence of obstruction in either limb.  No pericardial effusion.    (07/27/2020)  CONGENITAL CARDIAC HISTORY:  Tricuspid atresia, hypoplastic right ventricle with ventricular  septal defect and transposition of the great arteries.   INTERVENTION:   S/P Xhwhx-Kjup-Sypmuhb and lateral tunnel Fontan.  S/P Coil and Amplatzer occlusion of venovenous collaterals (detailed description of anatomy in cath report July 2010).     SEGMENTAL CARDIAC CONNECTIONS:    Abdominal situs is solitus. Atrial situs is normal. Tricuspid atresia. Abnormal mitral valve with tricuspid atresia. Very dilated, globular left ventricle with very hypoplastic right ventricle. D-transposed great vessels. Aortic valve is normal and pulmonic valve is normal. D-loop. Cardiac position is Levocardia.     IMPRESSION -   Technically difficult imaging -  Images consistent with tricuspid atresia, d-TGA, VSD and RV hypoplasia S/P DKS and lateral tunnel Fontan.  Qualitative impression of mildly decreased LV function compared to previous study with no other significant changes noted.  Dilated inferior vena cava and hepatic veins joining very dilated lateral tunnel Fontan and slow moving spontaneous contrast, laminar flow and no obvious thrombus in IVC, hepatic veins, proximal lateral tunnel or SVC.  Unable to demonstrate remaining segments of Fontan circuit and pulmonary arteries with very limited suprasternal windows.  Small functional right atrium.  Unrestricted bidirectional flow at atrial septum.   At least moderate left atrial enlargement.   Large mitral annulus.  Mild to moderate  mitral valve insufficiency.   Dilated left ventricle - severe.   Qualitatively reduced left (single) ventricle systolic function with SF = 25% and EF estimated 40 -45% from apical views.   Unrestricted flow at VSD to anterior aorta and small subaortic chamber (Right ventricle).   Anterior native aortic valve with no stenosis and mild insufficiency.   Posteriorly positioned trileaflet pulmonary valve committed to the left ventricle with no left ventricular outflow obstruction, mild insufficiency and no valvar stenosis.   Good imaging of the DKS  anastomosis with no evidence of obstruction in either limb.    No pericardial effusion.    Pacemaker Interrogation:  (08/18/2022)  Permanent Programming   RA Lead: 2.0 V @ 1 ms. Sensitivity: 2 mV.   RV Lead: 1.375 Auto V @ 0.5 ms. Sensitivity: 2 mV.     Pacemaker Generator and Leads meet standard of FDA approval.     Chamber type: dual.   Mode: DDDR   Lower limit rate: 75 bpm   Upper tracking rate: 130 bpm     AV Delay  Fixed: 200 msec     PV Delay  Fixed: 200 msec    Battery voltage: 2.99 V  Estimated longevity: 3.9 - 4.8 years .   Magnet Rate: 100 ppm    Leads  RA Lead:        P/R-wave: (none)Paced mV       Lead Impedance: 340 Ohms       Paced: 82%   RV Lead:        Impedance: 350 Ohms       Paced: 78%     Thresholds  RA Lead: 1 V @ 1 ms. Configuration: unipolar.   RV Lead: 1.25 V @ 0.5 ms. Configuration: bipolar.    Reprogramming comments:  No changes this session     General comments:   Device interrogation and lead testing performed. Device and leads WNL.    HVR x 16. Longest 10 minutes 24 seconds;  Available EGM's show VS episodes; Minimal data on atrial EGM - occasional AP or noise artifact noted.       Catheterization: (July 7, 2010)   1. Complex univentricular cardiac defect with superior inferior ventricles with double inlet left ventricle (atrioventricular connection not defined at catheterization), status post Rkvdq-Mpog-Ikuldgc connection and intracardiac lateral tunnel Fontan.   2. Moderate neoaortic (pulmonary) valve insufficiency.   3. Left aorta.   4. 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   5. Elevated central venous pressure (mean 19), transpulmonary gradient 4-5 mmHg.    Assessment / Plan:   Marcello Briggs is a 47 y.o. male who presents to [facility] at [facility] for follow-up regarding hx of TGA c Tri-Atresia s/p lateral tunnel Fontan, sinus node dysfunction s/p pacemaker, and hx of IART and NS-VT.      Follow-up:   3 months remote  transmission  1 year in clinic interrogation with ECG, echocardiogram, and 24hr heart rhythm monitor  Cardiac medications:    Digoxin  Enalapril  Lasix  Metoprolol XL  Sotalol  Warfarin    Please contact us if he has any questions or concerns.  Our clinic from his [phone] during office hours. For urgent night and weekend concerns, call [phone] and ask for the pediatric cardiologist on call to be paged.

## 2022-08-18 NOTE — PROGRESS NOTES
"08/18/2022    Patient Name: LEVON GANDHI  YOB: 1975  [facility] Number: [id number]    Kevin Chaves MD  [address]  [place] [address]    MIRIAM Garcia MD  [address] [facility]   [address]  [place] [address]    Dear Dr. Chaves and Jose:    It is a pleasure to see Levon at our main campus adult congenital cardiology clinic to evaluate tricuspid atresia.    Levon is a 47 y.o. Male with a complex past medical history. To summarize, his diagnoses are as follows:  1. 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   - History of a classic Roger shunt and a 12 mm conduit from the right atrium to the left pulmonary artery along with a Damus Judy Stansel operation    - now with mild native and brigette-aortic insufficiency   - lateral tunnel Fontan with a 16 mm ring Goretex graft from the left pulmonary artery to the right pulmonary artery.   - elevated central venous pressures with mild desaturation at least partially due to collateral vessels - improved after cath 2010 but still an issue.   - mild mitral regurgitation    - Last liver US September 2020  2. 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     - arrhythmias typically manifest as "back pain"   - nonsustained VT, Noninvasive EP study (NIEPS) negative 2016  3. Varicose veins followed in the past by vascular Medicine.  Improved pain and swelling.  Of note, the patient was accessed via the right femoral vein without difficulty at the 2010 catheterization.  4. No evidence of protein-losing enteropathy.    5. obstructive sleep apnea  6.  Pacemaker ending near end of life  7.  Mildly decreased systemic ventricular function    Discussion:  Overall, he looks good.  It has been 12 years since his last catheterization, and we can not MRI him because of the epicardial pacemaker.  I would " like to repeat a diagnostic catheterization, and there is a good chance that he will have arterial collateral vessels coiled off.  He is at high risk for cirrhosis and increased risk for hepatocellular carcinoma, and I have referred him to the hepatology team in [place].  I am going to check baseline blood work on him today.  His major complaint is lower extremity pain, and I will likely refer him to vascular Medicine.  First I want to get the catheterization, and then I can refer him.    My plan is as follows:  1.  Continue to treat obstructive sleep apnea  2.  Continue current medications  3.  At a minimum, follow-up with me in 1 year with echo , routine labs, and EKG.  Regular follow-up with electrophysiology.  4.  referral to hepatology in [place].  5.  Repeat cardiac catheterization.  6. Likely referral to vascular Medicine after his catheterization.    Interval history:   I last saw him in clinic almost 2 years ago.  Overall, he has done pretty well since that time.  His only complaint is foot pain which is worse at the end of the day.  Right foot much worse than the left foot.  He has prominent varicosities in that right leg.  He tells me he has been seen in the past by a varicose vein doctor who did not think intervention was necessary.  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  Otherwise, he has no complaints.  No shortness of breath, chest pain, palpitations, syncope, near syncope, cyanosis, or edema.  No diarrhea.  No productive cough.    On July 7, 2010 he underwent a cardiac catheterization:   1. Complex univentricular cardiac defect with superior inferior ventricles with double inlet left ventricle (atrioventricular connection not defined at catheterization), status post Hicrd-Ydrc-Ncbgbfz connection and intracardiac lateral tunnel Fontan.   2. Moderate neoaortic (pulmonary) valve insufficiency.   3. Left aorta.   4. Multiple systemic venous to pulmonary venous collaterals  occluded.   5. Elevated central venous pressure (mean 19), transpulmonary gradient 4-5 mmHg.    Past Medical History:   Diagnosis Date    Asthma     Atrial flutter     Cyanosis     Heart murmur     Intra-atrial reentrant tachycardia     MIGUEL (obstructive sleep apnea) 10/23/2014    TGA (transposition of great arteries)     Tricuspid atresia     VSD (ventricular septal defect)      Past Surgical History:   Procedure Laterality Date    CARDIAC CATHETERIZATION      FONTAN PROCEDURE, EXTRACARDIAC      REPLACEMENT OF PACEMAKER Right 10/8/2020    Procedure: REPLACEMENT-PACEMAKER GENERATOR;  Surgeon: Chang Garcia MD;  Location: [facility] OR [address] [place] [address];  Service: Cardiovascular;  Laterality: Right;     Family History   Problem Relation Age of Onset    No Known Problems Mother     No Known Problems Father     No Known Problems Sister     No Known Problems Brother     No Known Problems Maternal Grandmother     Heart disease Maternal Grandfather     No Known Problems Paternal Grandmother     Diabetes Paternal Grandfather     No Known Problems Maternal Aunt     No Known Problems Maternal Uncle     Diabetes Paternal Aunt     No Known Problems Paternal Uncle     Atrial Septal Defect Neg Hx     Anemia Neg Hx     Arrhythmia Neg Hx     Asthma Neg Hx     Clotting disorder Neg Hx     Fainting Neg Hx     Heart attack Neg Hx     Heart failure Neg Hx     Hyperlipidemia Neg Hx     Hypertension Neg Hx     Stroke Neg Hx      Social History     Socioeconomic History    Marital status: Legally    Tobacco Use    Smoking status: Never Smoker    Smokeless tobacco: Never Used   Substance and Sexual Activity    Alcohol use: No    Drug use: No   Social History Narrative    He is working at [employer].     Current Outpatient Medications on File Prior to Visit   Medication Sig Dispense Refill    digoxin (LANOXIN) 250 mcg tablet Take 1 tablet (0.25 mg total) by mouth every morning. 30 tablet 3     "enalapril (VASOTEC) 10 MG tablet Take 1 tablet (10 mg total) by mouth 2 (two) times daily. 60 tablet 3    furosemide (LASIX) 20 MG tablet TAKE 1 TABLET (20 MG TOTAL) BY MOUTH ONCE DAILY. 60 tablet 6    metoprolol succinate (TOPROL-XL) 100 MG 24 hr tablet TAKE 3 TABLETS (300 MG TOTAL) BY MOUTH ONCE DAILY. 270 tablet 3    sotaloL (BETAPACE) 240 MG Tab TAKE 1 TABLET (240 MG TOTAL) BY MOUTH 2 (TWO) TIMES DAILY. 180 tablet 3    warfarin (COUMADIN) 2 MG tablet Take 10 mg (6mg + 2 (2mg) tabs every Saturday and 8mg (6mg +2 mg) all other days as directed 35 tablet 3    warfarin (COUMADIN) 6 MG tablet TAKE 10MG (6MG + 2 (2MG) TABS EVERY SATURDAY AND 8MG (6MG + 2MG) TABS ALL OTHER DAYS AS DIRECTED 30 tablet 3    [DISCONTINUED] sildenafil (REVATIO) 20 mg Tab Take 1 tablet (20 mg total) by mouth 2 (two) times daily. 60 tablet 3     No current facility-administered medications on file prior to visit.     Review of patient's allergies indicates:  No Known Allergies     Vitals:    08/18/22 0926 08/18/22 0931   BP: 135/65 137/67   BP Location: Right arm Left arm   Patient Position: Sitting Sitting   BP Method: Large (Automatic) Large (Automatic)   Pulse: 75 75   SpO2: (!) 88%    Weight: 77.2 kg (170 lb 3.1 oz)    Height: 5' 3" (1.6 m)      /67 (BP Location: Left arm, Patient Position: Sitting, BP Method: Large (Automatic))   Pulse 75   Ht 5' 3" (1.6 m)   Wt 77.2 kg (170 lb 3.1 oz)   SpO2 (!) 88%   BMI 30.15 kg/m²     In general, his baseline ruddiness is stable.  He is a white male in no apparent distress. Head is normocephalic and atraumatic. The eyes, nares, and oropharynx are clear. The neck is supple without obvious jugular venous distention or lymphadenopathy, and his face does not look swollen. Lungs are clear to auscultation bilaterally. The chest is symmetrical. Multiple well-healed surgical scars are noted. The pacemaker is palpated in the right upper chest. The area is nontender without evidence of " infection. The precordium is quiet. First heart sound is normal. A loud single second heart sound is auscultated. A grade 1/6 systolic ejection murmur is auscultated. No diastolic murmurs or gallops are heard. The abdominal exam reveals a liver edge palpated about 3 cm below the right costal margin. It is not pulsatile. He abdomen is soft and nontender without evidence of ascites. I could not palpate his spleen. There is no significant clubbing and no obvious cyanosis. He has good pulses in his legs bilaterally. There is trivial edema in the mild pitting edema in the right leg up to about the mid shin. He does have varicose veins.  No palpable cords or calf tenderness.    I personally reviewed the following studies:  Results for orders placed during the hospital encounter of 08/18/22  Echo  IMPRESSION -  Technically difficult imaging -  Images consistent with tricuspid atresia, d-TGA, VSD and RV hypoplasia S/P DKS and lateral tunnel Fontan.  Qualitative impression of improved LV function compared to previous study with no other significant changes noted.  Normal intrahepatic inferior vena cava and hepatic veins joining lateral tunnel Fontan and slow moving spontaneous contrast, laminar flow and no obvious thrombus in IVC, hepatic veins, proximal lateral tunnel or SVC.  Unable to demonstrate remaining segments of Fontan circuit and pulmonary arteries with very limited suprasternal windows.  Small functional right atrium.  Unrestricted bidirectional flow at atrial septum.  The left atrial volume index is severely enlarged measuring 101 ml/m2.  Large mitral annulus.  At least mild mitral valve insufficiency.  Dilated left ventricle - severe.  Qualitatively borderline left (single) ventricle systolic function with EF estimated 45 -50% from apical views.  Limited images suggest unrestricted flow at VSD to anterior aorta and small subaortic chamber (Right ventricle).  Anterior native aortic valve with no stenosis and  trivial to mild insufficiency.  Posteriorly positioned trileaflet pulmonary valve committed to the left ventricle with no left ventricular outflow obstruction, trivial to mild insufficiency and no valvar stenosis.  Good imaging of the DKS anastomosis with no evidence of obstruction in either limb.  No pericardial effusion.    EKG with atrial pacing.    Abdominal US 9/10/20:  Hepatic cirrhosis and splenomegaly.    LE US 9/10/20:  · No evidence of right lower extremity DVT.  · No evidence of left lower extremity DVT.  · Right greater saphenous superficial vein reflux is present.  · Right common and deep femoral vein reflux is present.    Thank you for referring this patient to our clinic. Please call with any questions.    Sincerely,        Dereje Acosta MD  Pediatric Cardiology  Adult Congenital Heart Disease  Pediatric Heart Failure and Transplantation  [facility] [address] [place]  [address]  [phone]

## 2022-08-18 NOTE — TELEPHONE ENCOUNTER
----- Message from Emily Elizondo MD sent at 8/18/2022 10:34 AM CDT -----  Regarding: RE: Fontan referral  Yes we'd be happy to see him. Will get him scheduled. If he's interested we could also see him on a Thursday in my virtual visit clinic. Whichever he prefers. I will handle the imaging. Thank you  ----- Message -----  From: Dereje Acosta MD  Sent: 8/18/2022  10:14 AM CDT  To: Emily Elizondo MD, Mikhail STERN Staff  Subject: Fontan referral                                  Can you guys wee him in clinic?  Fontan, high risk of cirrhosis.  Has a pacemaker that doesn't allow MRI.  I am sending lots of labs today.  I haven't imaged liver in a few years and can let you guys decide on imaging.

## 2022-08-18 NOTE — TELEPHONE ENCOUNTER
Spoke with patient who states that he was currently getting lab work completed. He will give me a call back once he is able to schedule appointment. Provided patient with call back number.

## 2022-08-21 LAB
AV DELAY - FIXED: 200 MSEC
BATTERY VOLTAGE (V): 2.99 V
IMPEDANCE RA LEAD (NATIVE): 350 OHMS
IMPEDANCE RA LEAD: 340 OHMS
OHS CV DC PP MS1: 1 MS
OHS CV DC PP MS2: 0.5 MS
OHS CV DC PP V1: 2 V
OHS CV DC PP V2: NORMAL V
PV DELAY - FIXED: 200 MSEC
THRESHOLD MS RA LEAD (NATIVE): 0.5 MS
THRESHOLD MS RA LEAD: 1 MS
THRESHOLD V RA LEAD (NATIVE): 1.25 V
THRESHOLD V RA LEAD: 1 V

## 2022-08-23 ENCOUNTER — PATIENT MESSAGE (OUTPATIENT)
Dept: CARDIOLOGY | Facility: CLINIC | Age: 47
End: 2022-08-23
Payer: COMMERCIAL

## 2022-08-24 ENCOUNTER — LAB VISIT (OUTPATIENT)
Dept: LAB | Facility: HOSPITAL | Age: 47
End: 2022-08-24
Attending: INTERNAL MEDICINE
Payer: COMMERCIAL

## 2022-08-24 ENCOUNTER — OFFICE VISIT (OUTPATIENT)
Dept: HEPATOLOGY | Facility: CLINIC | Age: 47
End: 2022-08-24
Payer: COMMERCIAL

## 2022-08-24 VITALS
HEART RATE: 70 BPM | BODY MASS INDEX: 29.8 KG/M2 | SYSTOLIC BLOOD PRESSURE: 122 MMHG | HEIGHT: 63 IN | DIASTOLIC BLOOD PRESSURE: 66 MMHG | WEIGHT: 168.19 LBS

## 2022-08-24 DIAGNOSIS — R17 ELEVATED BILIRUBIN: ICD-10-CM

## 2022-08-24 DIAGNOSIS — Z98.890 S/P FONTAN PROCEDURE: ICD-10-CM

## 2022-08-24 DIAGNOSIS — Q22.4 TRICUSPID ATRESIA WITH D-TRANSPOSITION OF GREAT ARTERIES: ICD-10-CM

## 2022-08-24 DIAGNOSIS — R17 ELEVATED BILIRUBIN: Primary | ICD-10-CM

## 2022-08-24 DIAGNOSIS — Q20.3 TRICUSPID ATRESIA WITH D-TRANSPOSITION OF GREAT ARTERIES: ICD-10-CM

## 2022-08-24 DIAGNOSIS — D69.6 THROMBOCYTOPENIA: ICD-10-CM

## 2022-08-24 PROCEDURE — 3078F DIAST BP <80 MM HG: CPT | Mod: CPTII,S$GLB,, | Performed by: INTERNAL MEDICINE

## 2022-08-24 PROCEDURE — 99204 PR OFFICE/OUTPT VISIT, NEW, LEVL IV, 45-59 MIN: ICD-10-PCS | Mod: S$GLB,,, | Performed by: INTERNAL MEDICINE

## 2022-08-24 PROCEDURE — 99999 PR PBB SHADOW E&M-EST. PATIENT-LVL V: ICD-10-PCS | Mod: PBBFAC,,, | Performed by: INTERNAL MEDICINE

## 2022-08-24 PROCEDURE — 4010F ACE/ARB THERAPY RXD/TAKEN: CPT | Mod: CPTII,S$GLB,, | Performed by: INTERNAL MEDICINE

## 2022-08-24 PROCEDURE — 36415 COLL VENOUS BLD VENIPUNCTURE: CPT | Performed by: INTERNAL MEDICINE

## 2022-08-24 PROCEDURE — 4010F PR ACE/ARB THEARPY RXD/TAKEN: ICD-10-PCS | Mod: CPTII,S$GLB,, | Performed by: INTERNAL MEDICINE

## 2022-08-24 PROCEDURE — 86790 VIRUS ANTIBODY NOS: CPT | Performed by: INTERNAL MEDICINE

## 2022-08-24 PROCEDURE — 87340 HEPATITIS B SURFACE AG IA: CPT | Performed by: INTERNAL MEDICINE

## 2022-08-24 PROCEDURE — 99204 OFFICE O/P NEW MOD 45 MIN: CPT | Mod: S$GLB,,, | Performed by: INTERNAL MEDICINE

## 2022-08-24 PROCEDURE — 1159F PR MEDICATION LIST DOCUMENTED IN MEDICAL RECORD: ICD-10-PCS | Mod: CPTII,S$GLB,, | Performed by: INTERNAL MEDICINE

## 2022-08-24 PROCEDURE — 3078F PR MOST RECENT DIASTOLIC BLOOD PRESSURE < 80 MM HG: ICD-10-PCS | Mod: CPTII,S$GLB,, | Performed by: INTERNAL MEDICINE

## 2022-08-24 PROCEDURE — 3074F SYST BP LT 130 MM HG: CPT | Mod: CPTII,S$GLB,, | Performed by: INTERNAL MEDICINE

## 2022-08-24 PROCEDURE — 86706 HEP B SURFACE ANTIBODY: CPT | Performed by: INTERNAL MEDICINE

## 2022-08-24 PROCEDURE — 1159F MED LIST DOCD IN RCRD: CPT | Mod: CPTII,S$GLB,, | Performed by: INTERNAL MEDICINE

## 2022-08-24 PROCEDURE — 1160F PR REVIEW ALL MEDS BY PRESCRIBER/CLIN PHARMACIST DOCUMENTED: ICD-10-PCS | Mod: CPTII,S$GLB,, | Performed by: INTERNAL MEDICINE

## 2022-08-24 PROCEDURE — 3008F PR BODY MASS INDEX (BMI) DOCUMENTED: ICD-10-PCS | Mod: CPTII,S$GLB,, | Performed by: INTERNAL MEDICINE

## 2022-08-24 PROCEDURE — 1160F RVW MEDS BY RX/DR IN RCRD: CPT | Mod: CPTII,S$GLB,, | Performed by: INTERNAL MEDICINE

## 2022-08-24 PROCEDURE — 99999 PR PBB SHADOW E&M-EST. PATIENT-LVL V: CPT | Mod: PBBFAC,,, | Performed by: INTERNAL MEDICINE

## 2022-08-24 PROCEDURE — 3074F PR MOST RECENT SYSTOLIC BLOOD PRESSURE < 130 MM HG: ICD-10-PCS | Mod: CPTII,S$GLB,, | Performed by: INTERNAL MEDICINE

## 2022-08-24 PROCEDURE — 86803 HEPATITIS C AB TEST: CPT | Performed by: INTERNAL MEDICINE

## 2022-08-24 PROCEDURE — 3008F BODY MASS INDEX DOCD: CPT | Mod: CPTII,S$GLB,, | Performed by: INTERNAL MEDICINE

## 2022-08-24 NOTE — PROGRESS NOTES
Subjective:     Marcello Briggs is here for evaluation of Abnormal LFTs      HPI  Marcello Briggs is here for liver evaluation given his h/o congenital heart disease. He reports he has been feeling well only occasional back pain. Seems from a  CV perspective he has been stable.    LFTs mainly normal except for isolated bili elevation.    Review of Systems   Constitutional: Negative.    HENT: Negative.    Eyes: Negative.    Respiratory: Negative.    Cardiovascular: Positive for leg swelling (improved).   Gastrointestinal: Negative.    Genitourinary: Negative.    Musculoskeletal: Positive for back pain.   Skin: Negative.    Neurological: Negative.    Psychiatric/Behavioral: Negative.        Objective:     Physical Exam  Vitals reviewed.   Constitutional:       General: He is not in acute distress.     Appearance: He is well-developed.   HENT:      Head: Normocephalic and atraumatic.      Mouth/Throat:      Pharynx: No oropharyngeal exudate.   Eyes:      General: No scleral icterus.        Right eye: No discharge.         Left eye: No discharge.      Conjunctiva/sclera: Conjunctivae normal.      Pupils: Pupils are equal, round, and reactive to light.   Pulmonary:      Effort: Pulmonary effort is normal. No respiratory distress.      Breath sounds: Normal breath sounds. No wheezing.   Abdominal:      General: There is no distension.      Palpations: Abdomen is soft.      Tenderness: There is no abdominal tenderness.   Musculoskeletal:      Right lower leg: Edema (trace) present.      Left lower leg: Edema (trace) present.   Skin:     Comments: Prominent RLE varicosities   Neurological:      Mental Status: He is alert and oriented to person, place, and time.   Psychiatric:         Behavior: Behavior normal.         Computed MELD-Na score unavailable. Necessary lab results were not found in the last year.  Computed MELD score unavailable. Necessary lab results were not found in the last year.    WBC   Date Value Ref Range  "Status   08/18/2022 4.91 3.90 - 12.70 K/uL Final     Hemoglobin   Date Value Ref Range Status   08/18/2022 17.6 14.0 - 18.0 g/dL Final     Hematocrit   Date Value Ref Range Status   08/18/2022 52.2 40.0 - 54.0 % Final     Platelets   Date Value Ref Range Status   08/18/2022 87 (L) 150 - 450 K/uL Final     BUN   Date Value Ref Range Status   08/18/2022 16 6 - 20 mg/dL Final     Creatinine   Date Value Ref Range Status   08/18/2022 0.8 0.5 - 1.4 mg/dL Final     Glucose   Date Value Ref Range Status   08/18/2022 74 70 - 110 mg/dL Final     Calcium   Date Value Ref Range Status   08/18/2022 9.5 8.7 - 10.5 mg/dL Final     Sodium   Date Value Ref Range Status   08/18/2022 140 136 - 145 mmol/L Final     Potassium   Date Value Ref Range Status   08/18/2022 5.1 3.5 - 5.1 mmol/L Final     Chloride   Date Value Ref Range Status   08/18/2022 104 95 - 110 mmol/L Final     Magnesium   Date Value Ref Range Status   09/10/2020 1.9 1.6 - 2.6 mg/dL Final     AST   Date Value Ref Range Status   08/18/2022 24 10 - 40 U/L Final     ALT   Date Value Ref Range Status   08/18/2022 22 10 - 44 U/L Final     Alkaline Phosphatase   Date Value Ref Range Status   08/18/2022 86 55 - 135 U/L Final     Total Bilirubin   Date Value Ref Range Status   08/18/2022 1.3 (H) 0.1 - 1.0 mg/dL Final     Comment:     For infants and newborns, interpretation of results should be based  on gestational age, weight and in agreement with clinical  observations.    Premature Infant recommended reference ranges:  Up to 24 hours.............<8.0 mg/dL  Up to 48 hours............<12.0 mg/dL  3-5 days..................<15.0 mg/dL  6-29 days.................<15.0 mg/dL       Albumin   Date Value Ref Range Status   08/18/2022 4.2 3.5 - 5.2 g/dL Final     Coumadin Monitoring INR   Date Value Ref Range Status   01/05/2012 2.3 (H) 0.8 - 1.2 Final     Comment:     ACCP Guidelline for Coumadin usage recommends a "target range" of  2.0 - 3.0 for INR for all indicators except " mechanical heart valves  and antiphospholipid syndromes which should use 2.5 - 3.5.  .     INR   Date Value Ref Range Status   10/08/2020 1.6 (H) 0.8 - 1.2 Final     Comment:     Coumadin Therapy:  2.0 - 3.0 for INR for all indicators except mechanical heart valves  and antiphospholipid syndromes which should use 2.5 - 3.5.           Assessment/Plan:     1. Elevated bilirubin    2. Tricuspid atresia with D-transposition of great arteries    3. S/P Fontan procedure    4. Thrombocytopenia      Marcello Briggs is a 47 y.o. male withAbnormal LFTs    Elevated bilirubin-can be seen with passive congestion or with Gilbert's syndrome  -will evaluate further  -will evaluate for chronic liver disease with FibroScan  -proceed with ultrasound imaging  -     Hepatic Function Panel; Future; Expected date: 08/24/2022  -     Hepatitis B Surface Ab, Qualitative; Future; Expected date: 08/24/2022  -     Hepatitis B Surface Antigen; Future; Expected date: 08/24/2022  -     Hepatitis C Antibody; Future; Expected date: 08/24/2022  -     Hepatitis A antibody, IgG; Future; Expected date: 08/24/2022  -     US Elastography Liver; Future; Expected date: 08/24/2022  -     Gamma GT; Future; Expected date: 08/24/2022    Tricuspid atresia with D-transposition of great arteries  -     Ambulatory referral/consult to Hepatology  -     US Abdomen Complete; Future; Expected date: 08/24/2022  -     US Elastography Liver; Future; Expected date: 08/24/2022    S/P Fontan procedure  -     Ambulatory referral/consult to Hepatology  -     US Abdomen Complete; Future; Expected date: 08/24/2022  -     US Elastography Liver; Future; Expected date: 08/24/2022    Thrombocytopenia-unclear etiology will evaluate for splenomegaly  -     US Abdomen Complete; Future; Expected date: 08/24/2022  -     US Elastography Liver; Future; Expected date: 08/24/2022  -     CBC Auto Differential; Future; Expected date: 08/24/2022      Return to clinic in 6 months with preclinic  labs    Emily Elizondo MD

## 2022-08-25 ENCOUNTER — PATIENT MESSAGE (OUTPATIENT)
Dept: PEDIATRIC CARDIOLOGY | Facility: CLINIC | Age: 47
End: 2022-08-25
Payer: COMMERCIAL

## 2022-08-25 LAB
HAV IGG SER QL IA: NEGATIVE
HBV SURFACE AB SER-ACNC: NEGATIVE M[IU]/ML
HBV SURFACE AG SERPL QL IA: NEGATIVE
HCV AB SERPL QL IA: NEGATIVE

## 2022-08-28 LAB
OHS CV EVENT MONITOR DAY: 1
OHS CV HOLTER HOOKUP DATE: NORMAL
OHS CV HOLTER HOOKUP TIME: NORMAL
OHS CV HOLTER LENGTH DECIMAL HOURS: 24
OHS CV HOLTER LENGTH HOURS: 0
OHS CV HOLTER LENGTH MINUTES: 0
OHS CV HOLTER SCAN DATE: NORMAL
OHS CV HOLTER SINUS AVERAGE HR: 77 BPM
OHS CV HOLTER SINUS MAX HR: 111 BPM
OHS CV HOLTER SINUS MIN HR: 66 BPM
OHS CV HOLTER STUDY END DATE: NORMAL
OHS CV HOLTER STUDY END TIME: NORMAL

## 2022-08-29 ENCOUNTER — PATIENT MESSAGE (OUTPATIENT)
Dept: HEPATOLOGY | Facility: CLINIC | Age: 47
End: 2022-08-29
Payer: COMMERCIAL

## 2022-08-30 ENCOUNTER — TELEPHONE (OUTPATIENT)
Dept: PEDIATRIC CARDIOLOGY | Facility: CLINIC | Age: 47
End: 2022-08-30
Payer: COMMERCIAL

## 2022-08-30 NOTE — TELEPHONE ENCOUNTER
Left voicemail offering September 28th or 29th for heart cath. Encouraged to call back to complete scheduling process.       ----- Message from Raza Lester Jr., MD sent at 8/19/2022  4:09 PM CDT -----  Regarding: RE: Cath in Fontan  Ok to schedule for cath  Can use brachial vein and radial artery if legs are bothering him  ----- Message -----  From: Samantha Babin RN  Sent: 8/18/2022   4:48 PM CDT  To: Raza Lester Jr., MD, #  Subject: FW: Cath in Fontan                               Please advise.     Rosalva   ----- Message -----  From: Dereje Acosta MD  Sent: 8/18/2022  10:11 AM CDT  To: Muriel Pro MD, #  Subject: Cath in Fontan                                   Can you get him set up for diagnostic cath?  Fontan - been 20 years since cath.  EPicardial pacemaker, can't MRI.    Has bed varicose veins in right leg.  Maybe shoot those groin vessels at cath?  I'm going to send him to vascular med as well.

## 2022-09-16 ENCOUNTER — PATIENT MESSAGE (OUTPATIENT)
Dept: PEDIATRIC CARDIOLOGY | Facility: CLINIC | Age: 47
End: 2022-09-16
Payer: COMMERCIAL

## 2022-11-18 ENCOUNTER — PATIENT MESSAGE (OUTPATIENT)
Dept: PEDIATRIC CARDIOLOGY | Facility: CLINIC | Age: 47
End: 2022-11-18
Payer: COMMERCIAL

## 2022-11-21 ENCOUNTER — HOSPITAL ENCOUNTER (OUTPATIENT)
Dept: PEDIATRIC CARDIOLOGY | Facility: HOSPITAL | Age: 47
Discharge: HOME | End: 2022-11-21
Attending: PEDIATRICS

## 2022-11-21 DIAGNOSIS — Q22.4 TRICUSPID ATRESIA WITH D-TRANSPOSITION OF GREAT ARTERIES: ICD-10-CM

## 2022-11-21 DIAGNOSIS — Q21.0 VSD (VENTRICULAR SEPTAL DEFECT AND AORTIC ARCH HYPOPLASIA: ICD-10-CM

## 2022-11-21 DIAGNOSIS — Z98.890 S/P FONTAN PROCEDURE: ICD-10-CM

## 2022-11-21 DIAGNOSIS — I48.92 ATRIAL FLUTTER, UNSPECIFIED TYPE: ICD-10-CM

## 2022-11-21 DIAGNOSIS — Q25.42 VSD (VENTRICULAR SEPTAL DEFECT AND AORTIC ARCH HYPOPLASIA: ICD-10-CM

## 2022-11-21 DIAGNOSIS — Q24.9 ADULT CONGENITAL HEART DISEASE: ICD-10-CM

## 2022-11-21 DIAGNOSIS — Q24.9 PULMONARY ARTERIAL HYPERTENSION ASSOCIATED WITH CONGENITAL HEART DISEASE: ICD-10-CM

## 2022-11-21 DIAGNOSIS — I27.29 PULMONARY ARTERIAL HYPERTENSION ASSOCIATED WITH CONGENITAL HEART DISEASE: ICD-10-CM

## 2022-11-21 DIAGNOSIS — I50.9 HEART FAILURE DUE TO CONGENITAL HEART DISEASE: ICD-10-CM

## 2022-11-21 DIAGNOSIS — Z95.0 PACEMAKER: ICD-10-CM

## 2022-11-21 DIAGNOSIS — Q20.3 TRICUSPID ATRESIA WITH D-TRANSPOSITION OF GREAT ARTERIES: ICD-10-CM

## 2022-11-21 DIAGNOSIS — Q24.9 HEART FAILURE DUE TO CONGENITAL HEART DISEASE: ICD-10-CM

## 2022-11-21 PROCEDURE — 93294 REM INTERROG EVL PM/LDLS PM: CPT | Mod: ,,, | Performed by: PEDIATRICS

## 2022-11-21 PROCEDURE — 93296 REM INTERROG EVL PM/IDS: CPT

## 2022-11-21 PROCEDURE — 93294 CV PACEMAKER REMOTE PEDIATRICS (CUPID ONLY): ICD-10-PCS | Mod: ,,, | Performed by: PEDIATRICS

## 2023-01-19 NOTE — TELEPHONE ENCOUNTER
Attempted to contact patient to schedule a cardiac cath procedure. Multiple attempts to schedule in the past with no return call back to clinic. Dr. Acosta updated regarding attempts to schedule.

## 2023-02-07 LAB
AV DELAY - LONGEST: 200 MSEC
BATTERY VOLTAGE (V): 2.98 V
IMPEDANCE RA LEAD (NATIVE): 340 OHMS
IMPEDANCE RA LEAD: 310 OHMS
OHS CV DC PP MS1: 1 MS
OHS CV DC PP MS2: 0.5 MS
OHS CV DC PP V1: 2 V
OHS CV DC PP V2: NORMAL V
P/R-WAVE RA LEAD (NATIVE): 9.1 MV
P/R-WAVE RA LEAD: 3.5 MV
PV DELAY - LONGEST: 200 MSEC

## 2023-02-17 ENCOUNTER — TELEPHONE (OUTPATIENT)
Dept: PEDIATRIC CARDIOLOGY | Facility: CLINIC | Age: 48
End: 2023-02-17

## 2023-02-17 NOTE — TELEPHONE ENCOUNTER
Spoke to patient, informed Marcello  he is scheduled on Monday to do a remote transmission on device. Informed transmission can be sent anytime before Monday. Verbalized understanding.

## 2023-02-22 ENCOUNTER — TELEPHONE (OUTPATIENT)
Dept: PEDIATRIC CARDIOLOGY | Facility: CLINIC | Age: 48
End: 2023-02-22

## 2023-02-23 NOTE — TELEPHONE ENCOUNTER
Called to notify patient that we have not received his pacemaker transmission. No answer, left voicemail with number for [facility] and clinic number.

## 2023-02-24 ENCOUNTER — PATIENT MESSAGE (OUTPATIENT)
Dept: PEDIATRIC CARDIOLOGY | Facility: CLINIC | Age: 48
End: 2023-02-24

## 2023-03-13 ENCOUNTER — HOSPITAL ENCOUNTER (OUTPATIENT)
Dept: PEDIATRIC CARDIOLOGY | Facility: HOSPITAL | Age: 48
Discharge: HOME | End: 2023-03-13
Attending: PEDIATRICS

## 2023-03-13 DIAGNOSIS — Q24.9 PULMONARY ARTERIAL HYPERTENSION ASSOCIATED WITH CONGENITAL HEART DISEASE: ICD-10-CM

## 2023-03-13 DIAGNOSIS — I27.29 PULMONARY ARTERIAL HYPERTENSION ASSOCIATED WITH CONGENITAL HEART DISEASE: ICD-10-CM

## 2023-03-13 DIAGNOSIS — Q24.9 ADULT CONGENITAL HEART DISEASE: ICD-10-CM

## 2023-03-13 DIAGNOSIS — I50.9 HEART FAILURE DUE TO CONGENITAL HEART DISEASE: ICD-10-CM

## 2023-03-13 DIAGNOSIS — Q24.9 HEART FAILURE DUE TO CONGENITAL HEART DISEASE: ICD-10-CM

## 2023-03-13 DIAGNOSIS — I48.92 ATRIAL FLUTTER, UNSPECIFIED TYPE: ICD-10-CM

## 2023-03-13 DIAGNOSIS — Q21.0 VSD (VENTRICULAR SEPTAL DEFECT AND AORTIC ARCH HYPOPLASIA: ICD-10-CM

## 2023-03-13 DIAGNOSIS — Z98.890 S/P FONTAN PROCEDURE: ICD-10-CM

## 2023-03-13 DIAGNOSIS — Q25.42 VSD (VENTRICULAR SEPTAL DEFECT AND AORTIC ARCH HYPOPLASIA: ICD-10-CM

## 2023-03-13 DIAGNOSIS — Z95.0 PACEMAKER: ICD-10-CM

## 2023-03-13 DIAGNOSIS — Q20.3 TRICUSPID ATRESIA WITH D-TRANSPOSITION OF GREAT ARTERIES: ICD-10-CM

## 2023-03-13 DIAGNOSIS — Q22.4 TRICUSPID ATRESIA WITH D-TRANSPOSITION OF GREAT ARTERIES: ICD-10-CM

## 2023-03-13 PROCEDURE — 93294 REM INTERROG EVL PM/LDLS PM: CPT | Mod: ,,, | Performed by: PEDIATRICS

## 2023-03-13 PROCEDURE — 93296 REM INTERROG EVL PM/IDS: CPT

## 2023-03-13 PROCEDURE — 93294 CV PACEMAKER REMOTE PEDIATRICS (CUPID ONLY): ICD-10-PCS | Mod: ,,, | Performed by: PEDIATRICS

## 2023-03-15 DIAGNOSIS — Q24.9 ADULT CONGENITAL HEART DISEASE: ICD-10-CM

## 2023-03-15 DIAGNOSIS — Q21.0 VSD (VENTRICULAR SEPTAL DEFECT AND AORTIC ARCH HYPOPLASIA: ICD-10-CM

## 2023-03-15 DIAGNOSIS — Q20.3 TRICUSPID ATRESIA WITH D-TRANSPOSITION OF GREAT ARTERIES: Primary | ICD-10-CM

## 2023-03-15 DIAGNOSIS — Q22.4 TRICUSPID ATRESIA WITH D-TRANSPOSITION OF GREAT ARTERIES: Primary | ICD-10-CM

## 2023-03-15 DIAGNOSIS — Q25.42 VSD (VENTRICULAR SEPTAL DEFECT AND AORTIC ARCH HYPOPLASIA: ICD-10-CM

## 2023-03-15 DIAGNOSIS — Z95.0 PACEMAKER: ICD-10-CM

## 2023-03-15 DIAGNOSIS — Q24.9 HEART FAILURE DUE TO CONGENITAL HEART DISEASE: ICD-10-CM

## 2023-03-15 DIAGNOSIS — I48.92 ATRIAL FLUTTER, UNSPECIFIED TYPE: ICD-10-CM

## 2023-03-15 DIAGNOSIS — I50.9 HEART FAILURE DUE TO CONGENITAL HEART DISEASE: ICD-10-CM

## 2023-06-02 LAB
AV DELAY - LONGEST: 200 MSEC
BATTERY VOLTAGE (V): 2.98 V
IMPEDANCE RA LEAD (NATIVE): 310 OHMS
IMPEDANCE RA LEAD: 310 OHMS
OHS CV DC PP MS1: 1 MS
OHS CV DC PP MS2: 0.5 MS
OHS CV DC PP V1: 2 V
OHS CV DC PP V2: NORMAL V
P/R-WAVE RA LEAD (NATIVE): 7.6 MV
P/R-WAVE RA LEAD: 3.6 MV
PV DELAY - LONGEST: 200 MSEC

## 2023-06-09 ENCOUNTER — TELEPHONE (OUTPATIENT)
Dept: PEDIATRIC CARDIOLOGY | Facility: CLINIC | Age: 48
End: 2023-06-09
Payer: COMMERCIAL

## 2023-06-09 ENCOUNTER — PATIENT MESSAGE (OUTPATIENT)
Dept: PEDIATRIC CARDIOLOGY | Facility: CLINIC | Age: 48
End: 2023-06-09
Payer: COMMERCIAL

## 2023-06-09 NOTE — TELEPHONE ENCOUNTER
Spoke to Marcello regarding transmission from pacemaker due Monday. Discussed messages being sent to [employer] but not being read, Informed him that is how we send messages and results. He stated he would be better about checking portal.

## 2023-06-12 ENCOUNTER — HOSPITAL ENCOUNTER (OUTPATIENT)
Dept: PEDIATRIC CARDIOLOGY | Facility: HOSPITAL | Age: 48
Discharge: HOME | End: 2023-06-12
Attending: PEDIATRICS
Payer: COMMERCIAL

## 2023-06-12 ENCOUNTER — PATIENT MESSAGE (OUTPATIENT)
Dept: CARDIOLOGY | Facility: CLINIC | Age: 48
End: 2023-06-12
Payer: COMMERCIAL

## 2023-06-12 ENCOUNTER — TELEPHONE (OUTPATIENT)
Dept: PEDIATRIC CARDIOLOGY | Facility: CLINIC | Age: 48
End: 2023-06-12
Payer: COMMERCIAL

## 2023-06-12 DIAGNOSIS — Q24.9 HEART FAILURE DUE TO CONGENITAL HEART DISEASE: ICD-10-CM

## 2023-06-12 DIAGNOSIS — Q22.4 TRICUSPID ATRESIA WITH D-TRANSPOSITION OF GREAT ARTERIES: ICD-10-CM

## 2023-06-12 DIAGNOSIS — I50.9 HEART FAILURE DUE TO CONGENITAL HEART DISEASE: ICD-10-CM

## 2023-06-12 DIAGNOSIS — Q20.3 TRICUSPID ATRESIA WITH D-TRANSPOSITION OF GREAT ARTERIES: ICD-10-CM

## 2023-06-12 DIAGNOSIS — Q21.0 VSD (VENTRICULAR SEPTAL DEFECT AND AORTIC ARCH HYPOPLASIA: ICD-10-CM

## 2023-06-12 DIAGNOSIS — Q25.42 VSD (VENTRICULAR SEPTAL DEFECT AND AORTIC ARCH HYPOPLASIA: ICD-10-CM

## 2023-06-12 DIAGNOSIS — Q24.9 ADULT CONGENITAL HEART DISEASE: ICD-10-CM

## 2023-06-12 DIAGNOSIS — Z95.0 PACEMAKER: ICD-10-CM

## 2023-06-12 DIAGNOSIS — I48.92 ATRIAL FLUTTER, UNSPECIFIED TYPE: ICD-10-CM

## 2023-06-12 PROCEDURE — 93294 CV PACEMAKER REMOTE PEDIATRICS (CUPID ONLY): ICD-10-PCS | Mod: ,,, | Performed by: PEDIATRICS

## 2023-06-12 PROCEDURE — 93294 REM INTERROG EVL PM/LDLS PM: CPT | Mod: ,,, | Performed by: PEDIATRICS

## 2023-06-12 PROCEDURE — 93296 REM INTERROG EVL PM/IDS: CPT

## 2023-06-12 NOTE — TELEPHONE ENCOUNTER
Spoke to Marcello regarding REMOTE transmission not coming through automatically. Instructed to call [employer] [facility] [phone] for troubleshooting issues. Instructed to ask if phone geovany or wifi geovany available secondary to where he lives. He will call them today.

## 2023-06-19 DIAGNOSIS — Q24.9 PULMONARY ARTERIAL HYPERTENSION ASSOCIATED WITH CONGENITAL HEART DISEASE: ICD-10-CM

## 2023-06-19 DIAGNOSIS — I50.9 HEART FAILURE DUE TO CONGENITAL HEART DISEASE: ICD-10-CM

## 2023-06-19 DIAGNOSIS — I27.29 PULMONARY ARTERIAL HYPERTENSION ASSOCIATED WITH CONGENITAL HEART DISEASE: ICD-10-CM

## 2023-06-19 DIAGNOSIS — Q24.9 HEART FAILURE DUE TO CONGENITAL HEART DISEASE: ICD-10-CM

## 2023-06-19 DIAGNOSIS — Z95.0 PACEMAKER: Primary | ICD-10-CM

## 2023-06-19 DIAGNOSIS — Q24.9 ADULT CONGENITAL HEART DISEASE: ICD-10-CM

## 2023-06-19 DIAGNOSIS — Z98.890 S/P FONTAN PROCEDURE: ICD-10-CM

## 2023-06-19 DIAGNOSIS — Q20.3 TRICUSPID ATRESIA WITH D-TRANSPOSITION OF GREAT ARTERIES: ICD-10-CM

## 2023-06-19 DIAGNOSIS — Q22.4 TRICUSPID ATRESIA WITH D-TRANSPOSITION OF GREAT ARTERIES: ICD-10-CM

## 2023-07-11 RX ORDER — ENALAPRIL MALEATE 10 MG/1
10 TABLET ORAL 2 TIMES DAILY
Qty: 60 TABLET | Refills: 3 | Status: SHIPPED | OUTPATIENT
Start: 2023-07-11 | End: 2023-07-24 | Stop reason: SDUPTHER

## 2023-07-24 DIAGNOSIS — I27.29 PULMONARY ARTERIAL HYPERTENSION ASSOCIATED WITH CONGENITAL HEART DISEASE: ICD-10-CM

## 2023-07-24 DIAGNOSIS — Q24.9 PULMONARY ARTERIAL HYPERTENSION ASSOCIATED WITH CONGENITAL HEART DISEASE: ICD-10-CM

## 2023-07-24 DIAGNOSIS — Z98.890 S/P FONTAN PROCEDURE: ICD-10-CM

## 2023-07-24 RX ORDER — SILDENAFIL CITRATE 20 MG/1
20 TABLET ORAL 2 TIMES DAILY
Qty: 60 TABLET | Refills: 3 | Status: SHIPPED | OUTPATIENT
Start: 2023-07-24

## 2023-07-24 RX ORDER — DIGOXIN 250 MCG
0.25 TABLET ORAL EVERY MORNING
Qty: 30 TABLET | Refills: 3 | Status: SHIPPED | OUTPATIENT
Start: 2023-07-24 | End: 2023-12-04

## 2023-07-24 RX ORDER — ENALAPRIL MALEATE 10 MG/1
10 TABLET ORAL 2 TIMES DAILY
Qty: 60 TABLET | Refills: 3 | Status: SHIPPED | OUTPATIENT
Start: 2023-07-24

## 2023-07-24 RX ORDER — WARFARIN 2 MG/1
TABLET ORAL
Qty: 35 TABLET | Refills: 3 | Status: ON HOLD | OUTPATIENT
Start: 2023-07-24 | End: 2023-12-01 | Stop reason: HOSPADM

## 2023-07-24 RX ORDER — WARFARIN 6 MG/1
TABLET ORAL
Qty: 30 TABLET | Refills: 3 | Status: ON HOLD | OUTPATIENT
Start: 2023-07-24 | End: 2023-12-01 | Stop reason: HOSPADM

## 2023-08-14 LAB
AV DELAY - LONGEST: 200 MSEC
BATTERY VOLTAGE (V): 2.98 V
IMPEDANCE RA LEAD (NATIVE): 310 OHMS
IMPEDANCE RA LEAD: 310 OHMS
OHS CV DC PP MS1: 1 MS
OHS CV DC PP MS2: 0.5 MS
OHS CV DC PP V1: 2 V
OHS CV DC PP V2: NORMAL V
P/R-WAVE RA LEAD (NATIVE): 7.6 MV
P/R-WAVE RA LEAD: 3.3 MV
PV DELAY - LONGEST: 200 MSEC

## 2023-09-07 ENCOUNTER — CLINICAL SUPPORT (OUTPATIENT)
Dept: CARDIOLOGY | Facility: CLINIC | Age: 48
End: 2023-09-07
Attending: PEDIATRICS
Payer: COMMERCIAL

## 2023-09-07 ENCOUNTER — HOSPITAL ENCOUNTER (OUTPATIENT)
Dept: CARDIOLOGY | Facility: HOSPITAL | Age: 48
Discharge: HOME | End: 2023-09-07
Attending: PEDIATRICS
Payer: COMMERCIAL

## 2023-09-07 ENCOUNTER — OFFICE VISIT (OUTPATIENT)
Dept: CARDIOLOGY | Facility: CLINIC | Age: 48
End: 2023-09-07
Payer: COMMERCIAL

## 2023-09-07 ENCOUNTER — HOSPITAL ENCOUNTER (OUTPATIENT)
Dept: PEDIATRIC CARDIOLOGY | Facility: HOSPITAL | Age: 48
Discharge: HOME | End: 2023-09-07
Attending: PEDIATRICS
Payer: COMMERCIAL

## 2023-09-07 ENCOUNTER — HOSPITAL ENCOUNTER (OUTPATIENT)
Dept: CARDIOLOGY | Facility: CLINIC | Age: 48
Discharge: HOME | End: 2023-09-07
Payer: COMMERCIAL

## 2023-09-07 VITALS
OXYGEN SATURATION: 84 % | DIASTOLIC BLOOD PRESSURE: 64 MMHG | SYSTOLIC BLOOD PRESSURE: 140 MMHG | DIASTOLIC BLOOD PRESSURE: 64 MMHG | SYSTOLIC BLOOD PRESSURE: 140 MMHG | HEART RATE: 75 BPM | BODY MASS INDEX: 28.39 KG/M2 | HEIGHT: 63 IN | WEIGHT: 160.25 LBS | OXYGEN SATURATION: 84 % | HEART RATE: 75 BPM | HEIGHT: 63 IN | BODY MASS INDEX: 28.39 KG/M2 | WEIGHT: 160.25 LBS

## 2023-09-07 VITALS — HEIGHT: 63 IN | BODY MASS INDEX: 29.77 KG/M2 | WEIGHT: 168 LBS

## 2023-09-07 DIAGNOSIS — Q24.9 HEART FAILURE DUE TO CONGENITAL HEART DISEASE: ICD-10-CM

## 2023-09-07 DIAGNOSIS — I48.92 ATRIAL FLUTTER, UNSPECIFIED TYPE: ICD-10-CM

## 2023-09-07 DIAGNOSIS — Q22.4 TRICUSPID ATRESIA WITH D-TRANSPOSITION OF GREAT ARTERIES: Primary | ICD-10-CM

## 2023-09-07 DIAGNOSIS — Q20.3 TRICUSPID ATRESIA WITH D-TRANSPOSITION OF GREAT ARTERIES: ICD-10-CM

## 2023-09-07 DIAGNOSIS — Z95.0 PACEMAKER: ICD-10-CM

## 2023-09-07 DIAGNOSIS — Z98.890 S/P FONTAN PROCEDURE: ICD-10-CM

## 2023-09-07 DIAGNOSIS — I27.29 PULMONARY ARTERIAL HYPERTENSION ASSOCIATED WITH CONGENITAL HEART DISEASE: ICD-10-CM

## 2023-09-07 DIAGNOSIS — I49.9 VENTRICULAR ARRHYTHMIA: ICD-10-CM

## 2023-09-07 DIAGNOSIS — Q22.4 TRICUSPID ATRESIA WITH D-TRANSPOSITION OF GREAT ARTERIES: ICD-10-CM

## 2023-09-07 DIAGNOSIS — Q24.9 PULMONARY ARTERIAL HYPERTENSION ASSOCIATED WITH CONGENITAL HEART DISEASE: ICD-10-CM

## 2023-09-07 DIAGNOSIS — Q20.3 TRICUSPID ATRESIA WITH D-TRANSPOSITION OF GREAT ARTERIES: Primary | ICD-10-CM

## 2023-09-07 DIAGNOSIS — Q21.0 VSD (VENTRICULAR SEPTAL DEFECT AND AORTIC ARCH HYPOPLASIA: ICD-10-CM

## 2023-09-07 DIAGNOSIS — I49.5 SINUS NODE DYSFUNCTION: Primary | ICD-10-CM

## 2023-09-07 DIAGNOSIS — Q25.42 VSD (VENTRICULAR SEPTAL DEFECT AND AORTIC ARCH HYPOPLASIA: ICD-10-CM

## 2023-09-07 DIAGNOSIS — I50.9 HEART FAILURE DUE TO CONGENITAL HEART DISEASE: ICD-10-CM

## 2023-09-07 DIAGNOSIS — Z95.0 PACEMAKER: Primary | ICD-10-CM

## 2023-09-07 DIAGNOSIS — Q24.9 ADULT CONGENITAL HEART DISEASE: ICD-10-CM

## 2023-09-07 DIAGNOSIS — R23.0 CYANOSIS: ICD-10-CM

## 2023-09-07 LAB
AV DELAY - LONGEST: 200 MSEC
AV INDEX (PROSTH): 0.95
AV MEAN GRADIENT: 2 MMHG
AV PEAK GRADIENT: 4 MMHG
AV VALVE AREA BY VELOCITY RATIO: 4.25 CM²
AV VALVE AREA: 4.67 CM²
AV VELOCITY RATIO: 0.86
BATTERY VOLTAGE (V): 2.96 V
BSA FOR ECHO PROCEDURE: 1.8 M2
CV ECHO LV RWT: 0.21 CM
DOP CALC AO PEAK VEL: 1 M/S
DOP CALC AO VTI: 17.48 CM
DOP CALC LVOT AREA: 4.9 CM2
DOP CALC LVOT DIAMETER: 2.51 CM
DOP CALC LVOT PEAK VEL: 0.86 M/S
DOP CALC LVOT STROKE VOLUME: 81.7 CM3
DOP CALC RVOT PEAK VEL: 0.31 M/S
DOP CALC RVOT VTI: 4.94 CM
DOP CALCLVOT PEAK VEL VTI: 16.52 CM
ECHO LV POSTERIOR WALL: 0.87 CM (ref 0.6–1.1)
FRACTIONAL SHORTENING: 21 % (ref 28–44)
IMPEDANCE RA LEAD (NATIVE): 310 OHMS
IMPEDANCE RA LEAD: 310 OHMS
INTERVENTRICULAR SEPTUM: 0.9 CM (ref 0.6–1.1)
LA MAJOR: 8.31 CM
LA MINOR: 7.56 CM
LA WIDTH: 5.63 CM
LEFT ATRIUM SIZE: 5 CM
LEFT ATRIUM VOLUME INDEX MOD: 84.6 ML/M2
LEFT ATRIUM VOLUME INDEX: 107.6 ML/M2
LEFT ATRIUM VOLUME MOD: 148.84 CM3
LEFT ATRIUM VOLUME: 189.44 CM3
LEFT INTERNAL DIMENSION IN SYSTOLE: 6.51 CM (ref 2.1–4)
LEFT VENTRICLE DIASTOLIC VOLUME INDEX: 206.05 ML/M2
LEFT VENTRICLE DIASTOLIC VOLUME: 362.65 ML
LEFT VENTRICLE MASS INDEX: 208 G/M2
LEFT VENTRICLE SYSTOLIC VOLUME INDEX: 123.1 ML/M2
LEFT VENTRICLE SYSTOLIC VOLUME: 216.6 ML
LEFT VENTRICULAR INTERNAL DIMENSION IN DIASTOLE: 8.19 CM (ref 3.5–6)
LEFT VENTRICULAR MASS: 365.59 G
OHS CV DC PP MS1: 1 MS
OHS CV DC PP MS2: 0.5 MS
OHS CV DC PP V1: 2 V
OHS CV DC PP V2: NORMAL V
P/R-WAVE RA LEAD (NATIVE): 7.3 MV
PULM VEIN S/D RATIO: 0.41
PV DELAY - LONGEST: 200 MSEC
PV MEAN GRADIENT: 0 MMHG
PV PEAK D VEL: 0.37 M/S
PV PEAK GRADIENT: 2 MMHG
PV PEAK S VEL: 0.15 M/S
PV PEAK VELOCITY: 0.65 M/S
SINUS: 4.01 CM
STJ: 2.84 CM
TDI LATERAL: 0.14 M/S
THRESHOLD MS RA LEAD (NATIVE): 0.5 MS
THRESHOLD MS RA LEAD: 1 MS
THRESHOLD V RA LEAD (NATIVE): 1 V
THRESHOLD V RA LEAD: 1 V
Z-SCORE OF LEFT VENTRICULAR DIMENSION IN END DIASTOLE: 5.21
Z-SCORE OF LEFT VENTRICULAR DIMENSION IN END SYSTOLE: 6.1

## 2023-09-07 PROCEDURE — 99214 OFFICE O/P EST MOD 30 MIN: CPT | Mod: S$GLB,,, | Performed by: PEDIATRICS

## 2023-09-07 PROCEDURE — 3078F PR MOST RECENT DIASTOLIC BLOOD PRESSURE < 80 MM HG: ICD-10-PCS | Mod: CPTII,S$GLB,, | Performed by: PEDIATRICS

## 2023-09-07 PROCEDURE — 3008F BODY MASS INDEX DOCD: CPT | Mod: CPTII,S$GLB,, | Performed by: PEDIATRICS

## 2023-09-07 PROCEDURE — 93303 ECHO TRANSTHORACIC: CPT | Mod: 26,,, | Performed by: PEDIATRICS

## 2023-09-07 PROCEDURE — 93320 ECHO (CUPID ONLY): ICD-10-PCS | Mod: 26,,, | Performed by: PEDIATRICS

## 2023-09-07 PROCEDURE — 93010 EKG 12-LEAD: ICD-10-PCS | Mod: S$GLB,,, | Performed by: INTERNAL MEDICINE

## 2023-09-07 PROCEDURE — 3077F SYST BP >= 140 MM HG: CPT | Mod: CPTII,S$GLB,, | Performed by: PEDIATRICS

## 2023-09-07 PROCEDURE — 93320 DOPPLER ECHO COMPLETE: CPT | Mod: 26,,, | Performed by: PEDIATRICS

## 2023-09-07 PROCEDURE — 99214 PR OFFICE/OUTPT VISIT, EST, LEVL IV, 30-39 MIN: ICD-10-PCS | Mod: S$GLB,,, | Performed by: PEDIATRICS

## 2023-09-07 PROCEDURE — 93325 DOPPLER ECHO COLOR FLOW MAPG: CPT

## 2023-09-07 PROCEDURE — 3008F PR BODY MASS INDEX (BMI) DOCUMENTED: ICD-10-PCS | Mod: CPTII,S$GLB,, | Performed by: PEDIATRICS

## 2023-09-07 PROCEDURE — 3077F PR MOST RECENT SYSTOLIC BLOOD PRESSURE >= 140 MM HG: ICD-10-PCS | Mod: CPTII,S$GLB,, | Performed by: PEDIATRICS

## 2023-09-07 PROCEDURE — 4010F PR ACE/ARB THEARPY RXD/TAKEN: ICD-10-PCS | Mod: CPTII,S$GLB,, | Performed by: PEDIATRICS

## 2023-09-07 PROCEDURE — 3078F DIAST BP <80 MM HG: CPT | Mod: CPTII,S$GLB,, | Performed by: PEDIATRICS

## 2023-09-07 PROCEDURE — 93325 ECHO (CUPID ONLY): ICD-10-PCS | Mod: 26,,, | Performed by: PEDIATRICS

## 2023-09-07 PROCEDURE — 93280 PM DEVICE PROGR EVAL DUAL: CPT | Mod: S$GLB,,, | Performed by: PEDIATRICS

## 2023-09-07 PROCEDURE — 93303 ECHO (CUPID ONLY): ICD-10-PCS | Mod: 26,,, | Performed by: PEDIATRICS

## 2023-09-07 PROCEDURE — 93005 EKG 12-LEAD: ICD-10-PCS | Mod: S$GLB,,, | Performed by: PEDIATRICS

## 2023-09-07 PROCEDURE — 93325 DOPPLER ECHO COLOR FLOW MAPG: CPT | Mod: 26,,, | Performed by: PEDIATRICS

## 2023-09-07 PROCEDURE — 99999 PR PBB SHADOW E&M-EST. PATIENT-LVL III: ICD-10-PCS | Mod: PBBFAC,,, | Performed by: PEDIATRICS

## 2023-09-07 PROCEDURE — 99999 PR PBB SHADOW E&M-EST. PATIENT-LVL III: CPT | Mod: PBBFAC,,, | Performed by: PEDIATRICS

## 2023-09-07 PROCEDURE — 93010 ELECTROCARDIOGRAM REPORT: CPT | Mod: S$GLB,,, | Performed by: INTERNAL MEDICINE

## 2023-09-07 PROCEDURE — 93005 ELECTROCARDIOGRAM TRACING: CPT | Mod: S$GLB,,, | Performed by: PEDIATRICS

## 2023-09-07 PROCEDURE — 93244 CV 3-14 DAY PEDIATRIC HOLTER MONITOR (CUPID ONLY): ICD-10-PCS | Mod: ,,, | Performed by: PEDIATRICS

## 2023-09-07 PROCEDURE — 99215 OFFICE O/P EST HI 40 MIN: CPT | Mod: S$GLB,,, | Performed by: PEDIATRICS

## 2023-09-07 PROCEDURE — 4010F ACE/ARB THERAPY RXD/TAKEN: CPT | Mod: CPTII,S$GLB,, | Performed by: PEDIATRICS

## 2023-09-07 PROCEDURE — 99215 PR OFFICE/OUTPT VISIT, EST, LEVL V, 40-54 MIN: ICD-10-PCS | Mod: S$GLB,,, | Performed by: PEDIATRICS

## 2023-09-07 PROCEDURE — 93242 EXT ECG>48HR<7D RECORDING: CPT

## 2023-09-07 PROCEDURE — 99999 PR PBB SHADOW E&M-EST. PATIENT-LVL I: CPT | Mod: PBBFAC,,,

## 2023-09-07 PROCEDURE — 93244 EXT ECG>48HR<7D REV&INTERPJ: CPT | Mod: ,,, | Performed by: PEDIATRICS

## 2023-09-07 RX ORDER — FUROSEMIDE 20 MG/1
20 TABLET ORAL DAILY
Qty: 30 TABLET | Refills: 11 | Status: SHIPPED | OUTPATIENT
Start: 2023-09-07 | End: 2024-09-06

## 2023-09-07 RX ORDER — SOTALOL HYDROCHLORIDE 240 MG/1
240 TABLET ORAL 2 TIMES DAILY
Qty: 180 TABLET | Refills: 3 | Status: CANCELLED | OUTPATIENT
Start: 2023-09-07

## 2023-09-07 RX ORDER — METOPROLOL SUCCINATE 100 MG/1
300 TABLET, EXTENDED RELEASE ORAL DAILY
Qty: 270 TABLET | Refills: 3 | Status: SHIPPED | OUTPATIENT
Start: 2023-09-07 | End: 2024-09-06

## 2023-09-07 NOTE — PROGRESS NOTES
"Name: Marcello Briggs  MRN: [id number]  : 1975      Subjective:   CC: Single Ventricular / Fontan Physiology    HPI:    Marcello Briggs is a 48 y.o. 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     He has a number of high rate episodes on today's device interrogation. This has been noted previously Many are between 1am-5am. He has CPAP for MIGUEL, but reports that he doesn't use it. He denies any palpitations. He hasn't taken his sotalol in several years. While I had not discontinued it, he reports being unsure if I should have been on the sotalol, but never asked. He also has not taken his metoprolol for at least 1 month (though I suspect longer). At last visit, we discussed scheduling a catheterization, but he hasn't done so yet. He states he briefly had hand cyanosis when squatting down that shortly resolved. Otherwise he is without complaint.      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  Cardiac/Surgical Hx  History of a classic Roger shunt and a 12 mm conduit from the right atrium to the left pulmonary artery along with a Damus Judy Stansel operation   now with mild native and brigette-aortic insufficiency  lateral tunnel Fontan with a 16 mm ring Goretex graft from the left pulmonary artery to the right pulmonary artery.  elevated central venous pressures with mild desaturation at least partially due to collateral vessels - improved after cath  but still an issue.  mild mitral regurgitation   Mildly decreased systemic ventricular function  Related:  Last liver US 2020  No evidence of protein-losing enteropathy.  Recurrent intra-atrial reentrant tachycardia   arrhythmias typically manifest as "back pain"  Sinus node dysfunction   most recent " pacemaker/ICD change 10/08/2020  Nonsustained VT  Noninvasive EP study (NIEPS) negative 2016  Varicose veins   followed in the past by vascular Medicine.    Improved pain and swelling.    Of note, the patient was accessed via the right femoral vein without difficulty at the 2010 catheterization.  Obstructive sleep apnea      Family Hx:  No known family history of congenital heart defects or cardiac surgeries in childhood.  No known family members with pacemakers or defibrillators.  No known inherited channelopathies or cardiomyopathies.  No known hx of sudden cardiac death or heart transplant.  No No known heart attack in someone less than 50yoa.    Social Hx:  Lives in [place], [place].    Review of Systems:  See HPI  ALL: See below.    Medications & Allergy:  Current Outpatient Medications on File Prior to Visit   Medication Sig Dispense Refill    digoxin (LANOXIN) 250 mcg tablet Take 1 tablet (0.25 mg total) by mouth every morning. 30 tablet 3    enalapril (VASOTEC) 10 MG tablet Take 1 tablet (10 mg total) by mouth 2 (two) times daily. 60 tablet 3    sildenafil (REVATIO) 20 mg Tab Take 1 tablet (20 mg total) by mouth 2 (two) times daily. 60 tablet 3    sotaloL (BETAPACE) 240 MG Tab TAKE 1 TABLET (240 MG TOTAL) BY MOUTH 2 (TWO) TIMES DAILY. 180 tablet 3    warfarin (COUMADIN) 2 MG tablet Take 10 mg (6mg + 2 (2mg) tabs every Saturday and 8mg (6mg +2 mg) all other days as directed 35 tablet 3    warfarin (COUMADIN) 6 MG tablet TAKE 10MG (6MG + 2 (2MG) TABS EVERY SATURDAY AND 8MG (6MG + 2MG) TABS ALL OTHER DAYS AS DIRECTED 30 tablet 3    [DISCONTINUED] furosemide (LASIX) 20 MG tablet TAKE 1 TABLET (20 MG TOTAL) BY MOUTH ONCE DAILY. 60 tablet 6    [DISCONTINUED] metoprolol succinate (TOPROL-XL) 100 MG 24 hr tablet TAKE 3 TABLETS (300 MG TOTAL) BY MOUTH ONCE DAILY. (Patient not taking: Reported on 9/7/2023) 270 tablet 3     No current facility-administered medications on file prior to visit.       Review of patient's  "allergies indicates:  No Known Allergies       Objective:   Vitals:  Vitals:    09/07/23 0928   BP: (!) 140/64   BP Location: Right arm   Patient Position: Sitting   Pulse: 75   SpO2: (!) 84%   Weight: 72.7 kg (160 lb 4.4 oz)   Height: 5' 2.99" (1.6 m)       Body mass index is 28.4 kg/m².  Body surface area is 1.8 meters squared.    Exam:  GEN: No acute distress, Normal appearing  EYE: Anicteric sclerae  ENT: No drainage, Moist mucous membranes  PULM: Normal work of breathing;  Clear to auscultation bilaterally, Good air movement throughout.  CV: No chest pain;   Single S2,   I/VI systolic murmur;   No rubs or gallops;  EXT: No cyanosis, Mild RLE edema. Significant varices noted (unchanged)   2+ radial and dorsalis pedis pulses bilaterally  ABD: Soft, Non-distended, Non-tender,    Liver edge 3cm below RCM;  Normal bowel sounds  DERM: No rashes  NEUR: Normal gait, Grossly normal tone.  PSY: Normal mood and affect      Results / Data:   ECG:   (09/07/2023) - AV sequentially paced rhythm and competing junctional rhythm.  (08/18/2022) - Atrial paced rhythm, intraventricular conduction delay.  (10/22/2020) - Atrial paced rhythm, intraventricular conduction delay.    Holter/Zio:   (08/18/2022)  Pending, placed today.    (09/18/2020)  Predominant rhythm is AV sequential pacing  Rare atrial/ventricular ectopy  20 episodes of slow SVT (longest 9 beats fastest 133 bpm)  One 4 beat episode of slow VT (max rate 156 bpm) and one ventricular triplet  No diary symptoms    (9/18/20)  Predominant rhythm is AV sequential pacing  Rare atrial/ventricular ectopy  20 episodes of slow SVT (longest 9 beats fastest 133 bpm)  One 4 beat episode of slow VT (max rate 156 bpm) and one ventricular triplet  No diary symptoms    Echocardiogram:   (09/07/2023)   CONGENITAL CARDIAC HISTORY:  Tricuspid atresia, hypoplastic right ventricle with ventricular septal defect and transposition of the great arteries.  INTERVENTION:  S/P Russell " and lateral tunnel Fontan.  S/P Coil and Amplatzer occlusion of venovenous collaterals (detailed description of anatomy in cath report July 2010).     SEGMENTAL CARDIAC CONNECTIONS (previously demonstrated):  Abdominal situs is solitus.  Atrial situs is normal.  Tricuspid atresia.  Abnormal mitral valve with tricuspid atresia.  Very dilated, globular left ventricle with very hypoplastic right ventricle.  D-transposed great vessels.  Aortic valve is normal and pulmonic valve is normal. D-loop.  Cardiac position is Levocardia.        IMPRESSION -  Technically difficult imaging -  Images consistent with tricuspid atresia, d-TGA, VSD and RV hypoplasia S/P DKS and lateral tunnel Fontan.  Normal intrahepatic inferior vena cava and hepatic veins joining lateral tunnel Fontan and slow moving spontaneous contrast, laminar flow and no obvious thrombus in IVC, hepatic veins, proximal lateral tunnel or SVC.  Unable to demonstrate remaining segments of Fontan circuit and pulmonary arteries with very limited suprasternal windows.  Small functional right atrium.  Unrestricted bidirectional flow at atrial septum.  Limited images demonstrating no obvious obstruction of pulmonary venous return to the dilated left atrium.  The left atrial volume index is severely enlarged measuring 108 ml/m2.  Large mitral annulus.  Mild mitral valve insufficiency.  Dilated left ventricle - severe.  Qualitatively low normal left (single) ventricle systolic function with EF estimated 50 -55% from apical views.  Limited images demonstrate unrestricted flow at VSD to anterior aorta and small subaortic chamber (Right ventricle).  Anterior native aortic valve with no stenosis and trivial to mild insufficiency.  Posteriorly positioned, mildly sclerotic, trileaflet pulmonary valve committed to the left ventricle with no left ventricular outflow obstruction, mild insufficiency and no valvar stenosis.  Good imaging of the DKS anastomosis with no evidence of  obstruction in either limb.  Technically difficult suprasternal imaging of the aorta demonstrates left aortic arch branching with no evidence of coarctation.  No pericardial effusion.    (08/18/2022)  CONGENITAL CARDIAC HISTORY:  Tricuspid atresia, hypoplastic right ventricle with ventricular septal defect and transposition of the great arteries.  INTERVENTION:  S/P [id number] and lateral tunnel Fontan.  S/P Coil and Amplatzer occlusion of venovenous collaterals (detailed description of anatomy in cath report July 2010).     SEGMENTAL CARDIAC CONNECTIONS:  Abdominal situs is solitus.   Atrial situs is normal.   Tricuspid atresia.   Abnormal mitral valve with tricuspid atresia.   Very dilated, globular left ventricle with very hypoplastic right ventricle.   D-transposed great vessels.   Aortic valve is normal and pulmonic valve is normal. D-loop.   Cardiac position is Levocardia.     IMPRESSION  Technically difficult imaging -  Images consistent with tricuspid atresia, d-TGA, VSD and RV hypoplasia S/P DKS and lateral tunnel Fontan.  Qualitative impression of improved LV function compared to previous study with no other significant changes noted.  Normal intrahepatic inferior vena cava and hepatic veins joining lateral tunnel Fontan and slow moving spontaneous contrast, laminar flow and no obvious thrombus in IVC, hepatic veins, proximal lateral tunnel or SVC.  Unable to demonstrate remaining segments of Fontan circuit and pulmonary arteries with very limited suprasternal windows.  Small functional right atrium.  Unrestricted bidirectional flow at atrial septum.  The left atrial volume index is severely enlarged measuring 101 ml/m2.   Large mitral annulus.  At least mild mitral valve insufficiency.  Dilated left ventricle - severe.  Qualitatively lborderline left (single) ventricle systolic function with EF estimated 45 -50% from apical views.  Limited images suggest unrestricted flow at VSD to anterior aorta  and small subaortic chamber (Right ventricle).  Anterior native aortic valve with no stenosis and trivial to mild insufficiency.  Posteriorly positioned trileaflet pulmonary valve committed to the left ventricle with no left ventricular outflow obstruction, trivial to mild insufficiency and no valvar stenosis.  Good imaging of the DKS anastomosis with no evidence of obstruction in either limb.  No pericardial effusion.      Pacemaker Interrogation:  (09/07/2023)    Mode: DDDR Lower limit rate: 75 bpm Upper tracking rate: 130 bpm    Reprogramming comments: No changes this session    General comments: Device interrogation and lead testing performed. Device and leads WNL. 59 HVR's    (06/14/2023)  REMOTE transmission received and data reviewed. Device and leads WNL. Battery longevity 2.0-2.6 yrs Atrial paced 40 % Ventricular paced 42 % 2 AMS- 1 EGM- Noise artifact noted 16 VHRs- Idioventricular rhythm with intermittent V-pacing    (08/18/2022, in clinic)  Permanent Programming   RA Lead: 2.0 V @ 1 ms. Sensitivity: 2 mV.   RV Lead: 1.375 Auto V @ 0.5 ms. Sensitivity: 2 mV.   Pacemaker Generator and Leads meet standard of FDA approval.   Chamber type: dual.   Mode: DDDR   Lower limit rate: 75 bpm   Upper tracking rate: 130 bpm   AV Delay Fixed: 200 msec   PV Delay Fixed: 200 msec  Battery voltage: 2.99 V  Estimated longevity: 3.9 - 4.8 years .   Magnet Rate: 100 ppm  Leads  RA Lead:        P/R-wave: (none)Paced mV       Lead Impedance: 340 Ohms       Paced: 82%   RV Lead:        Impedance: 350 Ohms       Paced: 78%   Thresholds  RA Lead: 1 V @ 1 ms. Configuration: unipolar.   RV Lead: 1.25 V @ 0.5 ms. Configuration: bipolar.  Reprogramming comments:  No changes this session   General comments:   Device interrogation and lead testing performed. Device and leads WNL.  HVR x 16. Longest 10 minutes 24 seconds;  Available EGM's show VS episodes; Minimal data on atrial EGM - occasional AP or noise artifact noted.        Catheterization: (July 7, 2010)   1. Complex univentricular cardiac defect with superior inferior ventricles with double inlet left ventricle (atrioventricular connection not defined at catheterization), status post [id number] connection and intracardiac lateral tunnel Fontan.   2. Moderate neoaortic (pulmonary) valve insufficiency.   3. Left aorta.   4. 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   5. Elevated central venous pressure (mean 19), transpulmonary gradient 4-5 mmHg.    Assessment / Plan:   Marcello Briggs is a 48 y.o. 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      He has had a number of high rate episodes this past interrogation. We have seen this before, and considered this possibly noise vs arrhythmia (it is also possible that the HVR is his junctional rhythm during exertion). The atrial lead impulses appear low-voltage, and could reflect atrial flutter or fibrillation, but they are below the sensing threshold.     One major change is him now being off metoprolol (at least a month) and Sotalol (likely a couple years). Also extremely important is that he is not using his CPAP for MIGUEL, particularly as many episodes are in the early morning. Two immediate interventions are resuming the metoprolol and resuming CPAP. We could consider resuming Sotalol if these concerns persist, but that has been long enough off of the medicine to necessitate admission to load the medicine, so for now we will leave this medicine off.    I agree with Dr. Acosta with catheterization for hemodynamic assessment.      Follow-up:   1 and 3 months remote transmission  Agree with Dr. Acosta that catheterization would be useful  At minimum 6 months clinic interrogation with ECG, echocardiogram, and 24hr heart rhythm  monitor  Cardiac medications:    Digoxin  Enalapril  Lasix  Metoprolol XL (Not taking, restarted todasy)  Sotalol (Not taking)  Warfarin    Please contact us if he has any questions or concerns.  Our clinic from his [phone] during office hours. For urgent night and weekend concerns, call [phone] and ask for the pediatric cardiologist on call to be paged.

## 2023-09-07 NOTE — PROGRESS NOTES
"09/07/2023    Patient Name: LEVON GANDHI  YOB: 1975  [facility] Number: [id number]    Kevin Chaves MD  [address]  [facility] [address]    MIRIAM Garcia MD  [address] [facility]   [address]  [place] [address]    Dear Dr. Chaves and Jose:    It is a pleasure to see Levon at our main campus adult congenital cardiology clinic to evaluate tricuspid atresia.    Levon is a 48 y.o. Male with a complex past medical history. To summarize, his diagnoses are as follows:  1. 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   - History of a classic Roger shunt and a 12 mm conduit from the right atrium to the left pulmonary artery along with a Damus Judy Stansel operation    - now with mild native and brigette-aortic insufficiency   - lateral tunnel Fontan with a 16 mm ring Goretex graft from the left pulmonary artery to the right pulmonary artery.   - elevated central venous pressures with mild desaturation at least partially due to collateral vessels - improved after cath 2010 but still an issue.   - mild mitral regurgitation    - Last liver US September 2020  2. 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     - arrhythmias typically manifest as "back pain"   - nonsustained VT, Noninvasive EP study (NIEPS) negative 2016  3. Varicose veins followed in the past by vascular Medicine.  Improved pain and swelling.  Of note, the patient was accessed via the right femoral vein without difficulty at the 2010 catheterization.  4. No evidence of protein-losing enteropathy.    5. obstructive sleep apnea, noncompliant with CPAP  6.  Noncompliant with coumadin INR monitoring  7.  Low normal to mildly decreased systemic ventricular function    Discussion:  For a 48-year-old Fontan, he looks great.  However, I certainly have concerns about his current " symptoms.  I do think a lot of these symptoms are more related to his job than to his heart disease, but his heart disease certainly does not help.  He has significant lower extremity varices, and venous insufficiency is very common in Fontan patients.  Standing on hard service for a long time likely worsens his leg pain.  That said, exercise, especially exercise that strengthens the lower extremities, is very good for Fontan patients.  I do think he would qualify for disability given his cyanosis and significant heart disease, and I would support an application.  I think his numbness in his hands is more related to his job in his heart.  At the end of the day, I think a catheterization would be very helpful.  We may want to add another pulmonary vasodilator.    He is at risk for thromboembolic phenomena.  However, he has not been very compliant with his INR checks, and he is subtherapeutic today.  I think he would benefit from a switch to Eliquis or Xarelto, and there is emerging data on Fontan patients with this medication.  Either way, we would want to hold before his cardiac catheterization.  He will continue the Coumadin for now, but stop it 5 days before the catheterization.  After the catheterization, we will consider switching him to a different anticoagulant.    My plan is as follows:  1.  Restart CPAP for obstructive sleep apnea.  2.  Continue current medications  3.  Check baseline blood work today  4.  Regular follow-up with hepatology.  5.  Repeat cardiac catheterization - OK to hold coumadin for 5 days before.  6.  Consider application for disability.  7.  Consider switch to DOAC after cath, consider increasing diuretic.    Interval history:     Patient has a number of complaints.  To be clear, he wonders if his job contributes.  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  Some days are quite easy, but other days can be very demanding.  Complaints include:  1. Feels like his strength has been decreased over the past several months, and he has been more tired.  He has started swimming for exercise, and he feels like that has helped his fatigue.  He has not been wearing his CPAP.  2. He is having a lot more leg pain although that seems to be improving now that they have padded Mats to stand on at work.  He continues with significant varices in his legs.    3. He is had some numbness in his fingers on both hands that seems to be positional.  No worsening dyspnea on exertion.  He does get out of breath if he runs, but he does not run often.  No syncope or near-syncope.  No palpitations.  Some mild edema in his legs, but this is chronic.  Some chest tightness and shortness of breath when he bends over.  He has been off the metoprolol for a month or so and off sotalol for several months.  He has been otherwise compliant with his medications.    On July 7, 2010 he underwent a cardiac catheterization:   1. Complex univentricular cardiac defect with superior inferior ventricles with double inlet left ventricle (atrioventricular connection not defined at catheterization), status post Tsuiq-Oenr-Zpcshor connection and intracardiac lateral tunnel Fontan.   2. Moderate neoaortic (pulmonary) valve insufficiency.   3. Left aorta.   4. 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   5. Elevated central venous pressure (mean 19), transpulmonary gradient 4-5 mmHg.    Past Medical History:   Diagnosis Date    Asthma     Atrial flutter     Cyanosis     Heart murmur     Intra-atrial reentrant tachycardia     MIGUEL (obstructive sleep apnea) 10/23/2014    TGA (transposition of great arteries)     Tricuspid atresia     VSD (ventricular septal defect)      Past Surgical History:   Procedure Laterality Date    CARDIAC CATHETERIZATION      FONTAN  PROCEDURE, EXTRACARDIAC      REPLACEMENT OF PACEMAKER Right 10/8/2020    Procedure: REPLACEMENT-PACEMAKER GENERATOR;  Surgeon: Chang Garcia MD;  Location: [facility] OR [address] [place] [address];  Service: Cardiovascular;  Laterality: Right;     Family History   Problem Relation Age of Onset    No Known Problems Mother     No Known Problems Father     No Known Problems Sister     No Known Problems Brother     No Known Problems Maternal Grandmother     Heart disease Maternal Grandfather     No Known Problems Paternal Grandmother     Diabetes Paternal Grandfather     No Known Problems Maternal Aunt     No Known Problems Maternal Uncle     Diabetes Paternal Aunt     No Known Problems Paternal Uncle     Atrial Septal Defect Neg Hx     Anemia Neg Hx     Arrhythmia Neg Hx     Asthma Neg Hx     Clotting disorder Neg Hx     Fainting Neg Hx     Heart attack Neg Hx     Heart failure Neg Hx     Hyperlipidemia Neg Hx     Hypertension Neg Hx     Stroke Neg Hx      Social History     Socioeconomic History    Marital status: Legally    Tobacco Use    Smoking status: Never    Smokeless tobacco: Never   Substance and Sexual Activity    Alcohol use: No    Drug use: No   Social History Narrative    He is working at [employer].     Current Outpatient Medications on File Prior to Visit   Medication Sig Dispense Refill    digoxin (LANOXIN) 250 mcg tablet Take 1 tablet (0.25 mg total) by mouth every morning. 30 tablet 3    enalapril (VASOTEC) 10 MG tablet Take 1 tablet (10 mg total) by mouth 2 (two) times daily. 60 tablet 3    furosemide (LASIX) 20 MG tablet TAKE 1 TABLET (20 MG TOTAL) BY MOUTH ONCE DAILY. 60 tablet 6    sildenafil (REVATIO) 20 mg Tab Take 1 tablet (20 mg total) by mouth 2 (two) times daily. 60 tablet 3    sotaloL (BETAPACE) 240 MG Tab TAKE 1 TABLET (240 MG TOTAL) BY MOUTH 2 (TWO) TIMES DAILY. 180 tablet 3    warfarin (COUMADIN) 2 MG tablet Take 10 mg (6mg + 2 (2mg) tabs every Saturday and 8mg (6mg +2 mg) all other days as  "directed 35 tablet 3    warfarin (COUMADIN) 6 MG tablet TAKE 10MG (6MG + 2 (2MG) TABS EVERY SATURDAY AND 8MG (6MG + 2MG) TABS ALL OTHER DAYS AS DIRECTED 30 tablet 3    metoprolol succinate (TOPROL-XL) 100 MG 24 hr tablet TAKE 3 TABLETS (300 MG TOTAL) BY MOUTH ONCE DAILY. (Patient not taking: Reported on 9/7/2023) 270 tablet 3     No current facility-administered medications on file prior to visit.     Review of patient's allergies indicates:  No Known Allergies     Vitals:    09/07/23 0930   BP: (!) 140/64   BP Location: Right arm   Patient Position: Sitting   Pulse: 75   SpO2: (!) 84%   Weight: 72.7 kg (160 lb 4.4 oz)   Height: 5' 2.99" (1.6 m)     BP (!) 140/64 (BP Location: Right arm, Patient Position: Sitting)   Pulse 75   Ht 5' 2.99" (1.6 m)   Wt 72.7 kg (160 lb 4.4 oz)   SpO2 (!) 84%   BMI 28.40 kg/m²   In general, his baseline ruddiness is stable.  He is a white male in no apparent distress. Head is normocephalic and atraumatic. The eyes, nares, and oropharynx are clear. The neck is supple without obvious jugular venous distention or lymphadenopathy, and his face does not look swollen. Lungs are clear to auscultation bilaterally. The chest is symmetrical. Multiple well-healed surgical scars are noted. The pacemaker is palpated in the right upper chest. The area is nontender without evidence of infection. The precordium is quiet. First heart sound is normal. A loud single second heart sound is auscultated. A grade 2/6 systolic ejection murmur is auscultated. No diastolic murmurs or gallops are heard. The abdominal exam reveals a liver edge palpated about 2 cm below the right costal margin. It is not pulsatile. He abdomen is soft and nontender without evidence of ascites. I could not palpate his spleen. There is no significant clubbing and no obvious cyanosis. He has good pulses in his legs bilaterally. There is trivial edema in the mild pitting edema in the right leg up to about the mid shin. He does have " varicose veins.  No palpable cords or calf tenderness.    I personally reviewed the following studies:  Results for orders placed during the hospital encounter of 09/07/23    Echo    Interpretation Summary  CONGENITAL CARDIAC HISTORY:  Tricuspid atresia, hypoplastic right ventricle with ventricular septal defect and transposition of the great arteries.  INTERVENTION:  S/P Inkio-Hrdd-Xdegszs and lateral tunnel Fontan.  S/P Coil and Amplatzer occlusion of venovenous collaterals (detailed description of anatomy in cath report July 2010).    SEGMENTAL CARDIAC CONNECTIONS (previously demonstrated):  Abdominal situs is solitus.  Atrial situs is normal.  Tricuspid atresia.  Abnormal mitral valve with tricuspid atresia.  Very dilated, globular left ventricle with very hypoplastic right ventricle.  D-transposed great vessels.  Aortic valve is normal and pulmonic valve is normal. D-loop.  Cardiac position is Levocardia.      IMPRESSION -  Technically difficult imaging -  Images consistent with tricuspid atresia, d-TGA, VSD and RV hypoplasia S/P DKS and lateral tunnel Fontan.  Normal intrahepatic inferior vena cava and hepatic veins joining lateral tunnel Fontan and slow moving spontaneous contrast, laminar flow and no obvious thrombus in IVC, hepatic veins, proximal lateral tunnel or SVC.  Unable to demonstrate remaining segments of Fontan circuit and pulmonary arteries with very limited suprasternal windows.  Small functional right atrium.  Unrestricted bidirectional flow at atrial septum.  Limited images demonstrating no obvious obstruction of pulmonary venous return to the dilated left atrium.  The left atrial volume index is severely enlarged measuring 108 ml/m2.  Large mitral annulus.  Mild mitral valve insufficiency.  Dilated left ventricle - severe.  Qualitatively low normal left (single) ventricle systolic function with EF estimated 50 -55% from apical views.  Limited images demonstrate unrestricted flow at VSD to  anterior aorta and small subaortic chamber (Right ventricle).  Anterior native aortic valve with no stenosis and trivial to mild insufficiency.  Posteriorly positioned, mildly sclerotic, trileaflet pulmonary valve committed to the left ventricle with no left ventricular outflow obstruction, mild insufficiency and no valvar stenosis.  Good imaging of the DKS anastomosis with no evidence of obstruction in either limb.    Lab Results   Component Value Date    WBC 5.49 09/07/2023    HGB 17.9 09/07/2023    HCT 54.5 (H) 09/07/2023    MCV 94 09/07/2023    PLT 82 (L) 09/07/2023       CMP  Sodium   Date Value Ref Range Status   09/07/2023 141 136 - 145 mmol/L Final     Potassium   Date Value Ref Range Status   09/07/2023 4.9 3.5 - 5.1 mmol/L Final     Chloride   Date Value Ref Range Status   09/07/2023 104 95 - 110 mmol/L Final     CO2   Date Value Ref Range Status   09/07/2023 28 23 - 29 mmol/L Final     Glucose   Date Value Ref Range Status   09/07/2023 94 70 - 110 mg/dL Final     BUN   Date Value Ref Range Status   09/07/2023 14 6 - 20 mg/dL Final     Creatinine   Date Value Ref Range Status   09/07/2023 0.8 0.5 - 1.4 mg/dL Final     Calcium   Date Value Ref Range Status   09/07/2023 9.8 8.7 - 10.5 mg/dL Final     Total Protein   Date Value Ref Range Status   09/07/2023 7.6 6.0 - 8.4 g/dL Final     Albumin   Date Value Ref Range Status   09/07/2023 4.4 3.5 - 5.2 g/dL Final     Total Bilirubin   Date Value Ref Range Status   09/07/2023 2.5 (H) 0.1 - 1.0 mg/dL Final     Comment:     For infants and newborns, interpretation of results should be based  on gestational age, weight and in agreement with clinical  observations.    Premature Infant recommended reference ranges:  Up to 24 hours.............<8.0 mg/dL  Up to 48 hours............<12.0 mg/dL  3-5 days..................<15.0 mg/dL  6-29 days.................<15.0 mg/dL       Alkaline Phosphatase   Date Value Ref Range Status   09/07/2023 100 55 - 135 U/L Final     AST    Date Value Ref Range Status   09/07/2023 32 10 - 40 U/L Final     ALT   Date Value Ref Range Status   09/07/2023 29 10 - 44 U/L Final     Anion Gap   Date Value Ref Range Status   09/07/2023 9 8 - 16 mmol/L Final     eGFR   Date Value Ref Range Status   09/07/2023 >60.0 >60 mL/min/1.73 m^2 Final      Latest Reference Range & Units Most Recent   Iron 45 - 160 ug/dL 159  9/7/23 11:43   TIBC 250 - 450 ug/dL 425  9/7/23 11:43   Saturated Iron 20 - 50 % 37  9/7/23 11:43   UIBC 110 - 370 ug/dl 325  1/31/13 15:19   Transferrin 200 - 375 mg/dL 287  9/7/23 11:43   Ferritin 20.0 - 300.0 ng/mL 125  9/7/23 11:43   Protime 9.0 - 12.5 sec 14.0 (H)  9/7/23 11:43   INR 0.8 - 1.2  1.3 (H)  9/7/23 11:43   (H): Data is abnormally high    GGT   Date Value Ref Range Status   09/07/2023 122 (H) 8 - 55 U/L Final     AFP   Date Value Ref Range Status   09/07/2023 2.3 0.0 - 8.4 ng/mL Final     Comment:     The testing method is a chemiluminescent microparticle immunoassay   manufactured by [employer] and performed on the    or   Kampyle system. Values obtained with different assay manufacturers   for   methods may be different and cannot be used interchangeably.       BNP   Date Value Ref Range Status   09/07/2023 243 (H) 0 - 99 pg/mL Final     Comment:     Values of less than 100 pg/ml are consistent with non-CHF populations.     TSH   Date Value Ref Range Status   09/07/2023 1.918 0.400 - 4.000 uIU/mL Final     LE US 9/10/20:  No evidence of right lower extremity DVT.  No evidence of left lower extremity DVT.  Right greater saphenous superficial vein reflux is present.  Right common and deep femoral vein reflux is present.    Thank you for referring this patient to our clinic. Please call with any questions.    Sincerely,        Dereje Acosta MD  Pediatric Cardiology  Adult Congenital Heart Disease  Pediatric Heart Failure and Transplantation  [facility] [address] [place]  [place]  [address]  [phone]

## 2023-09-13 ENCOUNTER — PATIENT MESSAGE (OUTPATIENT)
Dept: PEDIATRIC CARDIOLOGY | Facility: CLINIC | Age: 48
End: 2023-09-13
Payer: COMMERCIAL

## 2023-09-20 ENCOUNTER — TELEPHONE (OUTPATIENT)
Dept: PEDIATRIC CARDIOLOGY | Facility: CLINIC | Age: 48
End: 2023-09-20
Payer: COMMERCIAL

## 2023-09-20 NOTE — TELEPHONE ENCOUNTER
----- Message from Raza Lester Jr., MD sent at 9/8/2023  3:36 PM CDT -----  Regarding: RE: diagnostic cath  OK to schedule  ----- Message -----  From: Dereje Acosta MD  Sent: 9/7/2023  11:23 PM CDT  To: aRza Lester Jr., MD; #  Subject: diagnostic cath                                  Can we please set him up for a diagnostic cath?  Adult Fontan, last cath 2010.  Can't get MRI due to pacemaker.

## 2023-09-29 ENCOUNTER — TELEPHONE (OUTPATIENT)
Dept: PEDIATRIC CARDIOLOGY | Facility: HOSPITAL | Age: 48
End: 2023-09-29
Payer: COMMERCIAL

## 2023-09-29 DIAGNOSIS — I48.91 ATRIAL FIBRILLATION, UNSPECIFIED TYPE: ICD-10-CM

## 2023-09-29 DIAGNOSIS — Q22.4 TRICUSPID ATRESIA WITH D-TRANSPOSITION OF GREAT ARTERIES: Primary | ICD-10-CM

## 2023-09-29 DIAGNOSIS — Q20.3 TRICUSPID ATRESIA WITH D-TRANSPOSITION OF GREAT ARTERIES: Primary | ICD-10-CM

## 2023-09-29 DIAGNOSIS — Z98.890 S/P FONTAN PROCEDURE: ICD-10-CM

## 2023-09-29 LAB
OHS CV EVENT MONITOR DAY: 1
OHS CV HOLTER AFIB AVERAGE HR: 83 BPM
OHS CV HOLTER AFIB MAX HR: 121 BPM
OHS CV HOLTER AFIB MIN HR: 66 BPM
OHS CV HOLTER HOOKUP DATE: NORMAL
OHS CV HOLTER HOOKUP TIME: NORMAL
OHS CV HOLTER LENGTH DECIMAL HOURS: 25
OHS CV HOLTER LENGTH HOURS: 1
OHS CV HOLTER LENGTH MINUTES: 0
OHS CV HOLTER SCAN DATE: NORMAL
OHS CV HOLTER SINUS AVERAGE HR: 83 BPM
OHS CV HOLTER SINUS MAX HR: 121 BPM
OHS CV HOLTER SINUS MIN HR: 66 BPM
OHS CV HOLTER STUDY END DATE: NORMAL
OHS CV HOLTER STUDY END TIME: NORMAL

## 2023-09-29 NOTE — TELEPHONE ENCOUNTER
Called to discuss recent Zio monitor findings. He appears to spend much of the time either in atrial fibrillation or IART. Some rhythm appears more sinus or normally paced though.     At recent pacemaker check (9/7/2023), there were frequent brief (ranging from 7secs to 1min 51secs) high ventricular rate episodes on-and-off since 8/4/2023. In hindsite, while the ECG was read as normal pacemaker function, it appears that he could have been in an atrial arrhythmia that the pacemaker under-sensed and was pacing accordingly. The pacemaker may be undersensing his arrhythmia, and letting us under-appreciate this burden. He really didn't have many symptoms during this time.     At his last visit, he had been noted that he has CPAP for MIGUEL, but reports that he doesn't use it. He denies any palpitations. He hasn't taken his sotalol in several years (he had taken himself off, I had not discontinued it), he reports being unsure if I should have been on the sotalol, but never asked. He also has not taken his metoprolol for at least 1 month (though I suspect longer). I suspect the absence of these treatments as well as his predilection for atrial arrhythmia, has increased his likelihood for atrial arrhythmia.    I called him regarding the aforementioned results. He is currently taking metoprolol and digoxin. He is taking coumadin. As such, he appears rate controlled and anticoagulated. He states he feels completely asymptomatic and not at all how he usually does with an atrial arrhythmia.    I instructed him that if he at all feels unwell that he should go to the ER. When he is back at home, he should send a remote transmission. He feels very well presently. Even if he continues to feel well, I would like to plan on a cardioversion this week. He is set to have a hemodynamic catheterization in several weeks. I would ideally like him in a normal rhythm for this.        Shlomo Boykin MD  Pediatric & Adult-Congenital  Electrophysiology    Please contact us if he has any questions or concerns.  Our clinic from his [phone] during office hours. For urgent night and weekend concerns, call [phone] and ask for the pediatric cardiologist on call to be paged.

## 2023-10-03 ENCOUNTER — PATIENT MESSAGE (OUTPATIENT)
Dept: PEDIATRIC CARDIOLOGY | Facility: CLINIC | Age: 48
End: 2023-10-03
Payer: COMMERCIAL

## 2023-10-03 DIAGNOSIS — I49.5 SINUS NODE DYSFUNCTION: ICD-10-CM

## 2023-10-03 DIAGNOSIS — I48.92 ATRIAL FLUTTER, UNSPECIFIED TYPE: Primary | ICD-10-CM

## 2023-10-03 DIAGNOSIS — Z95.0 CARDIAC PACEMAKER IN SITU: ICD-10-CM

## 2023-10-04 ENCOUNTER — TELEPHONE (OUTPATIENT)
Dept: PEDIATRIC CARDIOLOGY | Facility: CLINIC | Age: 48
End: 2023-10-04
Payer: COMMERCIAL

## 2023-10-04 ENCOUNTER — ANESTHESIA EVENT (OUTPATIENT)
Dept: MEDSURG UNIT | Facility: HOSPITAL | Age: 48
End: 2023-10-04
Payer: COMMERCIAL

## 2023-10-04 NOTE — H&P
Name: Marcello Briggs  MRN: [id number]  : 1975      Subjective:   CC: Single Ventricular / Fontan Physiology    HPI:    Marcello Briggs is a 48 y.o. male who presents to [facility] for JUNIE/DC-Cardioversion for suspected Afib. 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     Since last clinic visit, Marcello reports no change in symptoms or clinical status. He denies palpations or fatigue. He has not had syncope or shortness of breath. He has not been taking sotolol or metoprolol, as described in his last clinic note. He last took warfarin two days ago (as prescribed).    Recent holter monitor demonstrated times of irregular ventricular rates, inconsistent with his known sinus node dysfunction. Close examination of his previous remote device interrogations and in-person device interrogation today is overall consistent with intermittent Afib that he appears to be in today. Interpretation is complicated by atrial standstill at baseline, but there appears to be fine fibrillation on the atrial lead and again variable (but slow) ventricular response when atrial pacing is not active. It appears that during atrial fibrillation, atrial signals were under the sensitivity threshold,  leading to atrial pacing during it. Since the device in set to DDD, at times of slower AV conduction, AP/ was noted, complicating the eventual diagnosis of atrial fibrillation.       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  Cardiac/Surgical Hx  History of a classic Roger shunt and a 12 mm conduit from the right atrium to the left pulmonary artery along with a Damus Judy Stansel operation   now with mild native and brigette-aortic insufficiency  lateral tunnel Fontan with a 16 mm ring Goretex graft from the left pulmonary artery to the right pulmonary  "artery.  elevated central venous pressures with mild desaturation at least partially due to collateral vessels - improved after cath 2010 but still an issue.  mild mitral regurgitation   Mildly decreased systemic ventricular function  Related:  Last liver US September 2020  No evidence of protein-losing enteropathy.  Recurrent intra-atrial reentrant tachycardia   arrhythmias typically manifest as "back pain"  Sinus node dysfunction   most recent pacemaker/ICD change 10/08/2020  Nonsustained VT  Noninvasive EP study (NIEPS) negative 2016  Varicose veins   followed in the past by vascular Medicine.    Improved pain and swelling.    Of note, the patient was accessed via the right femoral vein without difficulty at the 2010 catheterization.  Obstructive sleep apnea      Family Hx:  No known family history of congenital heart defects or cardiac surgeries in childhood.  No known family members with pacemakers or defibrillators.  No known inherited channelopathies or cardiomyopathies.  No known hx of sudden cardiac death or heart transplant.  No No known heart attack in someone less than 50yoa.    Social Hx:  Lives in [place], [place].    Review of Systems:  See HPI  ALL: See below.    Medications & Allergy:  No current facility-administered medications on file prior to encounter.     Current Outpatient Medications on File Prior to Encounter   Medication Sig Dispense Refill    digoxin (LANOXIN) 250 mcg tablet Take 1 tablet (0.25 mg total) by mouth every morning. 30 tablet 3    enalapril (VASOTEC) 10 MG tablet Take 1 tablet (10 mg total) by mouth 2 (two) times daily. 60 tablet 3    furosemide (LASIX) 20 MG tablet Take 1 tablet (20 mg total) by mouth once daily. 30 tablet 11    metoprolol succinate (TOPROL-XL) 100 MG 24 hr tablet Take 3 tablets (300 mg total) by mouth once daily. 270 tablet 3    sildenafil (REVATIO) 20 mg Tab Take 1 tablet (20 mg total) by mouth 2 (two) times daily. 60 tablet 3    sotaloL (BETAPACE) 240 MG " Tab TAKE 1 TABLET (240 MG TOTAL) BY MOUTH 2 (TWO) TIMES DAILY. 180 tablet 3    warfarin (COUMADIN) 2 MG tablet Take 10 mg (6mg + 2 (2mg) tabs every Saturday and 8mg (6mg +2 mg) all other days as directed 35 tablet 3    warfarin (COUMADIN) 6 MG tablet TAKE 10MG (6MG + 2 (2MG) TABS EVERY SATURDAY AND 8MG (6MG + 2MG) TABS ALL OTHER DAYS AS DIRECTED 30 tablet 3       Review of patient's allergies indicates:  No Known Allergies       Objective:   Vitals:  There were no vitals filed for this visit.      There is no height or weight on file to calculate BMI.  There is no height or weight on file to calculate BSA.    Exam:  GEN: No acute distress, Normal appearing  EYE: Anicteric sclerae  ENT: No drainage, Moist mucous membranes  PULM: Normal work of breathing;  Clear to auscultation bilaterally, Good air movement throughout.  CV: No chest pain;   Single S2,   I/VI systolic murmur;   No rubs or gallops;  EXT: No cyanosis, Mild RLE edema. Significant varices noted (unchanged)   2+ radial and dorsalis pedis pulses bilaterally  ABD: Soft, Non-distended, Non-tender,    Liver edge 3cm below RCM;  Normal bowel sounds  DERM: No rashes  NEUR: Normal gait, Grossly normal tone.  PSY: Normal mood and affect      Results / Data:   ECG:   (09/07/2023) - AV sequentially paced rhythm and competing junctional rhythm.  (08/18/2022) - Atrial paced rhythm, intraventricular conduction delay.  (10/22/2020) - Atrial paced rhythm, intraventricular conduction delay.    Holter/Zio:   (08/18/2022)  Pending, placed today.    (09/18/2020)  Predominant rhythm is AV sequential pacing  Rare atrial/ventricular ectopy  20 episodes of slow SVT (longest 9 beats fastest 133 bpm)  One 4 beat episode of slow VT (max rate 156 bpm) and one ventricular triplet  No diary symptoms    (9/18/20)  Predominant rhythm is AV sequential pacing  Rare atrial/ventricular ectopy  20 episodes of slow SVT (longest 9 beats fastest 133 bpm)  One 4 beat episode of slow VT (max rate  156 bpm) and one ventricular triplet  No diary symptoms    Echocardiogram:   (09/07/2023)   CONGENITAL CARDIAC HISTORY:  Tricuspid atresia, hypoplastic right ventricle with ventricular septal defect and transposition of the great arteries.  INTERVENTION:  S/P [id number] and lateral tunnel Fontan.  S/P Coil and Amplatzer occlusion of venovenous collaterals (detailed description of anatomy in cath report July 2010).     SEGMENTAL CARDIAC CONNECTIONS (previously demonstrated):  Abdominal situs is solitus.  Atrial situs is normal.  Tricuspid atresia.  Abnormal mitral valve with tricuspid atresia.  Very dilated, globular left ventricle with very hypoplastic right ventricle.  D-transposed great vessels.  Aortic valve is normal and pulmonic valve is normal. D-loop.  Cardiac position is Levocardia.        IMPRESSION -  Technically difficult imaging -  Images consistent with tricuspid atresia, d-TGA, VSD and RV hypoplasia S/P DKS and lateral tunnel Fontan.  Normal intrahepatic inferior vena cava and hepatic veins joining lateral tunnel Fontan and slow moving spontaneous contrast, laminar flow and no obvious thrombus in IVC, hepatic veins, proximal lateral tunnel or SVC.  Unable to demonstrate remaining segments of Fontan circuit and pulmonary arteries with very limited suprasternal windows.  Small functional right atrium.  Unrestricted bidirectional flow at atrial septum.  Limited images demonstrating no obvious obstruction of pulmonary venous return to the dilated left atrium.  The left atrial volume index is severely enlarged measuring 108 ml/m2.  Large mitral annulus.  Mild mitral valve insufficiency.  Dilated left ventricle - severe.  Qualitatively low normal left (single) ventricle systolic function with EF estimated 50 -55% from apical views.  Limited images demonstrate unrestricted flow at VSD to anterior aorta and small subaortic chamber (Right ventricle).  Anterior native aortic valve with no stenosis and  trivial to mild insufficiency.  Posteriorly positioned, mildly sclerotic, trileaflet pulmonary valve committed to the left ventricle with no left ventricular outflow obstruction, mild insufficiency and no valvar stenosis.  Good imaging of the DKS anastomosis with no evidence of obstruction in either limb.  Technically difficult suprasternal imaging of the aorta demonstrates left aortic arch branching with no evidence of coarctation.  No pericardial effusion.    (08/18/2022)  CONGENITAL CARDIAC HISTORY:  Tricuspid atresia, hypoplastic right ventricle with ventricular septal defect and transposition of the great arteries.  INTERVENTION:  S/P [id number] and lateral tunnel Fontan.  S/P Coil and Amplatzer occlusion of venovenous collaterals (detailed description of anatomy in cath report July 2010).     SEGMENTAL CARDIAC CONNECTIONS:  Abdominal situs is solitus.   Atrial situs is normal.   Tricuspid atresia.   Abnormal mitral valve with tricuspid atresia.   Very dilated, globular left ventricle with very hypoplastic right ventricle.   D-transposed great vessels.   Aortic valve is normal and pulmonic valve is normal. D-loop.   Cardiac position is Levocardia.     IMPRESSION  Technically difficult imaging -  Images consistent with tricuspid atresia, d-TGA, VSD and RV hypoplasia S/P DKS and lateral tunnel Fontan.  Qualitative impression of improved LV function compared to previous study with no other significant changes noted.  Normal intrahepatic inferior vena cava and hepatic veins joining lateral tunnel Fontan and slow moving spontaneous contrast, laminar flow and no obvious thrombus in IVC, hepatic veins, proximal lateral tunnel or SVC.  Unable to demonstrate remaining segments of Fontan circuit and pulmonary arteries with very limited suprasternal windows.  Small functional right atrium.  Unrestricted bidirectional flow at atrial septum.  The left atrial volume index is severely enlarged measuring 101 ml/m2.    Large mitral annulus.  At least mild mitral valve insufficiency.  Dilated left ventricle - severe.  Qualitatively lborderline left (single) ventricle systolic function with EF estimated 45 -50% from apical views.  Limited images suggest unrestricted flow at VSD to anterior aorta and small subaortic chamber (Right ventricle).  Anterior native aortic valve with no stenosis and trivial to mild insufficiency.  Posteriorly positioned trileaflet pulmonary valve committed to the left ventricle with no left ventricular outflow obstruction, trivial to mild insufficiency and no valvar stenosis.  Good imaging of the DKS anastomosis with no evidence of obstruction in either limb.  No pericardial effusion.      Pacemaker Interrogation:  (09/07/2023)    Mode: DDDR Lower limit rate: 75 bpm Upper tracking rate: 130 bpm    Reprogramming comments: No changes this session    General comments: Device interrogation and lead testing performed. Device and leads WNL. 59 HVR's, likely due to at least intermittent atrial fibrillation.     (06/14/2023)  REMOTE transmission received and data reviewed. Device and leads WNL. Battery longevity 2.0-2.6 yrs Atrial paced 40 % Ventricular paced 42 % 2 AMS- 1 EGM- Noise artifact noted 16 VHRs- Idioventricular rhythm with intermittent V-pacing    (08/18/2022, in clinic)  Permanent Programming   RA Lead: 2.0 V @ 1 ms. Sensitivity: 2 mV.   RV Lead: 1.375 Auto V @ 0.5 ms. Sensitivity: 2 mV.   Pacemaker Generator and Leads meet standard of FDA approval.   Chamber type: dual.   Mode: DDDR   Lower limit rate: 75 bpm   Upper tracking rate: 130 bpm   AV Delay Fixed: 200 msec   PV Delay Fixed: 200 msec  Battery voltage: 2.99 V  Estimated longevity: 3.9 - 4.8 years .   Magnet Rate: 100 ppm  Leads  RA Lead:        P/R-wave: (none)Paced mV       Lead Impedance: 340 Ohms       Paced: 82%   RV Lead:        Impedance: 350 Ohms       Paced: 78%   Thresholds  RA Lead: 1 V @ 1 ms. Configuration: unipolar.   RV Lead:  1.25 V @ 0.5 ms. Configuration: bipolar.  Reprogramming comments:  No changes this session   General comments:   Device interrogation and lead testing performed. Device and leads WNL.  HVR x 16. Longest 10 minutes 24 seconds;  Available EGM's show VS episodes; Minimal data on atrial EGM - occasional AP or noise artifact noted.       Catheterization: (July 7, 2010)   1. Complex univentricular cardiac defect with superior inferior ventricles with double inlet left ventricle (atrioventricular connection not defined at catheterization), status post Tfyfj-Hcqi-Nusummh connection and intracardiac lateral tunnel Fontan.   2. Moderate neoaortic (pulmonary) valve insufficiency.   3. Left aorta.   4. 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   5. Elevated central venous pressure (mean 19), transpulmonary gradient 4-5 mmHg.    Assessment / Plan:   Marcello Briggs is a 48 y.o. male who presents to [facility] for JUNIE/DC-Cardioversion for suspected Afib. 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    He will first undergo JUNIE to confirm no thrombus is present in the atria or Fontan circuit. Once confirmed, we will perform DC cardioversion to correct atrial fibrillation. Risks and benefits, and alternatives, were discussed and he has consented to proceed.      David Weiland, MD  Pediatric Cardiology and Electrophysiology  [facility] [address] [place]  [address]  Phone [phone], Fax [phone]

## 2023-10-05 ENCOUNTER — HOSPITAL ENCOUNTER (OUTPATIENT)
Facility: HOSPITAL | Age: 48
Discharge: HOME | End: 2023-10-05
Attending: PEDIATRICS | Admitting: STUDENT IN AN ORGANIZED HEALTH CARE EDUCATION/TRAINING PROGRAM
Payer: COMMERCIAL

## 2023-10-05 ENCOUNTER — ANESTHESIA (OUTPATIENT)
Dept: MEDSURG UNIT | Facility: HOSPITAL | Age: 48
End: 2023-10-05
Payer: COMMERCIAL

## 2023-10-05 VITALS
SYSTOLIC BLOOD PRESSURE: 125 MMHG | TEMPERATURE: 98 F | WEIGHT: 162 LBS | HEIGHT: 63 IN | HEART RATE: 60 BPM | DIASTOLIC BLOOD PRESSURE: 58 MMHG | BODY MASS INDEX: 28.7 KG/M2 | RESPIRATION RATE: 15 BRPM | OXYGEN SATURATION: 91 %

## 2023-10-05 DIAGNOSIS — I48.91 AF (ATRIAL FIBRILLATION): Primary | ICD-10-CM

## 2023-10-05 DIAGNOSIS — I49.5 SINUS NODE DYSFUNCTION: ICD-10-CM

## 2023-10-05 DIAGNOSIS — I48.92 ATRIAL FLUTTER BY ELECTROCARDIOGRAM: ICD-10-CM

## 2023-10-05 DIAGNOSIS — Z95.0 CARDIAC PACEMAKER IN SITU: ICD-10-CM

## 2023-10-05 DIAGNOSIS — I48.92 ATRIAL FLUTTER, UNSPECIFIED TYPE: ICD-10-CM

## 2023-10-05 LAB — BSA FOR ECHO PROCEDURE: 1.81 M2

## 2023-10-05 PROCEDURE — 93316 ANESTHESIA PROBE PLACEMENT: ICD-10-PCS | Mod: 59,,, | Performed by: ANESTHESIOLOGY

## 2023-10-05 PROCEDURE — 25000003 PHARM REV CODE 250: Performed by: NURSE ANESTHETIST, CERTIFIED REGISTERED

## 2023-10-05 PROCEDURE — 37000009 HC ANESTHESIA EA ADD 15 MINS: Performed by: STUDENT IN AN ORGANIZED HEALTH CARE EDUCATION/TRAINING PROGRAM

## 2023-10-05 PROCEDURE — D9220A PRA ANESTHESIA: ICD-10-PCS | Mod: CRNA,,, | Performed by: NURSE ANESTHETIST, CERTIFIED REGISTERED

## 2023-10-05 PROCEDURE — D9220A PRA ANESTHESIA: Mod: ANES,,, | Performed by: ANESTHESIOLOGY

## 2023-10-05 PROCEDURE — 63600175 PHARM REV CODE 636 W HCPCS: Performed by: NURSE ANESTHETIST, CERTIFIED REGISTERED

## 2023-10-05 PROCEDURE — 92960 CARDIOVERSION ELECTRIC EXT: CPT | Performed by: STUDENT IN AN ORGANIZED HEALTH CARE EDUCATION/TRAINING PROGRAM

## 2023-10-05 PROCEDURE — 93316 ECHO TRANSESOPHAGEAL: CPT | Mod: 59,,, | Performed by: ANESTHESIOLOGY

## 2023-10-05 PROCEDURE — D9220A PRA ANESTHESIA: ICD-10-PCS | Mod: ANES,,, | Performed by: ANESTHESIOLOGY

## 2023-10-05 PROCEDURE — 92960 PR CARDIOVERSION, ELECTIVE;EXTERN: ICD-10-PCS | Mod: ,,, | Performed by: STUDENT IN AN ORGANIZED HEALTH CARE EDUCATION/TRAINING PROGRAM

## 2023-10-05 PROCEDURE — D9220A PRA ANESTHESIA: Mod: CRNA,,, | Performed by: NURSE ANESTHETIST, CERTIFIED REGISTERED

## 2023-10-05 PROCEDURE — 37000008 HC ANESTHESIA 1ST 15 MINUTES: Performed by: STUDENT IN AN ORGANIZED HEALTH CARE EDUCATION/TRAINING PROGRAM

## 2023-10-05 PROCEDURE — 92960 CARDIOVERSION ELECTRIC EXT: CPT | Mod: ,,, | Performed by: STUDENT IN AN ORGANIZED HEALTH CARE EDUCATION/TRAINING PROGRAM

## 2023-10-05 RX ORDER — MIDAZOLAM HYDROCHLORIDE 1 MG/ML
2 INJECTION INTRAMUSCULAR; INTRAVENOUS ONCE AS NEEDED
Status: DISCONTINUED | OUTPATIENT
Start: 2023-10-05 | End: 2023-10-06 | Stop reason: HOSPADM

## 2023-10-05 RX ORDER — MIDAZOLAM HYDROCHLORIDE 1 MG/ML
INJECTION, SOLUTION INTRAMUSCULAR; INTRAVENOUS
Status: DISCONTINUED | OUTPATIENT
Start: 2023-10-05 | End: 2023-10-05

## 2023-10-05 RX ORDER — PROPOFOL 10 MG/ML
VIAL (ML) INTRAVENOUS CONTINUOUS PRN
Status: DISCONTINUED | OUTPATIENT
Start: 2023-10-05 | End: 2023-10-05

## 2023-10-05 RX ORDER — SODIUM CHLORIDE 0.9 % (FLUSH) 0.9 %
10 SYRINGE (ML) INJECTION
Status: DISCONTINUED | OUTPATIENT
Start: 2023-10-05 | End: 2023-10-06 | Stop reason: HOSPADM

## 2023-10-05 RX ORDER — LIDOCAINE HYDROCHLORIDE 20 MG/ML
INJECTION INTRAVENOUS
Status: DISCONTINUED | OUTPATIENT
Start: 2023-10-05 | End: 2023-10-05

## 2023-10-05 RX ORDER — FENTANYL CITRATE 50 UG/ML
25 INJECTION, SOLUTION INTRAMUSCULAR; INTRAVENOUS EVERY 5 MIN PRN
Status: DISCONTINUED | OUTPATIENT
Start: 2023-10-05 | End: 2023-10-06 | Stop reason: HOSPADM

## 2023-10-05 RX ORDER — ETOMIDATE 2 MG/ML
INJECTION INTRAVENOUS
Status: DISCONTINUED | OUTPATIENT
Start: 2023-10-05 | End: 2023-10-05

## 2023-10-05 RX ORDER — ONDANSETRON 2 MG/ML
INJECTION INTRAMUSCULAR; INTRAVENOUS
Status: DISCONTINUED | OUTPATIENT
Start: 2023-10-05 | End: 2023-10-05

## 2023-10-05 RX ORDER — FENTANYL CITRATE 50 UG/ML
INJECTION, SOLUTION INTRAMUSCULAR; INTRAVENOUS
Status: DISCONTINUED | OUTPATIENT
Start: 2023-10-05 | End: 2023-10-05

## 2023-10-05 RX ADMIN — ETOMIDATE 2 MG: 2 INJECTION, SOLUTION INTRAVENOUS at 09:10

## 2023-10-05 RX ADMIN — LIDOCAINE HYDROCHLORIDE 70 MG: 20 INJECTION INTRAVENOUS at 09:10

## 2023-10-05 RX ADMIN — SODIUM CHLORIDE: 0.9 INJECTION, SOLUTION INTRAVENOUS at 09:10

## 2023-10-05 RX ADMIN — FENTANYL CITRATE 25 MCG: 0.05 INJECTION, SOLUTION INTRAMUSCULAR; INTRAVENOUS at 09:10

## 2023-10-05 RX ADMIN — PROPOFOL 25 MG: 10 INJECTION, EMULSION INTRAVENOUS at 09:10

## 2023-10-05 RX ADMIN — MIDAZOLAM HYDROCHLORIDE 2 MG: 1 INJECTION, SOLUTION INTRAMUSCULAR; INTRAVENOUS at 09:10

## 2023-10-05 RX ADMIN — PROPOFOL 100 MCG/KG/MIN: 10 INJECTION, EMULSION INTRAVENOUS at 09:10

## 2023-10-05 RX ADMIN — ETOMIDATE 10 MG: 2 INJECTION, SOLUTION INTRAVENOUS at 09:10

## 2023-10-05 RX ADMIN — ONDANSETRON 4 MG: 2 INJECTION INTRAMUSCULAR; INTRAVENOUS at 09:10

## 2023-10-05 NOTE — TRANSFER OF CARE
"Anesthesia Transfer of Care Note    Patient: Marcello Briggs    Procedure(s) Performed: Procedure(s) (LRB):  CARDIOVERSION (N/A)  Transesophageal echo (JUNIE) intra-procedure log documentation (N/A)    Patient location: PACU    Anesthesia Type: general    Transport from OR: Transported from OR on 6-10 L/min O2 by face mask with adequate spontaneous ventilation    Post pain: adequate analgesia    Post assessment: no apparent anesthetic complications and tolerated procedure well    Post vital signs: stable    Level of consciousness: sedated    Nausea/Vomiting: no nausea/vomiting    Complications: none    Transfer of care protocol was followed      Last vitals:   Visit Vitals  BP (!) 119/59   Pulse 75   Temp 36.6 °C (97.9 °F) (Temporal)   Resp 18   Ht 5' 3" (1.6 m)   Wt 73.5 kg (162 lb)   SpO2 (!) 92%   BMI 28.70 kg/m²     "

## 2023-10-05 NOTE — ANESTHESIA POSTPROCEDURE EVALUATION
Anesthesia Post Evaluation    Patient: Marcello Briggs    Procedure(s) Performed: Procedure(s) (LRB):  CARDIOVERSION (N/A)  Transesophageal echo (JUNIE) intra-procedure log documentation (N/A)    Final Anesthesia Type: general      Patient location during evaluation: PACU  Patient participation: Yes- Able to Participate  Level of consciousness: awake and alert, awake and oriented  Post-procedure vital signs: reviewed and stable  Pain management: adequate  Airway patency: patent    PONV status at discharge: No PONV  Anesthetic complications: no      Cardiovascular status: blood pressure returned to baseline, stable and hemodynamically stable  Respiratory status: unassisted, spontaneous ventilation and room air  Hydration status: euvolemic  Follow-up not needed.          Vitals Value Taken Time   /58 10/05/23 1148   Temp 36.8 °C (98.2 °F) 10/05/23 1148   Pulse 60 10/05/23 1152   Resp 24 10/05/23 1151   SpO2 89 % 10/05/23 1152   Vitals shown include unvalidated device data.      No case tracking events are documented in the log.      Pain/Kaye Score: Presence of Pain: denies (10/5/2023 11:48 AM)  Kaye Score: 9 (10/5/2023 12:20 PM)

## 2023-10-05 NOTE — PLAN OF CARE
Patient recovering from EP procedure. Skin warm with CRT 2-3 seconds.  Plan of care reviewed and questions answered.

## 2023-10-05 NOTE — ANESTHESIA PROCEDURE NOTES
Anesthesia Probe Placement    Diagnosis: Fontan, single ventricle, Atrial Fibrilation, Congenital Heart Disease  Patient location during procedure: OR  Procedure start time: 10/5/2023 9:40 AM  Procedure end time: 10/5/2023 9:42 AM  Surgery related to: Cardioversion    Staffing  Authorizing Provider: Jhoan Saunders MD  Performing Provider: Jhoan Saunders MD    Staffing  Anesthesiologist Present  Yes  Preanesthetic Checklist  Completed: patient identified, risks and benefits discussed, surgical consent, monitors and equipment checked, pre-op evaluation, timeout performed, anesthesia consent given, oxygen available, suction available, hand hygiene performed and patient being monitored  Setup & Induction  Patient preparation: bite block inserted  Probe Insertion: easyStudy to be read by DR. Dutton.  Findings  Impression  Other Findings    Probe Removal     JUNIE probe removed without event.No blood on removal of probe.

## 2023-10-05 NOTE — ANESTHESIA PREPROCEDURE EVALUATION
10/05/2023  Marcello Briggs is a 48 y.o., male with tricuspid atresia, d-TGA, VSD and RV hypoplasia S/P DKS and lateral tunnel Fontan.      2D ECHO 2023 IMPRESSION -  Images consistent with tricuspid atresia, d-TGA, VSD and RV hypoplasia S/P DKS and lateral tunnel Fontan.  Normal intrahepatic inferior vena cava and hepatic veins joining lateral tunnel Fontan and slow moving spontaneous contrast, laminar flow and no obvious thrombus in IVC, hepatic veins, proximal lateral tunnel or SVC.  Unable to demonstrate remaining segments of Fontan circuit and pulmonary arteries with very limited suprasternal windows. Small functional right atrium. Unrestricted bidirectional flow at atrial septum.  Limited images demonstrating no obvious obstruction of pulmonary venous return to the dilated left atrium.  The left atrial volume index is severely enlarged measuring 108 ml/m2.  Large mitral annulus. Mild mitral valve insufficiency. Dilated left ventricle - severe.  Qualitatively low normal left (single) ventricle systolic function with EF estimated 50 -55% from apical views.  Limited images demonstrate unrestricted flow at VSD to anterior aorta and small subaortic chamber (Right ventricle).  Anterior native aortic valve with no stenosis and trivial to mild insufficiency.  Posteriorly positioned, mildly sclerotic, trileaflet pulmonary valve committed to the left ventricle with no left ventricular outflow obstruction, mild insufficiency and no valvar stenosis.  Good imaging of the DKS anastomosis with no evidence of obstruction in either limb.  Technically difficult suprasternal imaging of the aorta demonstrates left aortic arch branching with no evidence of coarctation.    Pre-op Assessment    I have reviewed the Patient Summary Reports.     I have reviewed the Nursing Notes. I have reviewed the NPO Status.   I have reviewed  the Medications.     Review of Systems  Anesthesia Hx:  No problems with previous Anesthesia  Denies Family Hx of Anesthesia complications.   Denies Personal Hx of Anesthesia complications.   Social:  No Alcohol Use, Non-Smoker    Hematology/Oncology:  Hematology Normal   Oncology Normal     EENT/Dental:EENT/Dental Normal   Cardiovascular:   ECG has been reviewed. tricuspid atresia, d-TGA, VSD and RV hypoplasia S/P DKS and lateral tunnel Fontan.   Musculoskeletal:  Musculoskeletal Normal    Neurological:  Neurology Normal    Endocrine:  Endocrine Normal    Dermatological:  Skin Normal    Psych:  Psychiatric Normal           Physical Exam  General: Well nourished, Cooperative, Alert and Oriented    Airway:  Mallampati: I / I  Mouth Opening: Normal  TM Distance: Normal  Tongue: Normal  Neck ROM: Normal ROM    Dental:  Intact    Chest/Lungs:  Clear to auscultation, Normal Respiratory Rate    Heart:  Rate: Normal  Rhythm: Regular Rhythm  Murmur: Systolic;  Systolic: Ejection Type, Grade III;        Anesthesia Plan  Type of Anesthesia, risks & benefits discussed:    Anesthesia Type: Gen Natural Airway  Intra-op Monitoring Plan: Standard ASA Monitors  Post Op Pain Control Plan: multimodal analgesia and IV/PO Opioids PRN  Induction:  IV  Informed Consent: Informed consent signed with the Patient and all parties understand the risks and agree with anesthesia plan.  All questions answered. Patient consented to blood products? No  ASA Score: 4  Day of Surgery Review of History & Physical: H&P Update referred to the surgeon/provider.    Ready For Surgery From Anesthesia Perspective.     .

## 2023-10-05 NOTE — Clinical Note
50mcg of fentanyl given per MASHA Danielle RN  01/14/23 1945 The procedural consent was signed. A history and physical note was completed in the chart.

## 2023-10-05 NOTE — DISCHARGE SUMMARY
[facility] - Cardiology  Discharge Note  Short Stay    Procedure(s) (LRB):  CARDIOVERSION (N/A)  Transesophageal echo (JUNIE) intra-procedure log documentation (N/A)      OUTCOME: Patient tolerated treatment/procedure well without complication and is now ready for discharge.    DISPOSITION: Home or Self Care    FINAL DIAGNOSIS:  Atrial Fibrillation    FOLLOWUP: In clinic    DISCHARGE INSTRUCTIONS:  Return to normal activity      TIME SPENT ON DISCHARGE: 10 minutes

## 2023-10-06 ENCOUNTER — PATIENT MESSAGE (OUTPATIENT)
Dept: PEDIATRIC CARDIOLOGY | Facility: CLINIC | Age: 48
End: 2023-10-06
Payer: COMMERCIAL

## 2023-10-10 ENCOUNTER — PATIENT MESSAGE (OUTPATIENT)
Dept: CARDIOLOGY | Facility: CLINIC | Age: 48
End: 2023-10-10
Payer: COMMERCIAL

## 2023-10-10 DIAGNOSIS — Q22.4 TRICUSPID ATRESIA WITH D-TRANSPOSITION OF GREAT ARTERIES: ICD-10-CM

## 2023-10-10 DIAGNOSIS — Q20.3 TRICUSPID ATRESIA WITH D-TRANSPOSITION OF GREAT ARTERIES: ICD-10-CM

## 2023-10-10 DIAGNOSIS — Z95.0 PACEMAKER: Primary | ICD-10-CM

## 2023-10-10 DIAGNOSIS — Z95.0 CARDIAC PACEMAKER IN SITU: ICD-10-CM

## 2023-10-12 ENCOUNTER — TELEPHONE (OUTPATIENT)
Dept: CARDIOLOGY | Facility: CLINIC | Age: 48
End: 2023-10-12
Payer: COMMERCIAL

## 2023-10-12 NOTE — TELEPHONE ENCOUNTER
Approval for Sildenafil   ----- Message from Martine Jo RN sent at 10/11/2023  3:01 PM CDT -----    ----- Message -----  From: Alka Covarrubias  Sent: 10/11/2023   2:00 PM CDT  To: Dave FLETCHER Staff    [id number]     Patient is calling for Medical Advice regarding:    How long has patient had these symptoms:    Pharmacy name and phone#:    Would like response via [employer]:      Comments:Aysha [phone] with [facility] call about a PA for the pt medication Sildenafil 20mg  has been approved  REF # [id number]

## 2023-10-20 ENCOUNTER — PATIENT MESSAGE (OUTPATIENT)
Dept: PEDIATRIC CARDIOLOGY | Facility: CLINIC | Age: 48
End: 2023-10-20
Payer: COMMERCIAL

## 2023-10-23 ENCOUNTER — HOSPITAL ENCOUNTER (OUTPATIENT)
Dept: PEDIATRIC CARDIOLOGY | Facility: HOSPITAL | Age: 48
Discharge: HOME | End: 2023-10-23
Attending: PEDIATRICS
Payer: COMMERCIAL

## 2023-10-23 DIAGNOSIS — Q24.9 HEART FAILURE DUE TO CONGENITAL HEART DISEASE: ICD-10-CM

## 2023-10-23 DIAGNOSIS — Q20.3 TRICUSPID ATRESIA WITH D-TRANSPOSITION OF GREAT ARTERIES: ICD-10-CM

## 2023-10-23 DIAGNOSIS — Q22.4 TRICUSPID ATRESIA WITH D-TRANSPOSITION OF GREAT ARTERIES: ICD-10-CM

## 2023-10-23 DIAGNOSIS — Q21.0 VSD (VENTRICULAR SEPTAL DEFECT AND AORTIC ARCH HYPOPLASIA: ICD-10-CM

## 2023-10-23 DIAGNOSIS — I50.9 HEART FAILURE DUE TO CONGENITAL HEART DISEASE: ICD-10-CM

## 2023-10-23 DIAGNOSIS — Q25.42 VSD (VENTRICULAR SEPTAL DEFECT AND AORTIC ARCH HYPOPLASIA: ICD-10-CM

## 2023-10-23 DIAGNOSIS — Z95.0 PACEMAKER: ICD-10-CM

## 2023-10-23 DIAGNOSIS — Q24.9 ADULT CONGENITAL HEART DISEASE: ICD-10-CM

## 2023-10-23 DIAGNOSIS — I48.92 ATRIAL FLUTTER, UNSPECIFIED TYPE: ICD-10-CM

## 2023-10-23 PROCEDURE — 93294 REM INTERROG EVL PM/LDLS PM: CPT | Mod: ,,, | Performed by: PEDIATRICS

## 2023-10-23 PROCEDURE — 93294 CV PACEMAKER REMOTE PEDIATRICS (CUPID ONLY): ICD-10-PCS | Mod: ,,, | Performed by: PEDIATRICS

## 2023-10-23 PROCEDURE — 93296 REM INTERROG EVL PM/IDS: CPT

## 2023-10-27 NOTE — ADDENDUM NOTE
Addendum  created 10/27/23 1046 by Jhoan Saunders MD    Clinical Note Signed, Intraprocedure Blocks edited, SmartForm saved

## 2023-10-31 LAB
AV DELAY - LONGEST: 200 MSEC
BATTERY VOLTAGE (V): 2.96 V
IMPEDANCE RA LEAD (NATIVE): 310 OHMS
IMPEDANCE RA LEAD: 290 OHMS
OHS CV DC PP MS1: 1 MS
OHS CV DC PP MS2: 0.5 MS
OHS CV DC PP V1: 3 V
OHS CV DC PP V2: NORMAL V
P/R-WAVE RA LEAD (NATIVE): 4.5 MV
P/R-WAVE RA LEAD: 0.5 MV
PV DELAY - LONGEST: 200 MSEC
THRESHOLD MS RA LEAD: 0.5 MS
THRESHOLD V RA LEAD: 1.25 V

## 2023-11-01 NOTE — H&P (VIEW-ONLY)
Name: Marcello Briggs  MRN: [id number]  : 1975      Subjective:   CC: Single Ventricular / Fontan Physiology    HPI:    Marcello Briggs is a 48 y.o. 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     At his last visit in September, He has a number of high rate episodes. He has CPAP for MIGUEL, but reports that he doesn't use it. He denies any palpitations. He hasn't taken his sotalol in several years. While I had not discontinued it, he reports being unsure if I should have been on the sotalol, but never asked. He also has not taken his metoprolol for at least 1 month (though I suspect longer). At last visit, we discussed scheduling a catheterization, but he hasn't done so yet. He was subsequently found to be in atrial flutter, and was cardioverted on 10/5/2023. His atrial sensitivity was decreased with this, and it appears to have been oversensing some lead noise, so we increased that today.   Since that last visit, he has felt overall somewhat crummy. He has occasional chest discomfort and sensation of irregular heart beat. More notably is he overall feels more fatigued. He denies near-syncope, syncope, fever, cough, congestion, increased swelling.      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  Cardiac/Surgical Hx  History of a classic Roger shunt and a 12 mm conduit from the right atrium to the left pulmonary artery along with a Damus Judy Stansel operation   now with mild native and brigette-aortic insufficiency  lateral tunnel Fontan with a 16 mm ring Goretex graft from the left pulmonary artery to the right pulmonary artery.  elevated central venous pressures with mild desaturation at least partially due to collateral vessels - improved after cath  but still  "an issue.  mild mitral regurgitation   Mildly decreased systemic ventricular function  Related:  Last liver US September 2020  No evidence of protein-losing enteropathy.  Recurrent intra-atrial reentrant tachycardia   arrhythmias typically manifest as "back pain"  Sinus node dysfunction   most recent pacemaker/ICD change 10/08/2020  Nonsustained VT  Noninvasive EP study (NIEPS) negative 2016  Varicose veins   followed in the past by vascular Medicine.    Improved pain and swelling.    Of note, the patient was accessed via the right femoral vein without difficulty at the 2010 catheterization.  Obstructive sleep apnea      Family Hx:  No known family history of congenital heart defects or cardiac surgeries in childhood.  No known family members with pacemakers or defibrillators.  No known inherited channelopathies or cardiomyopathies.  No known hx of sudden cardiac death or heart transplant.  No No known heart attack in someone less than 50yoa.    Social Hx:  Lives in [place], [place].    Review of Systems:  See HPI  ALL: See below.    Medications & Allergy:  Current Outpatient Medications on File Prior to Visit   Medication Sig Dispense Refill    digoxin (LANOXIN) 250 mcg tablet Take 1 tablet (0.25 mg total) by mouth every morning. 30 tablet 3    enalapril (VASOTEC) 10 MG tablet Take 1 tablet (10 mg total) by mouth 2 (two) times daily. 60 tablet 3    furosemide (LASIX) 20 MG tablet Take 1 tablet (20 mg total) by mouth once daily. 30 tablet 11    metoprolol succinate (TOPROL-XL) 100 MG 24 hr tablet Take 3 tablets (300 mg total) by mouth once daily. 270 tablet 3    sildenafil (REVATIO) 20 mg Tab Take 1 tablet (20 mg total) by mouth 2 (two) times daily. 60 tablet 3    warfarin (COUMADIN) 2 MG tablet Take 10 mg (6mg + 2 (2mg) tabs every Saturday and 8mg (6mg +2 mg) all other days as directed 35 tablet 3    warfarin (COUMADIN) 6 MG tablet TAKE 10MG (6MG + 2 (2MG) TABS EVERY SATURDAY AND 8MG (6MG + 2MG) TABS ALL OTHER DAYS " "AS DIRECTED 30 tablet 3    sotaloL (BETAPACE) 240 MG Tab TAKE 1 TABLET (240 MG TOTAL) BY MOUTH 2 (TWO) TIMES DAILY. (Patient not taking: Reported on 11/2/2023) 180 tablet 3     No current facility-administered medications on file prior to visit.       Review of patient's allergies indicates:  No Known Allergies       Objective:   Vitals:  Vitals:    11/02/23 0933   BP: 130/60   BP Location: Left arm   Patient Position: Sitting   Pulse: 62   SpO2: (!) 87%   Weight: 72.1 kg (158 lb 15.2 oz)   Height: 5' 2.99" (1.6 m)         Body mass index is 28.16 kg/m².  Body surface area is 1.79 meters squared.    Exam:  GEN: No acute distress, Normal appearing  EYE: Anicteric sclerae  ENT: No drainage, Moist mucous membranes  PULM: Normal work of breathing;  Clear to auscultation bilaterally, Good air movement throughout.  CV: No chest pain;   Single S2,   I/VI systolic murmur;   No rubs or gallops;  EXT: No cyanosis, Mild RLE edema. Significant varices noted (unchanged)   2+ radial and dorsalis pedis pulses bilaterally  ABD: Soft, Non-distended, Non-tender,    Liver edge 3cm below RCM;  Normal bowel sounds  DERM: No rashes  NEUR: Normal gait, Grossly normal tone.  PSY: Normal mood and affect      Results / Data:   ECG:   (11/02/2023) - Atrial paced rhythm, intraventricular conduction delay.  (09/07/2023) - AV sequentially paced rhythm and competing junctional rhythm.  (08/18/2022) - Atrial paced rhythm, intraventricular conduction delay.  (10/22/2020) - Atrial paced rhythm, intraventricular conduction delay.    Holter/Zio:   (08/18/2022)  Pending, placed today.    (09/18/2020)  Predominant rhythm is AV sequential pacing  Rare atrial/ventricular ectopy  20 episodes of slow SVT (longest 9 beats fastest 133 bpm)  One 4 beat episode of slow VT (max rate 156 bpm) and one ventricular triplet  No diary symptoms    (9/18/20)  Predominant rhythm is AV sequential pacing  Rare atrial/ventricular ectopy  20 episodes of slow SVT (longest 9 " beats fastest 133 bpm)  One 4 beat episode of slow VT (max rate 156 bpm) and one ventricular triplet  No diary symptoms    Echocardiogram:   (09/07/2023)   CONGENITAL CARDIAC HISTORY:  Tricuspid atresia, hypoplastic right ventricle with ventricular septal defect and transposition of the great arteries.  INTERVENTION:  S/P [id number] and lateral tunnel Fontan.  S/P Coil and Amplatzer occlusion of venovenous collaterals (detailed description of anatomy in cath report July 2010).     SEGMENTAL CARDIAC CONNECTIONS (previously demonstrated):  Abdominal situs is solitus.  Atrial situs is normal.  Tricuspid atresia.  Abnormal mitral valve with tricuspid atresia.  Very dilated, globular left ventricle with very hypoplastic right ventricle.  D-transposed great vessels.  Aortic valve is normal and pulmonic valve is normal. D-loop.  Cardiac position is Levocardia.        IMPRESSION -  Technically difficult imaging -  Images consistent with tricuspid atresia, d-TGA, VSD and RV hypoplasia S/P DKS and lateral tunnel Fontan.  Normal intrahepatic inferior vena cava and hepatic veins joining lateral tunnel Fontan and slow moving spontaneous contrast, laminar flow and no obvious thrombus in IVC, hepatic veins, proximal lateral tunnel or SVC.  Unable to demonstrate remaining segments of Fontan circuit and pulmonary arteries with very limited suprasternal windows.  Small functional right atrium.  Unrestricted bidirectional flow at atrial septum.  Limited images demonstrating no obvious obstruction of pulmonary venous return to the dilated left atrium.  The left atrial volume index is severely enlarged measuring 108 ml/m2.  Large mitral annulus.  Mild mitral valve insufficiency.  Dilated left ventricle - severe.  Qualitatively low normal left (single) ventricle systolic function with EF estimated 50 -55% from apical views.  Limited images demonstrate unrestricted flow at VSD to anterior aorta and small subaortic chamber (Right  ventricle).  Anterior native aortic valve with no stenosis and trivial to mild insufficiency.  Posteriorly positioned, mildly sclerotic, trileaflet pulmonary valve committed to the left ventricle with no left ventricular outflow obstruction, mild insufficiency and no valvar stenosis.  Good imaging of the DKS anastomosis with no evidence of obstruction in either limb.  Technically difficult suprasternal imaging of the aorta demonstrates left aortic arch branching with no evidence of coarctation.  No pericardial effusion.    (08/18/2022)  CONGENITAL CARDIAC HISTORY:  Tricuspid atresia, hypoplastic right ventricle with ventricular septal defect and transposition of the great arteries.  INTERVENTION:  S/P [id number] and lateral tunnel Fontan.  S/P Coil and Amplatzer occlusion of venovenous collaterals (detailed description of anatomy in cath report July 2010).     SEGMENTAL CARDIAC CONNECTIONS:  Abdominal situs is solitus.   Atrial situs is normal.   Tricuspid atresia.   Abnormal mitral valve with tricuspid atresia.   Very dilated, globular left ventricle with very hypoplastic right ventricle.   D-transposed great vessels.   Aortic valve is normal and pulmonic valve is normal. D-loop.   Cardiac position is Levocardia.     IMPRESSION  Technically difficult imaging -  Images consistent with tricuspid atresia, d-TGA, VSD and RV hypoplasia S/P DKS and lateral tunnel Fontan.  Qualitative impression of improved LV function compared to previous study with no other significant changes noted.  Normal intrahepatic inferior vena cava and hepatic veins joining lateral tunnel Fontan and slow moving spontaneous contrast, laminar flow and no obvious thrombus in IVC, hepatic veins, proximal lateral tunnel or SVC.  Unable to demonstrate remaining segments of Fontan circuit and pulmonary arteries with very limited suprasternal windows.  Small functional right atrium.  Unrestricted bidirectional flow at atrial septum.  The left  atrial volume index is severely enlarged measuring 101 ml/m2.   Large mitral annulus.  At least mild mitral valve insufficiency.  Dilated left ventricle - severe.  Qualitatively lborderline left (single) ventricle systolic function with EF estimated 45 -50% from apical views.  Limited images suggest unrestricted flow at VSD to anterior aorta and small subaortic chamber (Right ventricle).  Anterior native aortic valve with no stenosis and trivial to mild insufficiency.  Posteriorly positioned trileaflet pulmonary valve committed to the left ventricle with no left ventricular outflow obstruction, trivial to mild insufficiency and no valvar stenosis.  Good imaging of the DKS anastomosis with no evidence of obstruction in either limb.  No pericardial effusion.      Pacemaker Interrogation:  (09/07/2023)    Mode: DDDR Lower limit rate: 75 bpm Upper tracking rate: 130 bpm    Reprogramming comments: No changes this session    General comments: Device interrogation and lead testing performed. Device and leads WNL. 59 HVR's    (06/14/2023)  REMOTE transmission received and data reviewed. Device and leads WNL. Battery longevity 2.0-2.6 yrs Atrial paced 40 % Ventricular paced 42 % 2 AMS- 1 EGM- Noise artifact noted 16 VHRs- Idioventricular rhythm with intermittent V-pacing    (08/18/2022, in clinic)  Permanent Programming   RA Lead: 2.0 V @ 1 ms. Sensitivity: 2 mV.   RV Lead: 1.375 Auto V @ 0.5 ms. Sensitivity: 2 mV.   Pacemaker Generator and Leads meet standard of FDA approval.   Chamber type: dual.   Mode: DDDR   Lower limit rate: 75 bpm   Upper tracking rate: 130 bpm   AV Delay Fixed: 200 msec   PV Delay Fixed: 200 msec  Battery voltage: 2.99 V  Estimated longevity: 3.9 - 4.8 years .   Magnet Rate: 100 ppm  Leads  RA Lead:        P/R-wave: (none)Paced mV       Lead Impedance: 340 Ohms       Paced: 82%   RV Lead:        Impedance: 350 Ohms       Paced: 78%   Thresholds  RA Lead: 1 V @ 1 ms. Configuration: unipolar.   RV  Lead: 1.25 V @ 0.5 ms. Configuration: bipolar.  Reprogramming comments:  No changes this session   General comments:   Device interrogation and lead testing performed. Device and leads WNL.  HVR x 16. Longest 10 minutes 24 seconds;  Available EGM's show VS episodes; Minimal data on atrial EGM - occasional AP or noise artifact noted.       Catheterization: (July 7, 2010)   1. Complex univentricular cardiac defect with superior inferior ventricles with double inlet left ventricle (atrioventricular connection not defined at catheterization), status post [id number] connection and intracardiac lateral tunnel Fontan.   2. Moderate neoaortic (pulmonary) valve insufficiency.   3. Left aorta.   4. 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   5. Elevated central venous pressure (mean 19), transpulmonary gradient 4-5 mmHg.    Assessment / Plan:   Marcello Briggs is a 48 y.o. 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    He was noted to be in atrial fibrillation or IART in late September and underwent cardioversion on 10/5/2023.    He has had a number of high rate episodes this past interrogation. We have seen this before, and considered this possibly noise vs arrhythmia. Because this affects his atrial pacing, we returned atrial sensitivity back to 2.0mV    He has overall felt worse with greater fatigue. He was not taking the correct sildenafil dose due to insurance and pharmacy issue that we are now helping him work on. I agree with Dr. Acosta with catheterization for hemodynamic assessment. At that time, I would like to restart his Sotalol, so would coordinate that cath with an admission to load Sotalol.    At his last visit with me in 9/2023, it was noted by he that he was off metoprolol (at least a month) and Sotalol  (likely a couple years). Also extremely important is that he is not using his CPAP for MIGUEL, particularly as many episodes are in the early morning. Two immediate interventions are resuming the metoprolol and resuming CPAP. We could consider resuming Sotalol if these concerns persist, but that has been long enough off of the medicine to necessitate admission to load the medicine, so for now we will leave this medicine off.        Follow-up:   1 and 3 months remote transmission  Agree with Dr. Acosta that catheterization would be useful  At minimum 6 months clinic interrogation with ECG, echocardiogram, and 24hr heart rhythm monitor  Cardiac medications:    Digoxin  Enalapril  Lasix  Metoprolol XL (Not taking, restarted todasy)  Sotalol (Not taking)  Warfarin    Please contact us if he has any questions or concerns.  Our clinic from his [phone] during office hours. For urgent night and weekend concerns, call [phone] and ask for the pediatric cardiologist on call to be paged.

## 2023-11-01 NOTE — PROGRESS NOTES
Name: Marcello Briggs  MRN: [id number]  : 1975      Subjective:   CC: Single Ventricular / Fontan Physiology    HPI:    Marcello Briggs is a 48 y.o. 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     At his last visit in September, He has a number of high rate episodes. He has CPAP for MIGUEL, but reports that he doesn't use it. He denies any palpitations. He hasn't taken his sotalol in several years. While I had not discontinued it, he reports being unsure if I should have been on the sotalol, but never asked. He also has not taken his metoprolol for at least 1 month (though I suspect longer). At last visit, we discussed scheduling a catheterization, but he hasn't done so yet. He was subsequently found to be in atrial flutter, and was cardioverted on 10/5/2023. His atrial sensitivity was decreased with this, and it appears to have been oversensing some lead noise, so we increased that today.   Since that last visit, he has felt overall somewhat crummy. He has occasional chest discomfort and sensation of irregular heart beat. More notably is he overall feels more fatigued. He denies near-syncope, syncope, fever, cough, congestion, increased swelling.      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  Cardiac/Surgical Hx  History of a classic Roger shunt and a 12 mm conduit from the right atrium to the left pulmonary artery along with a Damus Judy Stansel operation   now with mild native and brigette-aortic insufficiency  lateral tunnel Fontan with a 16 mm ring Goretex graft from the left pulmonary artery to the right pulmonary artery.  elevated central venous pressures with mild desaturation at least partially due to collateral vessels - improved after cath  but still  "an issue.  mild mitral regurgitation   Mildly decreased systemic ventricular function  Related:  Last liver US September 2020  No evidence of protein-losing enteropathy.  Recurrent intra-atrial reentrant tachycardia   arrhythmias typically manifest as "back pain"  Sinus node dysfunction   most recent pacemaker/ICD change 10/08/2020  Nonsustained VT  Noninvasive EP study (NIEPS) negative 2016  Varicose veins   followed in the past by vascular Medicine.    Improved pain and swelling.    Of note, the patient was accessed via the right femoral vein without difficulty at the 2010 catheterization.  Obstructive sleep apnea      Family Hx:  No known family history of congenital heart defects or cardiac surgeries in childhood.  No known family members with pacemakers or defibrillators.  No known inherited channelopathies or cardiomyopathies.  No known hx of sudden cardiac death or heart transplant.  No No known heart attack in someone less than 50yoa.    Social Hx:  Lives in [place], [place].    Review of Systems:  See HPI  ALL: See below.    Medications & Allergy:  Current Outpatient Medications on File Prior to Visit   Medication Sig Dispense Refill    digoxin (LANOXIN) 250 mcg tablet Take 1 tablet (0.25 mg total) by mouth every morning. 30 tablet 3    enalapril (VASOTEC) 10 MG tablet Take 1 tablet (10 mg total) by mouth 2 (two) times daily. 60 tablet 3    furosemide (LASIX) 20 MG tablet Take 1 tablet (20 mg total) by mouth once daily. 30 tablet 11    metoprolol succinate (TOPROL-XL) 100 MG 24 hr tablet Take 3 tablets (300 mg total) by mouth once daily. 270 tablet 3    sildenafil (REVATIO) 20 mg Tab Take 1 tablet (20 mg total) by mouth 2 (two) times daily. 60 tablet 3    warfarin (COUMADIN) 2 MG tablet Take 10 mg (6mg + 2 (2mg) tabs every Saturday and 8mg (6mg +2 mg) all other days as directed 35 tablet 3    warfarin (COUMADIN) 6 MG tablet TAKE 10MG (6MG + 2 (2MG) TABS EVERY SATURDAY AND 8MG (6MG + 2MG) TABS ALL OTHER DAYS " "AS DIRECTED 30 tablet 3    sotaloL (BETAPACE) 240 MG Tab TAKE 1 TABLET (240 MG TOTAL) BY MOUTH 2 (TWO) TIMES DAILY. (Patient not taking: Reported on 11/2/2023) 180 tablet 3     No current facility-administered medications on file prior to visit.       Review of patient's allergies indicates:  No Known Allergies       Objective:   Vitals:  Vitals:    11/02/23 0933   BP: 130/60   BP Location: Left arm   Patient Position: Sitting   Pulse: 62   SpO2: (!) 87%   Weight: 72.1 kg (158 lb 15.2 oz)   Height: 5' 2.99" (1.6 m)         Body mass index is 28.16 kg/m².  Body surface area is 1.79 meters squared.    Exam:  GEN: No acute distress, Normal appearing  EYE: Anicteric sclerae  ENT: No drainage, Moist mucous membranes  PULM: Normal work of breathing;  Clear to auscultation bilaterally, Good air movement throughout.  CV: No chest pain;   Single S2,   I/VI systolic murmur;   No rubs or gallops;  EXT: No cyanosis, Mild RLE edema. Significant varices noted (unchanged)   2+ radial and dorsalis pedis pulses bilaterally  ABD: Soft, Non-distended, Non-tender,    Liver edge 3cm below RCM;  Normal bowel sounds  DERM: No rashes  NEUR: Normal gait, Grossly normal tone.  PSY: Normal mood and affect      Results / Data:   ECG:   (11/02/2023) - Atrial paced rhythm, intraventricular conduction delay.  (09/07/2023) - AV sequentially paced rhythm and competing junctional rhythm.  (08/18/2022) - Atrial paced rhythm, intraventricular conduction delay.  (10/22/2020) - Atrial paced rhythm, intraventricular conduction delay.    Holter/Zio:   (08/18/2022)  Pending, placed today.    (09/18/2020)  Predominant rhythm is AV sequential pacing  Rare atrial/ventricular ectopy  20 episodes of slow SVT (longest 9 beats fastest 133 bpm)  One 4 beat episode of slow VT (max rate 156 bpm) and one ventricular triplet  No diary symptoms    (9/18/20)  Predominant rhythm is AV sequential pacing  Rare atrial/ventricular ectopy  20 episodes of slow SVT (longest 9 " beats fastest 133 bpm)  One 4 beat episode of slow VT (max rate 156 bpm) and one ventricular triplet  No diary symptoms    Echocardiogram:   (09/07/2023)   CONGENITAL CARDIAC HISTORY:  Tricuspid atresia, hypoplastic right ventricle with ventricular septal defect and transposition of the great arteries.  INTERVENTION:  S/P Xodla-Mssf-Fwhzamp and lateral tunnel Fontan.  S/P Coil and Amplatzer occlusion of venovenous collaterals (detailed description of anatomy in cath report July 2010).     SEGMENTAL CARDIAC CONNECTIONS (previously demonstrated):  Abdominal situs is solitus.  Atrial situs is normal.  Tricuspid atresia.  Abnormal mitral valve with tricuspid atresia.  Very dilated, globular left ventricle with very hypoplastic right ventricle.  D-transposed great vessels.  Aortic valve is normal and pulmonic valve is normal. D-loop.  Cardiac position is Levocardia.        IMPRESSION -  Technically difficult imaging -  Images consistent with tricuspid atresia, d-TGA, VSD and RV hypoplasia S/P DKS and lateral tunnel Fontan.  Normal intrahepatic inferior vena cava and hepatic veins joining lateral tunnel Fontan and slow moving spontaneous contrast, laminar flow and no obvious thrombus in IVC, hepatic veins, proximal lateral tunnel or SVC.  Unable to demonstrate remaining segments of Fontan circuit and pulmonary arteries with very limited suprasternal windows.  Small functional right atrium.  Unrestricted bidirectional flow at atrial septum.  Limited images demonstrating no obvious obstruction of pulmonary venous return to the dilated left atrium.  The left atrial volume index is severely enlarged measuring 108 ml/m2.  Large mitral annulus.  Mild mitral valve insufficiency.  Dilated left ventricle - severe.  Qualitatively low normal left (single) ventricle systolic function with EF estimated 50 -55% from apical views.  Limited images demonstrate unrestricted flow at VSD to anterior aorta and small subaortic chamber (Right  ventricle).  Anterior native aortic valve with no stenosis and trivial to mild insufficiency.  Posteriorly positioned, mildly sclerotic, trileaflet pulmonary valve committed to the left ventricle with no left ventricular outflow obstruction, mild insufficiency and no valvar stenosis.  Good imaging of the DKS anastomosis with no evidence of obstruction in either limb.  Technically difficult suprasternal imaging of the aorta demonstrates left aortic arch branching with no evidence of coarctation.  No pericardial effusion.    (08/18/2022)  CONGENITAL CARDIAC HISTORY:  Tricuspid atresia, hypoplastic right ventricle with ventricular septal defect and transposition of the great arteries.  INTERVENTION:  S/P Sdovc-Lced-Dsppwbc and lateral tunnel Fontan.  S/P Coil and Amplatzer occlusion of venovenous collaterals (detailed description of anatomy in cath report July 2010).     SEGMENTAL CARDIAC CONNECTIONS:  Abdominal situs is solitus.   Atrial situs is normal.   Tricuspid atresia.   Abnormal mitral valve with tricuspid atresia.   Very dilated, globular left ventricle with very hypoplastic right ventricle.   D-transposed great vessels.   Aortic valve is normal and pulmonic valve is normal. D-loop.   Cardiac position is Levocardia.     IMPRESSION  Technically difficult imaging -  Images consistent with tricuspid atresia, d-TGA, VSD and RV hypoplasia S/P DKS and lateral tunnel Fontan.  Qualitative impression of improved LV function compared to previous study with no other significant changes noted.  Normal intrahepatic inferior vena cava and hepatic veins joining lateral tunnel Fontan and slow moving spontaneous contrast, laminar flow and no obvious thrombus in IVC, hepatic veins, proximal lateral tunnel or SVC.  Unable to demonstrate remaining segments of Fontan circuit and pulmonary arteries with very limited suprasternal windows.  Small functional right atrium.  Unrestricted bidirectional flow at atrial septum.  The left  atrial volume index is severely enlarged measuring 101 ml/m2.   Large mitral annulus.  At least mild mitral valve insufficiency.  Dilated left ventricle - severe.  Qualitatively lborderline left (single) ventricle systolic function with EF estimated 45 -50% from apical views.  Limited images suggest unrestricted flow at VSD to anterior aorta and small subaortic chamber (Right ventricle).  Anterior native aortic valve with no stenosis and trivial to mild insufficiency.  Posteriorly positioned trileaflet pulmonary valve committed to the left ventricle with no left ventricular outflow obstruction, trivial to mild insufficiency and no valvar stenosis.  Good imaging of the DKS anastomosis with no evidence of obstruction in either limb.  No pericardial effusion.      Pacemaker Interrogation:  (09/07/2023)    Mode: DDDR Lower limit rate: 75 bpm Upper tracking rate: 130 bpm    Reprogramming comments: No changes this session    General comments: Device interrogation and lead testing performed. Device and leads WNL. 59 HVR's    (06/14/2023)  REMOTE transmission received and data reviewed. Device and leads WNL. Battery longevity 2.0-2.6 yrs Atrial paced 40 % Ventricular paced 42 % 2 AMS- 1 EGM- Noise artifact noted 16 VHRs- Idioventricular rhythm with intermittent V-pacing    (08/18/2022, in clinic)  Permanent Programming   RA Lead: 2.0 V @ 1 ms. Sensitivity: 2 mV.   RV Lead: 1.375 Auto V @ 0.5 ms. Sensitivity: 2 mV.   Pacemaker Generator and Leads meet standard of FDA approval.   Chamber type: dual.   Mode: DDDR   Lower limit rate: 75 bpm   Upper tracking rate: 130 bpm   AV Delay Fixed: 200 msec   PV Delay Fixed: 200 msec  Battery voltage: 2.99 V  Estimated longevity: 3.9 - 4.8 years .   Magnet Rate: 100 ppm  Leads  RA Lead:        P/R-wave: (none)Paced mV       Lead Impedance: 340 Ohms       Paced: 82%   RV Lead:        Impedance: 350 Ohms       Paced: 78%   Thresholds  RA Lead: 1 V @ 1 ms. Configuration: unipolar.   RV  Lead: 1.25 V @ 0.5 ms. Configuration: bipolar.  Reprogramming comments:  No changes this session   General comments:   Device interrogation and lead testing performed. Device and leads WNL.  HVR x 16. Longest 10 minutes 24 seconds;  Available EGM's show VS episodes; Minimal data on atrial EGM - occasional AP or noise artifact noted.       Catheterization: (July 7, 2010)   1. Complex univentricular cardiac defect with superior inferior ventricles with double inlet left ventricle (atrioventricular connection not defined at catheterization), status post Pfajt-Acdo-Tvqengm connection and intracardiac lateral tunnel Fontan.   2. Moderate neoaortic (pulmonary) valve insufficiency.   3. Left aorta.   4. 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   5. Elevated central venous pressure (mean 19), transpulmonary gradient 4-5 mmHg.    Assessment / Plan:   Marcello Briggs is a 48 y.o. 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    He was noted to be in atrial fibrillation or IART in late September and underwent cardioversion on 10/5/2023.    He has had a number of high rate episodes this past interrogation. We have seen this before, and considered this possibly noise vs arrhythmia. Because this affects his atrial pacing, we returned atrial sensitivity back to 2.0mV    He has overall felt worse with greater fatigue. He was not taking the correct sildenafil dose due to insurance and pharmacy issue that we are now helping him work on. I agree with Dr. Acosta with catheterization for hemodynamic assessment. At that time, I would like to restart his Sotalol, so would coordinate that cath with an admission to load Sotalol.    At his last visit with me in 9/2023, it was noted by he that he was off metoprolol (at least a month) and Sotalol  (likely a couple years). Also extremely important is that he is not using his CPAP for MIGUEL, particularly as many episodes are in the early morning. Two immediate interventions are resuming the metoprolol and resuming CPAP. We could consider resuming Sotalol if these concerns persist, but that has been long enough off of the medicine to necessitate admission to load the medicine, so for now we will leave this medicine off.        Follow-up:   1 and 3 months remote transmission  Agree with Dr. Acosta that catheterization would be useful  At minimum 6 months clinic interrogation with ECG, echocardiogram, and 24hr heart rhythm monitor  Cardiac medications:    Digoxin  Enalapril  Lasix  Metoprolol XL (Not taking, restarted todasy)  Sotalol (Not taking)  Warfarin    Please contact us if he has any questions or concerns.  Our clinic from his [phone] during office hours. For urgent night and weekend concerns, call [phone] and ask for the pediatric cardiologist on call to be paged.

## 2023-11-02 ENCOUNTER — OFFICE VISIT (OUTPATIENT)
Dept: CARDIOLOGY | Facility: CLINIC | Age: 48
End: 2023-11-02
Payer: COMMERCIAL

## 2023-11-02 ENCOUNTER — HOSPITAL ENCOUNTER (OUTPATIENT)
Dept: CARDIOLOGY | Facility: CLINIC | Age: 48
Discharge: HOME | End: 2023-11-02
Payer: COMMERCIAL

## 2023-11-02 VITALS
BODY MASS INDEX: 28.16 KG/M2 | HEART RATE: 62 BPM | HEIGHT: 63 IN | DIASTOLIC BLOOD PRESSURE: 60 MMHG | OXYGEN SATURATION: 87 % | WEIGHT: 158.94 LBS | SYSTOLIC BLOOD PRESSURE: 130 MMHG

## 2023-11-02 DIAGNOSIS — Z98.890 S/P FONTAN PROCEDURE: ICD-10-CM

## 2023-11-02 DIAGNOSIS — Q20.3 TRICUSPID ATRESIA WITH D-TRANSPOSITION OF GREAT ARTERIES: ICD-10-CM

## 2023-11-02 DIAGNOSIS — I48.4 ATYPICAL ATRIAL FLUTTER: Primary | ICD-10-CM

## 2023-11-02 DIAGNOSIS — Q22.4 TRICUSPID ATRESIA WITH D-TRANSPOSITION OF GREAT ARTERIES: ICD-10-CM

## 2023-11-02 DIAGNOSIS — Q24.9 ADULT CONGENITAL HEART DISEASE: ICD-10-CM

## 2023-11-02 DIAGNOSIS — Z95.0 CARDIAC PACEMAKER IN SITU: ICD-10-CM

## 2023-11-02 DIAGNOSIS — Z95.0 PACEMAKER: ICD-10-CM

## 2023-11-02 PROCEDURE — 99214 PR OFFICE/OUTPT VISIT, EST, LEVL IV, 30-39 MIN: ICD-10-PCS | Mod: S$GLB,,, | Performed by: PEDIATRICS

## 2023-11-02 PROCEDURE — 3078F PR MOST RECENT DIASTOLIC BLOOD PRESSURE < 80 MM HG: ICD-10-PCS | Mod: CPTII,S$GLB,, | Performed by: PEDIATRICS

## 2023-11-02 PROCEDURE — 93005 EKG 12-LEAD: ICD-10-PCS | Mod: S$GLB,,, | Performed by: PEDIATRICS

## 2023-11-02 PROCEDURE — 4010F PR ACE/ARB THEARPY RXD/TAKEN: ICD-10-PCS | Mod: CPTII,S$GLB,, | Performed by: PEDIATRICS

## 2023-11-02 PROCEDURE — 3078F DIAST BP <80 MM HG: CPT | Mod: CPTII,S$GLB,, | Performed by: PEDIATRICS

## 2023-11-02 PROCEDURE — 3008F BODY MASS INDEX DOCD: CPT | Mod: CPTII,S$GLB,, | Performed by: PEDIATRICS

## 2023-11-02 PROCEDURE — 99999 PR PBB SHADOW E&M-EST. PATIENT-LVL II: ICD-10-PCS | Mod: PBBFAC,,, | Performed by: PEDIATRICS

## 2023-11-02 PROCEDURE — 99999 PR PBB SHADOW E&M-EST. PATIENT-LVL II: CPT | Mod: PBBFAC,,, | Performed by: PEDIATRICS

## 2023-11-02 PROCEDURE — 3075F SYST BP GE 130 - 139MM HG: CPT | Mod: CPTII,S$GLB,, | Performed by: PEDIATRICS

## 2023-11-02 PROCEDURE — 99214 OFFICE O/P EST MOD 30 MIN: CPT | Mod: S$GLB,,, | Performed by: PEDIATRICS

## 2023-11-02 PROCEDURE — 93010 EKG 12-LEAD: ICD-10-PCS | Mod: S$GLB,,, | Performed by: INTERNAL MEDICINE

## 2023-11-02 PROCEDURE — 93010 ELECTROCARDIOGRAM REPORT: CPT | Mod: S$GLB,,, | Performed by: INTERNAL MEDICINE

## 2023-11-02 PROCEDURE — 93005 ELECTROCARDIOGRAM TRACING: CPT | Mod: S$GLB,,, | Performed by: PEDIATRICS

## 2023-11-02 PROCEDURE — 4010F ACE/ARB THERAPY RXD/TAKEN: CPT | Mod: CPTII,S$GLB,, | Performed by: PEDIATRICS

## 2023-11-02 PROCEDURE — 3075F PR MOST RECENT SYSTOLIC BLOOD PRESS GE 130-139MM HG: ICD-10-PCS | Mod: CPTII,S$GLB,, | Performed by: PEDIATRICS

## 2023-11-02 PROCEDURE — 3008F PR BODY MASS INDEX (BMI) DOCUMENTED: ICD-10-PCS | Mod: CPTII,S$GLB,, | Performed by: PEDIATRICS

## 2023-11-10 ENCOUNTER — TELEPHONE (OUTPATIENT)
Dept: PEDIATRIC CARDIOLOGY | Facility: CLINIC | Age: 48
End: 2023-11-10
Payer: COMMERCIAL

## 2023-11-10 ENCOUNTER — PATIENT MESSAGE (OUTPATIENT)
Dept: PEDIATRIC CARDIOLOGY | Facility: CLINIC | Age: 48
End: 2023-11-10
Payer: COMMERCIAL

## 2023-11-10 NOTE — TELEPHONE ENCOUNTER
Cath scheduled for November 28 with planned admission for 3 days after. Informed to continue coumadin. Instruction letter sent via [facility] app and in the mail. VU and agreed. Dr. Acosta anf Dr. Boykin updated at this time.     ----- Message from Muriel Pro MD sent at 11/9/2023  7:57 AM CST -----  Regarding: RE: cath and admission  We can do cath on Coumadin if they don't want to hold.    IC3  ----- Message -----  From: Dereje Acosta MD  Sent: 11/8/2023   6:41 PM CST  To: Raza Lester Jr., MD; #  Subject: RE: cath and admission                           I am find holding it for 5 days before cath if Shlomo is, but recent arrhythmia may warrant continued therapy.  I'm planning to switch him to a DOAC after cath.      Shlomo - OK holding coumadin?  If not..cath guys - OK doing on coumadin?  If not, we can bridge.    ----- Message -----  From: Melissa Aguilar RN  Sent: 11/8/2023  10:01 AM CST  To: Raza Lester Jr., MD; Dereje Acosta MD; #  Subject: RE: cath and admission                           What's his Coumadin plan?   ----- Message -----  From: Dereje Acosta MD  Sent: 11/2/2023   6:29 PM CST  To: Melissa Aguilar RN; Angela Duran RN; #  Subject: cath and admission                               Can we get him scheduled for cath (my plan, was scheduled for last month but he needed cardioversion out of flutter) followed by inpatient admission for 3 days for change in his antiarrhythmics (Shlomo's plan)           denies pain/discomfort

## 2023-11-20 ENCOUNTER — PATIENT MESSAGE (OUTPATIENT)
Dept: PEDIATRIC CARDIOLOGY | Facility: CLINIC | Age: 48
End: 2023-11-20
Payer: COMMERCIAL

## 2023-11-28 ENCOUNTER — HOSPITAL ENCOUNTER (INPATIENT)
Facility: HOSPITAL | Age: 48
LOS: 3 days | Discharge: HOME | DRG: 287 | End: 2023-12-01
Attending: PEDIATRICS | Admitting: PEDIATRICS
Payer: COMMERCIAL

## 2023-11-28 ENCOUNTER — ANESTHESIA (OUTPATIENT)
Dept: MEDSURG UNIT | Facility: HOSPITAL | Age: 48
DRG: 287 | End: 2023-11-28
Payer: COMMERCIAL

## 2023-11-28 ENCOUNTER — ANESTHESIA EVENT (OUTPATIENT)
Dept: MEDSURG UNIT | Facility: HOSPITAL | Age: 48
DRG: 287 | End: 2023-11-28
Payer: COMMERCIAL

## 2023-11-28 DIAGNOSIS — Q21.0 VSD (VENTRICULAR SEPTAL DEFECT AND AORTIC ARCH HYPOPLASIA: ICD-10-CM

## 2023-11-28 DIAGNOSIS — I49.9 ARRHYTHMIA: ICD-10-CM

## 2023-11-28 DIAGNOSIS — I50.9 HEART FAILURE DUE TO CONGENITAL HEART DISEASE: ICD-10-CM

## 2023-11-28 DIAGNOSIS — Z95.0 CARDIAC PACEMAKER IN SITU: ICD-10-CM

## 2023-11-28 DIAGNOSIS — Q20.3 TRICUSPID ATRESIA WITH D-TRANSPOSITION OF GREAT ARTERIES: ICD-10-CM

## 2023-11-28 DIAGNOSIS — Z51.81 ENCOUNTER FOR MONITORING SOTALOL THERAPY: ICD-10-CM

## 2023-11-28 DIAGNOSIS — Q24.9 ADULT CONGENITAL HEART DISEASE: ICD-10-CM

## 2023-11-28 DIAGNOSIS — Z98.890 S/P FONTAN PROCEDURE: ICD-10-CM

## 2023-11-28 DIAGNOSIS — Z79.899 ENCOUNTER FOR MONITORING SOTALOL THERAPY: ICD-10-CM

## 2023-11-28 DIAGNOSIS — I48.92 ATRIAL FLUTTER: ICD-10-CM

## 2023-11-28 DIAGNOSIS — Q25.42 VSD (VENTRICULAR SEPTAL DEFECT AND AORTIC ARCH HYPOPLASIA: ICD-10-CM

## 2023-11-28 DIAGNOSIS — Q22.4 TRICUSPID ATRESIA WITH D-TRANSPOSITION OF GREAT ARTERIES: ICD-10-CM

## 2023-11-28 DIAGNOSIS — I49.5 SINUS NODE DYSFUNCTION: ICD-10-CM

## 2023-11-28 DIAGNOSIS — I49.9 VENTRICULAR ARRHYTHMIA: ICD-10-CM

## 2023-11-28 DIAGNOSIS — I48.91 A-FIB: ICD-10-CM

## 2023-11-28 DIAGNOSIS — I48.4 ATYPICAL ATRIAL FLUTTER: Primary | ICD-10-CM

## 2023-11-28 DIAGNOSIS — Q24.9 HEART FAILURE DUE TO CONGENITAL HEART DISEASE: ICD-10-CM

## 2023-11-28 DIAGNOSIS — Z01.89 ENCOUNTER FOR CARDIOVERSION PROCEDURE: ICD-10-CM

## 2023-11-28 DIAGNOSIS — I49.9 ABNORMAL HEART RHYTHM: ICD-10-CM

## 2023-11-28 DIAGNOSIS — R07.9 CHEST PAIN: ICD-10-CM

## 2023-11-28 LAB
ABO + RH BLD: NORMAL
ALBUMIN SERPL BCP-MCNC: 4 G/DL (ref 3.5–5.2)
ALP SERPL-CCNC: 100 U/L (ref 55–135)
ALT SERPL W/O P-5'-P-CCNC: 22 U/L (ref 10–44)
ANION GAP SERPL CALC-SCNC: 8 MMOL/L (ref 8–16)
AST SERPL-CCNC: 24 U/L (ref 10–40)
BASOPHILS # BLD AUTO: 0.03 K/UL (ref 0–0.2)
BASOPHILS NFR BLD: 0.6 % (ref 0–1.9)
BILIRUB SERPL-MCNC: 2.1 MG/DL (ref 0.1–1)
BLD GP AB SCN CELLS X3 SERPL QL: NORMAL
BUN SERPL-MCNC: 15 MG/DL (ref 6–20)
CALCIUM SERPL-MCNC: 9.1 MG/DL (ref 8.7–10.5)
CHLORIDE SERPL-SCNC: 105 MMOL/L (ref 95–110)
CO2 SERPL-SCNC: 25 MMOL/L (ref 23–29)
CREAT SERPL-MCNC: 0.8 MG/DL (ref 0.5–1.4)
DIFFERENTIAL METHOD: ABNORMAL
EOSINOPHIL # BLD AUTO: 0.2 K/UL (ref 0–0.5)
EOSINOPHIL NFR BLD: 2.8 % (ref 0–8)
ERYTHROCYTE [DISTWIDTH] IN BLOOD BY AUTOMATED COUNT: 13.3 % (ref 11.5–14.5)
EST. GFR  (NO RACE VARIABLE): >60 ML/MIN/1.73 M^2
GLUCOSE SERPL-MCNC: 88 MG/DL (ref 70–110)
HCT VFR BLD AUTO: 48.4 % (ref 40–54)
HGB BLD-MCNC: 16.4 G/DL (ref 14–18)
IMM GRANULOCYTES # BLD AUTO: 0.02 K/UL (ref 0–0.04)
IMM GRANULOCYTES NFR BLD AUTO: 0.4 % (ref 0–0.5)
INR PPP: 1.2 (ref 0.8–1.2)
LYMPHOCYTES # BLD AUTO: 0.7 K/UL (ref 1–4.8)
LYMPHOCYTES NFR BLD: 13.6 % (ref 18–48)
MCH RBC QN AUTO: 31.7 PG (ref 27–31)
MCHC RBC AUTO-ENTMCNC: 33.9 G/DL (ref 32–36)
MCV RBC AUTO: 93 FL (ref 82–98)
MONOCYTES # BLD AUTO: 0.6 K/UL (ref 0.3–1)
MONOCYTES NFR BLD: 11.2 % (ref 4–15)
NEUTROPHILS # BLD AUTO: 3.8 K/UL (ref 1.8–7.7)
NEUTROPHILS NFR BLD: 71.4 % (ref 38–73)
NRBC BLD-RTO: 0 /100 WBC
PLATELET # BLD AUTO: 90 K/UL (ref 150–450)
PMV BLD AUTO: 10.4 FL (ref 9.2–12.9)
POCT GLUCOSE: 78 MG/DL (ref 70–110)
POTASSIUM SERPL-SCNC: 4.2 MMOL/L (ref 3.5–5.1)
PROT SERPL-MCNC: 7.1 G/DL (ref 6–8.4)
PROTHROMBIN TIME: 12.6 SEC (ref 9–12.5)
RBC # BLD AUTO: 5.18 M/UL (ref 4.6–6.2)
SODIUM SERPL-SCNC: 138 MMOL/L (ref 136–145)
SPECIMEN OUTDATE: NORMAL
WBC # BLD AUTO: 5.29 K/UL (ref 3.9–12.7)

## 2023-11-28 PROCEDURE — 36011 PLACE CATHETER IN VEIN: CPT | Performed by: PEDIATRICS

## 2023-11-28 PROCEDURE — 99152 PR MOD CONSCIOUS SEDATION, SAME PHYS, 5+ YRS, FIRST 15 MIN: ICD-10-PCS | Mod: ,,, | Performed by: PEDIATRICS

## 2023-11-28 PROCEDURE — 63600175 PHARM REV CODE 636 W HCPCS: Performed by: NURSE ANESTHETIST, CERTIFIED REGISTERED

## 2023-11-28 PROCEDURE — 93567 NJX CAR CTH SPRVLV AORTGRPHY: CPT | Performed by: PEDIATRICS

## 2023-11-28 PROCEDURE — 86900 BLOOD TYPING SEROLOGIC ABO: CPT | Performed by: PEDIATRICS

## 2023-11-28 PROCEDURE — D9220A PRA ANESTHESIA: Mod: ANES,,, | Performed by: ANESTHESIOLOGY

## 2023-11-28 PROCEDURE — 63600175 PHARM REV CODE 636 W HCPCS: Performed by: ANESTHESIOLOGY

## 2023-11-28 PROCEDURE — 86920 COMPATIBILITY TEST SPIN: CPT | Performed by: PEDIATRICS

## 2023-11-28 PROCEDURE — 93568 NJX CAR CTH NSLC P-ART ANGRP: CPT | Mod: ,,, | Performed by: PEDIATRICS

## 2023-11-28 PROCEDURE — 93010 ELECTROCARDIOGRAM REPORT: CPT | Mod: ,,, | Performed by: PEDIATRICS

## 2023-11-28 PROCEDURE — 93662 INTRACARDIAC ECG (ICE): CPT | Mod: 26,,, | Performed by: PEDIATRICS

## 2023-11-28 PROCEDURE — 93567 NJX CAR CTH SPRVLV AORTGRPHY: CPT | Mod: ,,, | Performed by: PEDIATRICS

## 2023-11-28 PROCEDURE — 93597 R&L HRT CATH CHD ABNL NT CNJ: CPT | Performed by: PEDIATRICS

## 2023-11-28 PROCEDURE — 37000008 HC ANESTHESIA 1ST 15 MINUTES: Performed by: PEDIATRICS

## 2023-11-28 PROCEDURE — 85025 COMPLETE CBC W/AUTO DIFF WBC: CPT | Performed by: PEDIATRICS

## 2023-11-28 PROCEDURE — 93567 PR INJECT SUPRAVALVULAR AORTOGRAPHY DURING HEART CATH: ICD-10-PCS | Mod: ,,, | Performed by: PEDIATRICS

## 2023-11-28 PROCEDURE — 37000009 HC ANESTHESIA EA ADD 15 MINS: Performed by: PEDIATRICS

## 2023-11-28 PROCEDURE — 93010 ELECTROCARDIOGRAM REPORT: CPT | Mod: 76,,, | Performed by: PEDIATRICS

## 2023-11-28 PROCEDURE — 93662 INTRACARDIAC ECG (ICE): CPT | Performed by: PEDIATRICS

## 2023-11-28 PROCEDURE — D9220A PRA ANESTHESIA: ICD-10-PCS | Mod: ANES,,, | Performed by: ANESTHESIOLOGY

## 2023-11-28 PROCEDURE — 75825 PR  VENOGRAM INFER VENA CAVA: ICD-10-PCS | Mod: 26,,, | Performed by: PEDIATRICS

## 2023-11-28 PROCEDURE — 99152 MOD SED SAME PHYS/QHP 5/>YRS: CPT | Mod: ,,, | Performed by: PEDIATRICS

## 2023-11-28 PROCEDURE — 93662 PR INTRACARD ECHO, THER/DX INTERVENT: ICD-10-PCS | Mod: 26,,, | Performed by: PEDIATRICS

## 2023-11-28 PROCEDURE — 75820 VEIN X-RAY ARM/LEG: CPT | Performed by: PEDIATRICS

## 2023-11-28 PROCEDURE — C1894 INTRO/SHEATH, NON-LASER: HCPCS | Performed by: PEDIATRICS

## 2023-11-28 PROCEDURE — C1769 GUIDE WIRE: HCPCS | Performed by: PEDIATRICS

## 2023-11-28 PROCEDURE — C1751 CATH, INF, PER/CENT/MIDLINE: HCPCS | Performed by: PEDIATRICS

## 2023-11-28 PROCEDURE — 85347 COAGULATION TIME ACTIVATED: CPT | Performed by: PEDIATRICS

## 2023-11-28 PROCEDURE — 75825 VEIN X-RAY TRUNK: CPT | Mod: 26,,, | Performed by: PEDIATRICS

## 2023-11-28 PROCEDURE — 25000003 PHARM REV CODE 250: Performed by: NURSE ANESTHETIST, CERTIFIED REGISTERED

## 2023-11-28 PROCEDURE — D9220A PRA ANESTHESIA: ICD-10-PCS | Mod: CRNA,,, | Performed by: NURSE ANESTHETIST, CERTIFIED REGISTERED

## 2023-11-28 PROCEDURE — A4216 STERILE WATER/SALINE, 10 ML: HCPCS | Performed by: PHYSICIAN ASSISTANT

## 2023-11-28 PROCEDURE — C1753 CATH, INTRAVAS ULTRASOUND: HCPCS | Performed by: PEDIATRICS

## 2023-11-28 PROCEDURE — 85610 PROTHROMBIN TIME: CPT | Performed by: PEDIATRICS

## 2023-11-28 PROCEDURE — 80053 COMPREHEN METABOLIC PANEL: CPT | Performed by: PEDIATRICS

## 2023-11-28 PROCEDURE — 93597 PR RT & LT HEART CATH W/IMG GUID, ABNORMAL NATIVE CONNECT: ICD-10-PCS | Mod: 26,22,, | Performed by: PEDIATRICS

## 2023-11-28 PROCEDURE — 25500020 PHARM REV CODE 255: Performed by: PEDIATRICS

## 2023-11-28 PROCEDURE — 36011 PR PLACE CATH IN VEIN,SELECT: ICD-10-PCS | Mod: RT,,, | Performed by: PEDIATRICS

## 2023-11-28 PROCEDURE — 93010 EKG 12-LEAD: ICD-10-PCS | Mod: ,,, | Performed by: PEDIATRICS

## 2023-11-28 PROCEDURE — 93568 PR INJECT PULMONARY ANGIOGRAPHY DURING HEART CATH: ICD-10-PCS | Mod: ,,, | Performed by: PEDIATRICS

## 2023-11-28 PROCEDURE — 75825 VEIN X-RAY TRUNK: CPT | Performed by: PEDIATRICS

## 2023-11-28 PROCEDURE — 93568 NJX CAR CTH NSLC P-ART ANGRP: CPT | Performed by: PEDIATRICS

## 2023-11-28 PROCEDURE — 93597 R&L HRT CATH CHD ABNL NT CNJ: CPT | Mod: 26,22,, | Performed by: PEDIATRICS

## 2023-11-28 PROCEDURE — 36011 PLACE CATHETER IN VEIN: CPT | Mod: RT,,, | Performed by: PEDIATRICS

## 2023-11-28 PROCEDURE — 63600175 PHARM REV CODE 636 W HCPCS: Performed by: INTERNAL MEDICINE

## 2023-11-28 PROCEDURE — 75820 VEIN X-RAY ARM/LEG: CPT | Mod: 26,,, | Performed by: PEDIATRICS

## 2023-11-28 PROCEDURE — 20600001 HC STEP DOWN PRIVATE ROOM

## 2023-11-28 PROCEDURE — D9220A PRA ANESTHESIA: Mod: CRNA,,, | Performed by: NURSE ANESTHETIST, CERTIFIED REGISTERED

## 2023-11-28 PROCEDURE — 25000003 PHARM REV CODE 250: Performed by: PHYSICIAN ASSISTANT

## 2023-11-28 PROCEDURE — 75820 PR VENOGRAM EXTREMITY UNILAT: ICD-10-PCS | Mod: 26,,, | Performed by: PEDIATRICS

## 2023-11-28 RX ORDER — FENTANYL CITRATE 50 UG/ML
25 INJECTION, SOLUTION INTRAMUSCULAR; INTRAVENOUS EVERY 5 MIN PRN
Status: CANCELLED | OUTPATIENT
Start: 2023-11-28

## 2023-11-28 RX ORDER — MIDAZOLAM HYDROCHLORIDE 1 MG/ML
INJECTION, SOLUTION INTRAMUSCULAR; INTRAVENOUS
Status: DISCONTINUED | OUTPATIENT
Start: 2023-11-28 | End: 2023-11-28

## 2023-11-28 RX ORDER — WARFARIN 10 MG/1
10 TABLET ORAL
Status: DISCONTINUED | OUTPATIENT
Start: 2023-12-02 | End: 2023-11-28

## 2023-11-28 RX ORDER — SOTALOL HYDROCHLORIDE 80 MG/1
80 TABLET ORAL 2 TIMES DAILY
Status: DISCONTINUED | OUTPATIENT
Start: 2023-11-28 | End: 2023-12-01 | Stop reason: HOSPADM

## 2023-11-28 RX ORDER — HEPARIN SODIUM 1000 [USP'U]/ML
INJECTION, SOLUTION INTRAVENOUS; SUBCUTANEOUS
Status: DISCONTINUED | OUTPATIENT
Start: 2023-11-28 | End: 2023-11-28

## 2023-11-28 RX ORDER — LISINOPRIL 20 MG/1
20 TABLET ORAL DAILY
Status: DISCONTINUED | OUTPATIENT
Start: 2023-11-29 | End: 2023-12-01 | Stop reason: HOSPADM

## 2023-11-28 RX ORDER — MAG HYDROX/ALUMINUM HYD/SIMETH 200-200-20
30 SUSPENSION, ORAL (FINAL DOSE FORM) ORAL 4 TIMES DAILY PRN
Status: DISCONTINUED | OUTPATIENT
Start: 2023-11-28 | End: 2023-12-01 | Stop reason: HOSPADM

## 2023-11-28 RX ORDER — PROCHLORPERAZINE EDISYLATE 5 MG/ML
5 INJECTION INTRAMUSCULAR; INTRAVENOUS EVERY 6 HOURS PRN
Status: DISCONTINUED | OUTPATIENT
Start: 2023-11-28 | End: 2023-12-01 | Stop reason: HOSPADM

## 2023-11-28 RX ORDER — SODIUM CHLORIDE 0.9 % (FLUSH) 0.9 %
10 SYRINGE (ML) INJECTION
Status: CANCELLED | OUTPATIENT
Start: 2023-11-28

## 2023-11-28 RX ORDER — SODIUM CHLORIDE 0.9 % (FLUSH) 0.9 %
10 SYRINGE (ML) INJECTION EVERY 8 HOURS
Status: DISCONTINUED | OUTPATIENT
Start: 2023-11-28 | End: 2023-12-01 | Stop reason: HOSPADM

## 2023-11-28 RX ORDER — CEFAZOLIN SODIUM 1 G/3ML
INJECTION, POWDER, FOR SOLUTION INTRAMUSCULAR; INTRAVENOUS
Status: DISCONTINUED | OUTPATIENT
Start: 2023-11-28 | End: 2023-11-28

## 2023-11-28 RX ORDER — IBUPROFEN 200 MG
24 TABLET ORAL
Status: DISCONTINUED | OUTPATIENT
Start: 2023-11-28 | End: 2023-12-01 | Stop reason: HOSPADM

## 2023-11-28 RX ORDER — FUROSEMIDE 20 MG/1
20 TABLET ORAL DAILY PRN
Status: DISCONTINUED | OUTPATIENT
Start: 2023-11-28 | End: 2023-12-01 | Stop reason: HOSPADM

## 2023-11-28 RX ORDER — FENTANYL CITRATE 50 UG/ML
25 INJECTION, SOLUTION INTRAMUSCULAR; INTRAVENOUS EVERY 5 MIN PRN
Status: DISCONTINUED | OUTPATIENT
Start: 2023-11-28 | End: 2023-11-28

## 2023-11-28 RX ORDER — IBUPROFEN 200 MG
16 TABLET ORAL
Status: DISCONTINUED | OUTPATIENT
Start: 2023-11-28 | End: 2023-12-01 | Stop reason: HOSPADM

## 2023-11-28 RX ORDER — ENOXAPARIN SODIUM 100 MG/ML
1 INJECTION SUBCUTANEOUS EVERY 12 HOURS
Status: DISCONTINUED | OUTPATIENT
Start: 2023-11-28 | End: 2023-11-29

## 2023-11-28 RX ORDER — ONDANSETRON 2 MG/ML
4 INJECTION INTRAMUSCULAR; INTRAVENOUS DAILY PRN
Status: DISCONTINUED | OUTPATIENT
Start: 2023-11-28 | End: 2023-12-01 | Stop reason: HOSPADM

## 2023-11-28 RX ORDER — SODIUM CHLORIDE 0.9 % (FLUSH) 0.9 %
10 SYRINGE (ML) INJECTION
Status: DISCONTINUED | OUTPATIENT
Start: 2023-11-28 | End: 2023-12-01 | Stop reason: HOSPADM

## 2023-11-28 RX ORDER — PROCHLORPERAZINE EDISYLATE 5 MG/ML
5 INJECTION INTRAMUSCULAR; INTRAVENOUS EVERY 30 MIN PRN
Status: CANCELLED | OUTPATIENT
Start: 2023-11-28

## 2023-11-28 RX ORDER — DIGOXIN 125 MCG
0.25 TABLET ORAL EVERY MORNING
Status: DISCONTINUED | OUTPATIENT
Start: 2023-11-29 | End: 2023-12-01 | Stop reason: HOSPADM

## 2023-11-28 RX ORDER — METOCLOPRAMIDE HYDROCHLORIDE 5 MG/ML
10 INJECTION INTRAMUSCULAR; INTRAVENOUS EVERY 10 MIN PRN
Status: CANCELLED | OUTPATIENT
Start: 2023-11-28

## 2023-11-28 RX ORDER — IODIXANOL 320 MG/ML
INJECTION, SOLUTION INTRAVASCULAR
Status: DISCONTINUED | OUTPATIENT
Start: 2023-11-28 | End: 2023-12-01 | Stop reason: HOSPADM

## 2023-11-28 RX ORDER — HEPARIN SODIUM 5000 [USP'U]/ML
5000 INJECTION, SOLUTION INTRAVENOUS; SUBCUTANEOUS EVERY 8 HOURS
Status: DISCONTINUED | OUTPATIENT
Start: 2023-11-28 | End: 2023-11-28

## 2023-11-28 RX ORDER — FUROSEMIDE 20 MG/1
20 TABLET ORAL DAILY
Status: DISCONTINUED | OUTPATIENT
Start: 2023-11-28 | End: 2023-11-28

## 2023-11-28 RX ORDER — FENTANYL CITRATE 50 UG/ML
INJECTION, SOLUTION INTRAMUSCULAR; INTRAVENOUS
Status: DISCONTINUED | OUTPATIENT
Start: 2023-11-28 | End: 2023-11-28

## 2023-11-28 RX ORDER — POLYETHYLENE GLYCOL 3350 17 G/17G
17 POWDER, FOR SOLUTION ORAL DAILY PRN
Status: DISCONTINUED | OUTPATIENT
Start: 2023-11-28 | End: 2023-12-01 | Stop reason: HOSPADM

## 2023-11-28 RX ORDER — SIMETHICONE 80 MG
1 TABLET,CHEWABLE ORAL 4 TIMES DAILY PRN
Status: DISCONTINUED | OUTPATIENT
Start: 2023-11-28 | End: 2023-12-01 | Stop reason: HOSPADM

## 2023-11-28 RX ORDER — ACETAMINOPHEN 10 MG/ML
1000 INJECTION, SOLUTION INTRAVENOUS ONCE
Status: CANCELLED | OUTPATIENT
Start: 2023-11-28 | End: 2023-11-28

## 2023-11-28 RX ORDER — NALOXONE HCL 0.4 MG/ML
0.02 VIAL (ML) INJECTION
Status: DISCONTINUED | OUTPATIENT
Start: 2023-11-28 | End: 2023-12-01 | Stop reason: HOSPADM

## 2023-11-28 RX ORDER — TALC
6 POWDER (GRAM) TOPICAL NIGHTLY PRN
Status: DISCONTINUED | OUTPATIENT
Start: 2023-11-28 | End: 2023-12-01 | Stop reason: HOSPADM

## 2023-11-28 RX ORDER — METOPROLOL SUCCINATE 100 MG/1
300 TABLET, EXTENDED RELEASE ORAL DAILY
Status: DISCONTINUED | OUTPATIENT
Start: 2023-11-29 | End: 2023-12-01 | Stop reason: HOSPADM

## 2023-11-28 RX ORDER — PROPOFOL 10 MG/ML
VIAL (ML) INTRAVENOUS
Status: DISCONTINUED | OUTPATIENT
Start: 2023-11-28 | End: 2023-11-28

## 2023-11-28 RX ORDER — MEPERIDINE HYDROCHLORIDE 50 MG/ML
12.5 INJECTION INTRAMUSCULAR; INTRAVENOUS; SUBCUTANEOUS EVERY 10 MIN PRN
Status: CANCELLED | OUTPATIENT
Start: 2023-11-28 | End: 2023-11-29

## 2023-11-28 RX ORDER — GLUCAGON 1 MG
1 KIT INJECTION
Status: DISCONTINUED | OUTPATIENT
Start: 2023-11-28 | End: 2023-12-01 | Stop reason: HOSPADM

## 2023-11-28 RX ORDER — ACETAMINOPHEN 325 MG/1
650 TABLET ORAL EVERY 4 HOURS PRN
Status: DISCONTINUED | OUTPATIENT
Start: 2023-11-28 | End: 2023-12-01 | Stop reason: HOSPADM

## 2023-11-28 RX ADMIN — MIDAZOLAM HYDROCHLORIDE 1 MG: 1 INJECTION, SOLUTION INTRAMUSCULAR; INTRAVENOUS at 12:11

## 2023-11-28 RX ADMIN — SODIUM CHLORIDE: 0.9 INJECTION, SOLUTION INTRAVENOUS at 10:11

## 2023-11-28 RX ADMIN — Medication 10 ML: at 10:11

## 2023-11-28 RX ADMIN — CEFAZOLIN 2 G: 330 INJECTION, POWDER, FOR SOLUTION INTRAMUSCULAR; INTRAVENOUS at 11:11

## 2023-11-28 RX ADMIN — PROPOFOL 10 MG: 10 INJECTION, EMULSION INTRAVENOUS at 12:11

## 2023-11-28 RX ADMIN — FENTANYL CITRATE 25 MCG: 50 INJECTION, SOLUTION INTRAMUSCULAR; INTRAVENOUS at 11:11

## 2023-11-28 RX ADMIN — HEPARIN SODIUM 5000 UNITS: 1000 INJECTION, SOLUTION INTRAVENOUS; SUBCUTANEOUS at 11:11

## 2023-11-28 RX ADMIN — PROPOFOL 30 MG: 10 INJECTION, EMULSION INTRAVENOUS at 12:11

## 2023-11-28 RX ADMIN — FENTANYL CITRATE 25 MCG: 50 INJECTION, SOLUTION INTRAMUSCULAR; INTRAVENOUS at 12:11

## 2023-11-28 RX ADMIN — SOTALOL HYDROCHLORIDE 80 MG: 80 TABLET ORAL at 07:11

## 2023-11-28 RX ADMIN — WARFARIN SODIUM 8 MG: 7.5 TABLET ORAL at 07:11

## 2023-11-28 RX ADMIN — MIDAZOLAM HYDROCHLORIDE 1 MG: 1 INJECTION, SOLUTION INTRAMUSCULAR; INTRAVENOUS at 11:11

## 2023-11-28 RX ADMIN — MIDAZOLAM HYDROCHLORIDE 2 MG: 1 INJECTION, SOLUTION INTRAMUSCULAR; INTRAVENOUS at 11:11

## 2023-11-28 RX ADMIN — ENOXAPARIN SODIUM 70 MG: 80 INJECTION SUBCUTANEOUS at 08:11

## 2023-11-28 NOTE — Clinical Note
The catheter was repositioned into the left pulmonary artery. Hemodynamics were performed.  The patient's O2 saturation measured 79%.

## 2023-11-28 NOTE — Clinical Note
The catheter was repositioned into the superior vena cava. Hemodynamics were performed.  The patient's O2 saturation measured 75%.

## 2023-11-28 NOTE — Clinical Note
The catheter was inserted into the inferior vena cava. Hemodynamics were performed.  The patient's O2 saturation measured 85%.

## 2023-11-28 NOTE — PLAN OF CARE
Patient recovering following cath procedure. Cath site dressing CDI with 2+ pulses in feet. Skin dusky (baseline for pt)  with CRT 3-4 seconds. Plan to remain flat until 1645   and transfer to floor this evening. Family at bedside. Plan of care reviewed and questions answered.

## 2023-11-28 NOTE — SUBJECTIVE & OBJECTIVE
Past Medical History:   Diagnosis Date    Asthma     Atrial flutter     Cyanosis     Heart murmur     Intra-atrial reentrant tachycardia     MIGUEL (obstructive sleep apnea) 10/23/2014    TGA (transposition of great arteries)     Tricuspid atresia     VSD (ventricular septal defect)        Past Surgical History:   Procedure Laterality Date    CARDIAC CATHETERIZATION      CARDIOVERSION      ECHOCARDIOGRAM,TRANSESOPHAGEAL N/A 10/5/2023    Procedure: Transesophageal echo (JUNIE) intra-procedure log documentation;  Surgeon: Chang Dutton MD;  Location: [facility] EP LAB;  Service: Cardiology;  Laterality: N/A;    FONTAN PROCEDURE, EXTRACARDIAC      RADIOFREQUENCY ABLATION      REPLACEMENT OF PACEMAKER Right 10/08/2020    Procedure: REPLACEMENT-PACEMAKER GENERATOR;  Surgeon: Chang Garcia MD;  Location: [facility] OR [address] [place] [address];  Service: Cardiovascular;  Laterality: Right;    TREATMENT OF CARDIAC ARRHYTHMIA N/A 10/5/2023    Procedure: CARDIOVERSION;  Surgeon: Weiland, Michael D. Jr., MD;  Location: [facility] EP LAB;  Service: Cardiology;  Laterality: N/A;  Adult congenital heart; Fontan; AF, JUNIE, DCCV, MAC, ADRIANA Dutton/ M. Weiland - will go to Peds cath prep       Review of patient's allergies indicates:  No Known Allergies    No current facility-administered medications on file prior to encounter.     Current Outpatient Medications on File Prior to Encounter   Medication Sig    digoxin (LANOXIN) 250 mcg tablet Take 1 tablet (0.25 mg total) by mouth every morning.    enalapril (VASOTEC) 10 MG tablet Take 1 tablet (10 mg total) by mouth 2 (two) times daily.    furosemide (LASIX) 20 MG tablet Take 1 tablet (20 mg total) by mouth once daily.    metoprolol succinate (TOPROL-XL) 100 MG 24 hr tablet Take 3 tablets (300 mg total) by mouth once daily.    sildenafil (REVATIO) 20 mg Tab Take 1 tablet (20 mg total) by mouth 2 (two) times daily.    warfarin (COUMADIN) 2 MG tablet Take 10 mg (6mg + 2 (2mg) tabs every Saturday and 8mg (6mg +2 mg) all  other days as directed    warfarin (COUMADIN) 6 MG tablet TAKE 10MG (6MG + 2 (2MG) TABS EVERY SATURDAY AND 8MG (6MG + 2MG) TABS ALL OTHER DAYS AS DIRECTED    sotaloL (BETAPACE) 240 MG Tab TAKE 1 TABLET (240 MG TOTAL) BY MOUTH 2 (TWO) TIMES DAILY. (Patient not taking: Reported on 11/2/2023)     Family History       Problem Relation (Age of Onset)    Diabetes Paternal Grandfather, Paternal Aunt    Heart disease Maternal Grandfather    No Known Problems Mother, Father, Sister, Brother, Maternal Grandmother, Paternal Grandmother, Maternal Aunt, Maternal Uncle, Paternal Uncle          Tobacco Use    Smoking status: Never    Smokeless tobacco: Never   Substance and Sexual Activity    Alcohol use: No    Drug use: No    Sexual activity: Not on file     Review of Systems   Constitutional:  Negative for chills, fatigue and fever.   Respiratory:  Negative for cough, chest tightness, shortness of breath and wheezing.    Cardiovascular:  Negative for chest pain, palpitations and leg swelling.   Gastrointestinal:  Negative for abdominal distention, abdominal pain, blood in stool, constipation, diarrhea, nausea and vomiting.   Genitourinary:  Negative for difficulty urinating, dysuria, frequency, hematuria and urgency.   Musculoskeletal:  Negative for arthralgias and back pain.   Neurological:  Negative for dizziness, tremors, seizures, syncope, weakness, light-headedness, numbness and headaches.   Psychiatric/Behavioral:  Negative for agitation and confusion. The patient is not nervous/anxious.      Objective:     Vital Signs (Most Recent):  Temp: 98 °F (36.7 °C) (11/28/23 1257)  Pulse: 60 (11/28/23 1400)  Resp: 19 (11/28/23 1400)  BP: 124/60 (11/28/23 1400)  SpO2: (!) 90 % (11/28/23 1400) Vital Signs (24h Range):  Temp:  [98 °F (36.7 °C)-98.8 °F (37.1 °C)] 98 °F (36.7 °C)  Pulse:  [59-62] 60  Resp:  [14-20] 19  SpO2:  [87 %-90 %] 90 %  BP: (100-146)/(51-68) 124/60     Weight: 73.5 kg (162 lb)  Body mass index is 28.7 kg/m².      Physical Exam  Vitals and nursing note reviewed.   Constitutional:       General: He is not in acute distress.     Appearance: He is well-developed. He is not diaphoretic.   HENT:      Head: Normocephalic and atraumatic.      Right Ear: External ear normal.      Left Ear: External ear normal.      Nose: Nose normal. No congestion.      Mouth/Throat:      Pharynx: Oropharynx is clear.   Eyes:      General: No scleral icterus.     Extraocular Movements: Extraocular movements intact.   Cardiovascular:      Rate and Rhythm: Normal rate and regular rhythm.      Pulses: Normal pulses.      Heart sounds: Normal heart sounds.   Pulmonary:      Effort: Pulmonary effort is normal. No respiratory distress.      Breath sounds: Normal breath sounds. No wheezing or rales.   Abdominal:      General: Bowel sounds are normal. There is no distension.      Palpations: Abdomen is soft.      Tenderness: There is no abdominal tenderness. There is no guarding or rebound.   Musculoskeletal:      Cervical back: Normal range of motion.      Right lower leg: No edema.      Left lower leg: No edema.   Skin:     General: Skin is warm and dry.      Capillary Refill: Capillary refill takes less than 2 seconds.   Neurological:      General: No focal deficit present.      Mental Status: He is alert and oriented to person, place, and time. Mental status is at baseline.   Psychiatric:         Mood and Affect: Mood normal.         Behavior: Behavior normal.         Thought Content: Thought content normal.                Significant Labs: All pertinent labs within the past 24 hours have been reviewed.  CBC:   Recent Labs   Lab 11/28/23  0915   WBC 5.29   HGB 16.4   HCT 48.4   PLT 90*     CMP:   Recent Labs   Lab 11/28/23  0915      K 4.2      CO2 25   GLU 88   BUN 15   CREATININE 0.8   CALCIUM 9.1   PROT 7.1   ALBUMIN 4.0   BILITOT 2.1*   ALKPHOS 100   AST 24   ALT 22   ANIONGAP 8     Coagulation:   Recent Labs   Lab 11/28/23  0915   INR  1.2       Significant Imaging: I have reviewed all pertinent imaging results/findings within the past 24 hours.

## 2023-11-28 NOTE — H&P
[facility] Cath Lab  [facility] Medicine  History & Physical    Patient Name: Marcello Briggs  MRN: [id number]  Patient Class: IP- Inpatient  Admission Date: 11/28/2023  Attending Physician: Shlomo Boykin MD   Primary Care Provider: Kevin Chaves MD         Patient information was obtained from past medical records, ER records, and primary team.     Subjective:     Principal Problem:S/P Fontan procedure    Chief Complaint: No chief complaint on file.       HPI: Mr. Marcello Briggs is a 48 y.o. 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 Pt underwent a combined right and left retrograde heart cath, and was initially found to be in A flutter which was address w/ intracardiac echo and DCCV w/ cardiology. Pt is in recovery following the procedures and is requiring PO sotalol initiation. Pt reports mild groin pain and otherwise has no new complaints.    Past Medical History:   Diagnosis Date    Asthma     Atrial flutter     Cyanosis     Heart murmur     Intra-atrial reentrant tachycardia     MIGUEL (obstructive sleep apnea) 10/23/2014    TGA (transposition of great arteries)     Tricuspid atresia     VSD (ventricular septal defect)        Past Surgical History:   Procedure Laterality Date    CARDIAC CATHETERIZATION      CARDIOVERSION      ECHOCARDIOGRAM,TRANSESOPHAGEAL N/A 10/5/2023    Procedure: Transesophageal echo (JUNIE) intra-procedure log documentation;  Surgeon: Chang Dutton MD;  Location: [facility] EP LAB;  Service: Cardiology;  Laterality: N/A;    FONTAN PROCEDURE, EXTRACARDIAC      RADIOFREQUENCY ABLATION      REPLACEMENT OF PACEMAKER Right 10/08/2020    Procedure: REPLACEMENT-PACEMAKER GENERATOR;  Surgeon: Chang Garcia MD;  Location: [facility] OR [address] [place] [address];  Service: Cardiovascular;  Laterality: Right;    TREATMENT OF CARDIAC ARRHYTHMIA N/A 10/5/2023    Procedure:  CARDIOVERSION;  Surgeon: Weiland, Michael D. Jr., MD;  Location: [facility] EP LAB;  Service: Cardiology;  Laterality: N/A;  Adult congenital heart; Fontan; AF, JUNIE, DCCV, MAC, ADRIANA Dutton/ M. Weiland - will go to [facility] cath prep       Review of patient's allergies indicates:  No Known Allergies    No current facility-administered medications on file prior to encounter.     Current Outpatient Medications on File Prior to Encounter   Medication Sig    digoxin (LANOXIN) 250 mcg tablet Take 1 tablet (0.25 mg total) by mouth every morning.    enalapril (VASOTEC) 10 MG tablet Take 1 tablet (10 mg total) by mouth 2 (two) times daily.    furosemide (LASIX) 20 MG tablet Take 1 tablet (20 mg total) by mouth once daily.    metoprolol succinate (TOPROL-XL) 100 MG 24 hr tablet Take 3 tablets (300 mg total) by mouth once daily.    sildenafil (REVATIO) 20 mg Tab Take 1 tablet (20 mg total) by mouth 2 (two) times daily.    warfarin (COUMADIN) 2 MG tablet Take 10 mg (6mg + 2 (2mg) tabs every Saturday and 8mg (6mg +2 mg) all other days as directed    warfarin (COUMADIN) 6 MG tablet TAKE 10MG (6MG + 2 (2MG) TABS EVERY SATURDAY AND 8MG (6MG + 2MG) TABS ALL OTHER DAYS AS DIRECTED    sotaloL (BETAPACE) 240 MG Tab TAKE 1 TABLET (240 MG TOTAL) BY MOUTH 2 (TWO) TIMES DAILY. (Patient not taking: Reported on 11/2/2023)     Family History       Problem Relation (Age of Onset)    Diabetes Paternal Grandfather, Paternal Aunt    Heart disease Maternal Grandfather    No Known Problems Mother, Father, Sister, Brother, Maternal Grandmother, Paternal Grandmother, Maternal Aunt, Maternal Uncle, Paternal Uncle          Tobacco Use    Smoking status: Never    Smokeless tobacco: Never   Substance and Sexual Activity    Alcohol use: No    Drug use: No    Sexual activity: Not on file     Review of Systems   Constitutional:  Negative for chills, fatigue and fever.   Respiratory:  Negative for cough, chest tightness, shortness of breath and wheezing.     Cardiovascular:  Negative for chest pain, palpitations and leg swelling.   Gastrointestinal:  Negative for abdominal distention, abdominal pain, blood in stool, constipation, diarrhea, nausea and vomiting.   Genitourinary:  Negative for difficulty urinating, dysuria, frequency, hematuria and urgency.   Musculoskeletal:  Negative for arthralgias and back pain.   Neurological:  Negative for dizziness, tremors, seizures, syncope, weakness, light-headedness, numbness and headaches.   Psychiatric/Behavioral:  Negative for agitation and confusion. The patient is not nervous/anxious.      Objective:     Vital Signs (Most Recent):  Temp: 98 °F (36.7 °C) (11/28/23 1257)  Pulse: 60 (11/28/23 1400)  Resp: 19 (11/28/23 1400)  BP: 124/60 (11/28/23 1400)  SpO2: (!) 90 % (11/28/23 1400) Vital Signs (24h Range):  Temp:  [98 °F (36.7 °C)-98.8 °F (37.1 °C)] 98 °F (36.7 °C)  Pulse:  [59-62] 60  Resp:  [14-20] 19  SpO2:  [87 %-90 %] 90 %  BP: (100-146)/(51-68) 124/60     Weight: 73.5 kg (162 lb)  Body mass index is 28.7 kg/m².     Physical Exam  Vitals and nursing note reviewed.   Constitutional:       General: He is not in acute distress.     Appearance: He is well-developed. He is not diaphoretic.   HENT:      Head: Normocephalic and atraumatic.      Right Ear: External ear normal.      Left Ear: External ear normal.      Nose: Nose normal. No congestion.      Mouth/Throat:      Pharynx: Oropharynx is clear.   Eyes:      General: No scleral icterus.     Extraocular Movements: Extraocular movements intact.   Cardiovascular:      Rate and Rhythm: Normal rate and regular rhythm.      Pulses: Normal pulses.      Heart sounds: Normal heart sounds.   Pulmonary:      Effort: Pulmonary effort is normal. No respiratory distress.      Breath sounds: Normal breath sounds. No wheezing or rales.   Abdominal:      General: Bowel sounds are normal. There is no distension.      Palpations: Abdomen is soft.      Tenderness: There is no abdominal  tenderness. There is no guarding or rebound.   Musculoskeletal:      Cervical back: Normal range of motion.      Right lower leg: No edema.      Left lower leg: No edema.   Skin:     General: Skin is warm and dry.      Capillary Refill: Capillary refill takes less than 2 seconds.   Neurological:      General: No focal deficit present.      Mental Status: He is alert and oriented to person, place, and time. Mental status is at baseline.   Psychiatric:         Mood and Affect: Mood normal.         Behavior: Behavior normal.         Thought Content: Thought content normal.                Significant Labs: All pertinent labs within the past 24 hours have been reviewed.  CBC:   Recent Labs   Lab 11/28/23  0915   WBC 5.29   HGB 16.4   HCT 48.4   PLT 90*     CMP:   Recent Labs   Lab 11/28/23  0915      K 4.2      CO2 25   GLU 88   BUN 15   CREATININE 0.8   CALCIUM 9.1   PROT 7.1   ALBUMIN 4.0   BILITOT 2.1*   ALKPHOS 100   AST 24   ALT 22   ANIONGAP 8     Coagulation:   Recent Labs   Lab 11/28/23  0915   INR 1.2       Significant Imaging: I have reviewed all pertinent imaging results/findings within the past 24 hours.     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 Pt is in recovery following the procedures and is requiring PO sotalol initiation.    - AFVSS, no leukocytosis or electrolyte abnormalities  - start sotalol 80 mg bid po  - resume previous medications: Mtp succinate 300 mg qd, digoxin 0.25 mg qam, formulary lisnopril 20 mg qd, lasix 20 mg qd  - ECG stat prn  - K>4, Mg >2    Follow-up:   · 1 and 3 months remote transmission  · Agree with Dr. Acosta that catheterization would be useful  · At minimum 6 months clinic interrogation with ECG, echocardiogram, and 24hr  heart rhythm monitor    Long term current use of anticoagulant therapy  Cardiac pacemaker in situ  This patient has long term use on an anticoagulant with Select Anticoagulant(s): Warfarin/Coumadin. Their long term anticoagulation will be Held or Continued: continued. They are on long term anticoagulation due to Reason for Anticoagulation: Atrial fibrillation and DVT/PE.     - lovenox bridge for INR 1.2  - daily INR  - pharm consult      VTE Risk Mitigation (From admission, onward)           Ordered     warfarin (COUMADIN) tablet 10 mg  Every Saturday        See Hyperspace for full Linked Orders Report.    11/28/23 1438     warfarin (COUMADIN) tablet 8 mg  Daily        See Hyperspace for full Linked Orders Report.    11/28/23 1438     IP VTE HIGH RISK PATIENT  Once         11/28/23 1408     Place sequential compression device  Until discontinued         11/28/23 1408                                    Leandro Covarrubias PA-C  Department of Hospital Medicine  [facility] - Cath Lab

## 2023-11-28 NOTE — NURSING
Nurses Note -- 4 Eyes      11/28/2023   5:29 PM      Skin assessed during: Transfer      [x] No Altered Skin Integrity Present    [x]Prevention Measures Documented      [] Yes- Altered Skin Integrity Present or Discovered   [] LDA Added if Not in Epic (Describe Wound)   [] New Altered Skin Integrity was Present on Admit and Documented in LDA   [] Wound Image Taken    Wound Care Consulted? No    Attending Nurse:   Nini Nickerson RN    Second RN/Staff Member:   TERESO Moser

## 2023-11-28 NOTE — Clinical Note
The catheter was repositioned into the pulmonary artery. The angiography was performed via hand injection with 5 mL of contrast.

## 2023-11-28 NOTE — OR NURSING
Nursing Transfer Note    Sending Transfer Note      11/28/2023 5:28 PM  Transfer via wheelchair  From peds cath recover to [facility]   Transfered with cardiac monitor, o2 and bvm  Transported by: RN  Report given as documented in PER Handoff on Doc Flowsheet  VS's per Doc Flowsheet  Medicines sent: Yes  Chart sent with patient: Yes  What caregiver / guardian was Notified of transfer: Father and Sister  Emmie keller RN  11/28/2023 5:28 PM

## 2023-11-28 NOTE — HPI
Mr. Marcello Briggs is a 48 y.o. 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 Pt underwent a combined right and left retrograde heart cath, and was initially found to be in A flutter which was address w/ intracardiac echo and DCCV w/ cardiology. Pt is in recovery following the procedures and is requiring PO sotalol initiation. Pt reports mild groin pain and otherwise has no new complaints.

## 2023-11-28 NOTE — Clinical Note
The catheter was repositioned into the right pulmonary artery. Hemodynamics were performed.  The patient's O2 saturation measured 74%.

## 2023-11-28 NOTE — Clinical Note
The catheter was repositioned into the superior vena cava. The angiography was performed via power injection. The injected amount was 20 mL contrast at 20 mL/s. The PSI from the power injection was 900.

## 2023-11-28 NOTE — Clinical Note
An angiography was performed of the SVC. The angiography was performed via power injection. The injected amount was 30 mL contrast at 20 mL/s. The PSI from the power injection was 900.

## 2023-11-28 NOTE — Clinical Note
The catheter was repositioned into the inferior vena cava. The angiography was performed via power injection. The injected amount was 40 mL contrast at 20 mL/s. The PSI from the power injection was 900.

## 2023-11-28 NOTE — Clinical Note
The catheter was repositioned into the aorta. The angiography was performed via power injection. The injected amount was 40 mL contrast at 20 mL/s. The PSI from the power injection was 900.

## 2023-11-28 NOTE — DISCHARGE INSTRUCTIONS
AFTER THE PROCEDURE:  You may remove the bandage in 24 hours and wash with soap and water.  You may shower, but do not soak in a tub for three days.     PRECAUTIONS FOR THE NEXT 24 HOURS:  If you need to cough, sneeze, have a bowel movement, or bear down, hold pressure over your bandage.  Do not  anything heavier than a gallon of milk(about 5 pounds)  Avoid excessive bending over.    SYMPTOMS TO WATCH FOR AND REPORT TO YOUR DOCTOR:  BLEEDING: hold pressure over the site until bleeding stops. Proceed to Emergency Room by ambulance (do not drive yourself) if unable to stop bleeding. Notify your doctor.  HEMATOMA (hard bruise under the skin): Pillo around the bruise if one develops. Call your doctor if it increases in size or if you have difficulty talking, swallowing, breathing or anything unusual.  SIGNS OF INFECTION: Fever (temperature over 100.5 F), pus or redness  RASH  CHEST PAIN OR SHORTNESS OF BREATH    You may call the Pediatric Cardiology Service doctor on call at [phone].

## 2023-11-28 NOTE — TRANSFER OF CARE
"Anesthesia Transfer of Care Note    Patient: Marcello Briggs    Procedure(s) Performed: Procedure(s) (LRB):  Catheterization, Heart, Combined Right and Retrograde Left, for Congenital Heart Defect (N/A)  Angiogram, Pulmonary, Pediatric  Aortogram, Pediatric  Venogram, Cath Lab  ICE, Performed    Patient location: PACU    Anesthesia Type: MAC    Transport from OR: Transported from OR on 2-3 L/min O2 by NC with adequate spontaneous ventilation    Post pain: adequate analgesia    Post assessment: no apparent anesthetic complications    Post vital signs: stable    Level of consciousness: awake and responds to stimulation    Nausea/Vomiting: no nausea/vomiting    Complications: none    Transfer of care protocol was followed    Last vitals: Visit Vitals  /55   Pulse 60   Temp 37.1 °C (98.8 °F) (Temporal)   Resp 17   Ht 5' 3" (1.6 m)   Wt 73.5 kg (162 lb)   SpO2 88% on 3 L NC   BMI 28.70 kg/m²     "

## 2023-11-28 NOTE — INTERVAL H&P NOTE
The patient has been examined and the H&P has been reviewed:    I concur with the findings and changes have been noted since the H&P was written: ECG shows IART with physiologic ventricular rate.    Anesthesia/Surgery/catheterization risks, benefits and alternative options discussed and understood by patient/family.

## 2023-11-28 NOTE — CONSULTS
Hospital Medicine Pharmacy Consult Note    Pharmacy to review dose of warfarin    Marcello Briggs is a 48 y.o. male on warfarin therapy for congenital heart disease c/b sinus node dysfunction (s/p PPM) and arrhythmias (Atrial fibrillation, atrial flutter, IART, NS-VT). PharmD has been consulted for warfarin dosing.      Home dose: Most recent dose appears to be 10 mg Sat, 8 mg all other days based on fill history (patient currently in procedure)    Coumadin clinic enrollment: Not enrolled, follows with pediatric cardiology given congenital heart disease    INR goal: 2-3    Lab Results   Component Value Date    INR 1.2 11/28/2023    INR 1.3 (H) 09/07/2023    INR 1.6 (H) 10/08/2020       Drug Interactions: digoxin (home medication)      Assessment/Plan    S/p cardiac cath today. INR 1.2.    Start warfarin 8 mg daily for now (home dose appears to be 10 mg Sat, 8 mg all other days) - will follow up with patient tomorrow to confirm home dose (currently in PACU)  Agree with enoxaparin 1 mg/kg q12h until INR > 2  PT/INR lab daily while inpatient  Touch base with Myron Acosta/Trevon - appears there were possible plans to switch to DOAC after cath (see telephone encounter 11/10/23)    Thank you for the consult, will continue to follow    Sukh Giordano, Pharm.D., BCPS  [id number]      **Note: Consults are reviewed Monday-Friday 7:00am-3:30pm. The above recommendations are only suggested. The recommendations should be considered in conjunction with all patient factors.**

## 2023-11-29 LAB
ANION GAP SERPL CALC-SCNC: 12 MMOL/L (ref 8–16)
BASOPHILS # BLD AUTO: 0.05 K/UL (ref 0–0.2)
BASOPHILS NFR BLD: 1 % (ref 0–1.9)
BUN SERPL-MCNC: 15 MG/DL (ref 6–20)
CALCIUM SERPL-MCNC: 8.6 MG/DL (ref 8.7–10.5)
CHLORIDE SERPL-SCNC: 102 MMOL/L (ref 95–110)
CO2 SERPL-SCNC: 24 MMOL/L (ref 23–29)
CREAT SERPL-MCNC: 0.9 MG/DL (ref 0.5–1.4)
DIFFERENTIAL METHOD: ABNORMAL
EOSINOPHIL # BLD AUTO: 0.2 K/UL (ref 0–0.5)
EOSINOPHIL NFR BLD: 3 % (ref 0–8)
ERYTHROCYTE [DISTWIDTH] IN BLOOD BY AUTOMATED COUNT: 13.6 % (ref 11.5–14.5)
EST. GFR  (NO RACE VARIABLE): >60 ML/MIN/1.73 M^2
GLUCOSE SERPL-MCNC: 79 MG/DL (ref 70–110)
GLUCOSE SERPL-MCNC: 99 MG/DL (ref 70–110)
HCO3 UR-SCNC: 23.4 MMOL/L (ref 24–28)
HCT VFR BLD AUTO: 47.3 % (ref 40–54)
HCT VFR BLD CALC: 45 %PCV (ref 36–54)
HGB BLD-MCNC: 15.6 G/DL (ref 14–18)
IMM GRANULOCYTES # BLD AUTO: 0.03 K/UL (ref 0–0.04)
IMM GRANULOCYTES NFR BLD AUTO: 0.6 % (ref 0–0.5)
INR PPP: 1.3 (ref 0.8–1.2)
LYMPHOCYTES # BLD AUTO: 0.6 K/UL (ref 1–4.8)
LYMPHOCYTES NFR BLD: 12.2 % (ref 18–48)
MAGNESIUM SERPL-MCNC: 1.7 MG/DL (ref 1.6–2.6)
MCH RBC QN AUTO: 31.3 PG (ref 27–31)
MCHC RBC AUTO-ENTMCNC: 33 G/DL (ref 32–36)
MCV RBC AUTO: 95 FL (ref 82–98)
MONOCYTES # BLD AUTO: 0.6 K/UL (ref 0.3–1)
MONOCYTES NFR BLD: 11.8 % (ref 4–15)
NEUTROPHILS # BLD AUTO: 3.6 K/UL (ref 1.8–7.7)
NEUTROPHILS NFR BLD: 71.4 % (ref 38–73)
NRBC BLD-RTO: 0 /100 WBC
PCO2 BLDA: 36.9 MMHG (ref 35–45)
PH SMN: 7.41 [PH] (ref 7.35–7.45)
PLATELET # BLD AUTO: 79 K/UL (ref 150–450)
PMV BLD AUTO: 11.1 FL (ref 9.2–12.9)
PO2 BLDA: 62 MMHG (ref 80–100)
POC ACTIVATED CLOTTING TIME K: 217 SEC (ref 74–137)
POC BE: -1 MMOL/L
POC IONIZED CALCIUM: 1.21 MMOL/L (ref 1.06–1.42)
POC SATURATED O2: 92 % (ref 95–100)
POC TCO2: 25 MMOL/L (ref 23–27)
POTASSIUM BLD-SCNC: 4.1 MMOL/L (ref 3.5–5.1)
POTASSIUM SERPL-SCNC: 4.4 MMOL/L (ref 3.5–5.1)
PROTHROMBIN TIME: 13.7 SEC (ref 9–12.5)
RBC # BLD AUTO: 4.98 M/UL (ref 4.6–6.2)
SAMPLE: ABNORMAL
SAMPLE: ABNORMAL
SODIUM BLD-SCNC: 139 MMOL/L (ref 136–145)
SODIUM SERPL-SCNC: 138 MMOL/L (ref 136–145)
WBC # BLD AUTO: 5.02 K/UL (ref 3.9–12.7)

## 2023-11-29 PROCEDURE — A4216 STERILE WATER/SALINE, 10 ML: HCPCS | Performed by: PHYSICIAN ASSISTANT

## 2023-11-29 PROCEDURE — 36415 COLL VENOUS BLD VENIPUNCTURE: CPT | Performed by: PHYSICIAN ASSISTANT

## 2023-11-29 PROCEDURE — 93010 ELECTROCARDIOGRAM REPORT: CPT | Mod: ,,, | Performed by: INTERNAL MEDICINE

## 2023-11-29 PROCEDURE — 20600001 HC STEP DOWN PRIVATE ROOM

## 2023-11-29 PROCEDURE — 25000003 PHARM REV CODE 250: Performed by: PHYSICIAN ASSISTANT

## 2023-11-29 PROCEDURE — 83735 ASSAY OF MAGNESIUM: CPT | Performed by: PHYSICIAN ASSISTANT

## 2023-11-29 PROCEDURE — 93010 EKG 12-LEAD: ICD-10-PCS | Mod: ,,, | Performed by: INTERNAL MEDICINE

## 2023-11-29 PROCEDURE — 85610 PROTHROMBIN TIME: CPT | Performed by: PHYSICIAN ASSISTANT

## 2023-11-29 PROCEDURE — 93005 ELECTROCARDIOGRAM TRACING: CPT

## 2023-11-29 PROCEDURE — 25000003 PHARM REV CODE 250: Performed by: INTERNAL MEDICINE

## 2023-11-29 PROCEDURE — 85025 COMPLETE CBC W/AUTO DIFF WBC: CPT | Performed by: PHYSICIAN ASSISTANT

## 2023-11-29 PROCEDURE — 63600175 PHARM REV CODE 636 W HCPCS: Performed by: INTERNAL MEDICINE

## 2023-11-29 PROCEDURE — 80048 BASIC METABOLIC PNL TOTAL CA: CPT | Performed by: PHYSICIAN ASSISTANT

## 2023-11-29 RX ADMIN — Medication 10 ML: at 09:11

## 2023-11-29 RX ADMIN — METOPROLOL SUCCINATE 300 MG: 100 TABLET, EXTENDED RELEASE ORAL at 09:11

## 2023-11-29 RX ADMIN — ENOXAPARIN SODIUM 70 MG: 80 INJECTION SUBCUTANEOUS at 09:11

## 2023-11-29 RX ADMIN — APIXABAN 5 MG: 5 TABLET, FILM COATED ORAL at 09:11

## 2023-11-29 RX ADMIN — SOTALOL HYDROCHLORIDE 80 MG: 80 TABLET ORAL at 09:11

## 2023-11-29 RX ADMIN — LISINOPRIL 20 MG: 20 TABLET ORAL at 09:11

## 2023-11-29 RX ADMIN — Medication 10 ML: at 02:11

## 2023-11-29 RX ADMIN — DIGOXIN 0.25 MG: 125 TABLET ORAL at 09:11

## 2023-11-29 NOTE — PLAN OF CARE
Discussed with pharmacist and cardiologist.  We will switch patient to oral apixaban 5 mg twice daily.  Discontinue warfarin and Lovenox

## 2023-11-29 NOTE — SUBJECTIVE & OBJECTIVE
Interval History:  No complaints or acute events overnight.  Blood pressure and pulse stable.  INR of 1.3 this morning    Review of Systems   All other systems reviewed and are negative.    Objective:     Vital Signs (Most Recent):  Temp: 99.1 °F (37.3 °C) (11/29/23 0737)  Pulse: 60 (11/29/23 0737)  Resp: 20 (11/29/23 0737)  BP: 136/63 (11/29/23 0737)  SpO2: (!) 88 % (11/29/23 0737) Vital Signs (24h Range):  Temp:  [97.8 °F (36.6 °C)-99.1 °F (37.3 °C)] 99.1 °F (37.3 °C)  Pulse:  [54-66] 60  Resp:  [14-26] 20  SpO2:  [85 %-91 %] 88 %  BP: (100-146)/(51-68) 136/63     Weight: 70.2 kg (154 lb 12.2 oz)  Body mass index is 27.42 kg/m².    Intake/Output Summary (Last 24 hours) at 11/29/2023 0847  Last data filed at 11/28/2023 1700  Gross per 24 hour   Intake 450 ml   Output --   Net 450 ml         Physical Exam  Constitutional:       General: He is not in acute distress.  HENT:      Head: Normocephalic.      Right Ear: External ear normal.      Left Ear: External ear normal.      Nose: Nose normal.   Eyes:      General: No scleral icterus.  Cardiovascular:      Rate and Rhythm: Normal rate.      Heart sounds: Murmur heard.   Pulmonary:      Breath sounds: Normal breath sounds. No wheezing or rales.   Abdominal:      Palpations: Abdomen is soft.      Tenderness: There is no abdominal tenderness.   Musculoskeletal:      Right lower leg: No edema.      Left lower leg: No edema.   Skin:     General: Skin is warm.   Neurological:      General: No focal deficit present.      Mental Status: He is alert and oriented to person, place, and time.   Psychiatric:         Mood and Affect: Mood normal.         Thought Content: Thought content normal.             Significant Labs: All pertinent labs within the past 24 hours have been reviewed.

## 2023-11-29 NOTE — PLAN OF CARE
AAOX4,VSS,O2 sats>88% on room air. Plan of care discussed with patient.Patient has no complaints of pain/SOB. Discussed medications and care. Patient has no questions at this time.Pt visualised and stable.Call light within reach.Pt resting,bed at lowest position.    Problem: Adult Inpatient Plan of Care  Goal: Plan of Care Review  Outcome: Ongoing, Progressing  Goal: Patient-Specific Goal (Individualized)  Outcome: Ongoing, Progressing  Goal: Absence of Hospital-Acquired Illness or Injury  Outcome: Ongoing, Progressing  Goal: Optimal Comfort and Wellbeing  Outcome: Ongoing, Progressing  Goal: Readiness for Transition of Care  Outcome: Ongoing, Progressing     Problem: Infection  Goal: Absence of Infection Signs and Symptoms  Outcome: Ongoing, Progressing     Problem: Fall Injury Risk  Goal: Absence of Fall and Fall-Related Injury  Outcome: Ongoing, Progressing

## 2023-11-29 NOTE — ANESTHESIA POSTPROCEDURE EVALUATION
Anesthesia Post Evaluation    Patient: Marcello Briggs    Procedure(s) Performed: Procedure(s) (LRB):  Catheterization, Heart, Combined Right and Retrograde Left, for Congenital Heart Defect (N/A)  Angiogram, Pulmonary, Pediatric  Aortogram, Pediatric  Venogram, Cath Lab  ICE, Performed    Final Anesthesia Type: MAC      Patient location during evaluation: PACU  Patient participation: Yes- Able to Participate  Level of consciousness: awake and alert, awake and oriented  Post-procedure vital signs: reviewed and stable  Pain management: adequate  Airway patency: patent    PONV status at discharge: No PONV  Anesthetic complications: no      Cardiovascular status: blood pressure returned to baseline, stable and hemodynamically stable  Respiratory status: unassisted, spontaneous ventilation and room air  Hydration status: euvolemic  Follow-up not needed.      Vitals Value Taken Time   /56 11/28/23 2100   Temp 36.8 °C (98.3 °F) 11/28/23 2100   Pulse 60 11/28/23 2100   Resp 18 11/28/23 2100   SpO2 88 % 11/28/23 2100         No case tracking events are documented in the log.      Pain/Kaye Score: Presence of Pain: denies (11/28/2023  5:00 PM)  Kaye Score: 8 (11/28/2023  5:00 PM)

## 2023-11-29 NOTE — CONSULTS
Hospital Medicine Pharmacy Consult Note    Pharmacy to review dose of warfarin    Marcello Briggs is a 48 y.o. male on warfarin therapy for congenital heart disease c/b sinus node dysfunction (s/p PPM) and arrhythmias (Atrial fibrillation, atrial flutter, IART, NS-VT). PharmD has been consulted for warfarin dosing.      Home dose: Warfarin 8 mg daily    Coumadin clinic enrollment: Not enrolled, does not follow-up for regular INR labs    INR goal: 2-3    Lab Results   Component Value Date    INR 1.3 (H) 11/29/2023    INR 1.2 11/28/2023    INR 1.3 (H) 09/07/2023       Drug Interactions: digoxin (home medication)      Assessment/Plan    S/p cardiac cath 11/29. INR 1.3.    Spoke to patient today in room. He informed me he does not regularly follow-up for INR labs due to the distance from his house to the nearest lab facility. We have very infrequent INR readings in our system. Relayed this information to Dr. Lechuga and Dr. Boykin, both of which okay switching to apixaban. Spoke to patient, he is agreeable to switching to apixaban. Apixaban prescription has been sent to our pharmacy, we will bedside deliver the first month with $0 copay using coupon card.     Stop warfarin and enoxaparin  Start apixaban 5 mg BID, first dose tonight  Ok to stop PT/INR labs unless otherwise needed    Thank you for the consult, will sign off    Chrissie HuD., BCPS  [id number]      **Note: Consults are reviewed Monday-Friday 7:00am-3:30pm. The above recommendations are only suggested. The recommendations should be considered in conjunction with all patient factors.**

## 2023-11-29 NOTE — PLAN OF CARE
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]  Adult congenital heart disease [Q24.9]  Ventricular arrhythmia [I49.9]  VSD (ventricular septal defect and aortic arch hypoplasia [Q21.0, Q25.42]  S/P Fontan procedure [Z98.890]  Atypical atrial flutter [I48.4]    Admission Date: 11/28/2023  Expected Discharge Date:     Transition of Care Barriers: None    Payor: [employer] / Plan: [facility] CHOICE PLUS / Product Type: Commercial /     Extended Emergency Contact Information  Primary Emergency Contact: Jay,Summer  Address: [address] [address] [place]  Mobile Phone: [phone]  Relation: Sister  Secondary Emergency Contact: Robert Rodriguez  Mobile Phone: [phone]  Relation: Father    Discharge Plan A: Home with family  Discharge Plan B: Home      [id number] - [place] - [address] AT [facility] [address]  [address]  [facility] [address]  Phone: [phone] Fax: [phone]      Initial Assessment (most recent)       Adult Discharge Assessment - 11/29/23 1441          Discharge Assessment    Assessment Type Discharge Planning Assessment     Confirmed/corrected address, phone number and insurance Yes     Confirmed Demographics Correct on Facesheet     Source of Information patient     When was your last doctors appointment? --   1 month ago    Communicated KULDEEP with patient/caregiver Date not available/Unable to determine     Reason For Admission Tricuspid atresia     People in Home child(lobo), dependent     Do you expect to return to your current living situation? Yes     Do you have help at home or someone to help you manage your care at home? No     Prior to hospitilization cognitive status: Alert/Oriented     Current cognitive status: Alert/Oriented     Home Layout  Able to live on 1st floor     Equipment Currently Used at Home CPAP     Readmission within 30 days? No     Patient currently being followed by outpatient case management? No     Do you currently have service(s) that help you manage your care at home? No     Do you take prescription medications? Yes     Do you have prescription coverage? Yes     Coverage [employer] healthcare     Do you have any problems affording any of your prescribed medications? No     Is the patient taking medications as prescribed? yes     Who is going to help you get home at discharge? friend or family     How do you get to doctors appointments? family or friend will provide;car, drives self     Are you on dialysis? No     Do you take coumadin? Yes     Who monitors your labs? Dr. Dereje Acosta     DME Needed Upon Discharge  none     Discharge Plan discussed with: Patient     Transition of Care Barriers None     Discharge Plan A Home with family     Discharge Plan B Home        Physical Activity    On average, how many days per week do you engage in moderate to strenuous exercise (like a brisk walk)? 0 days     On average, how many minutes do you engage in exercise at this level? 0 min        Financial Resource Strain    How hard is it for you to pay for the very basics like food, housing, medical care, and heating? Not hard at all        Housing Stability    In the last 12 months, was there a time when you were not able to pay the mortgage or rent on time? No     In the last 12 months, was there a time when you did not have a steady place to sleep or slept in a shelter (including now)? No        Transportation Needs    In the past 12 months, has lack of transportation kept you from medical appointments or from getting medications? No     In the past 12 months, has lack of transportation kept you from meetings, work, or from getting things needed for daily living? No        Food Insecurity    Within the past 12 months, you worried that your food  would run out before you got the money to buy more. Never true     Within the past 12 months, the food you bought just didn't last and you didn't have money to get more. Never true        Stress    Do you feel stress - tense, restless, nervous, or anxious, or unable to sleep at night because your mind is troubled all the time - these days? Not at all        Social Connections    In a typical week, how many times do you talk on the phone with family, friends, or neighbors? More than three times a week     How often do you get together with friends or relatives? Three times a week     How often do you attend Jew or Orthodoxy services? More than 4 times per year     Do you belong to any clubs or organizations such as Jew groups, unions, fraternal or athletic groups, or school groups? --   Venancio Instructor    How often do you attend meetings of the clubs or organizations you belong to? 1 to 4 times per year     Are you , , , , never , or living with a partner?         Alcohol Use    Q1: How often do you have a drink containing alcohol? Monthly or less     Q2: How many drinks containing alcohol do you have on a typical day when you are drinking? 1 or 2     Q3: How often do you have six or more drinks on one occasion? Never                      SW met with Pt to discuss discharge planning.  Pt lives with two children in a house and independent with ADLs.  Pt will have transportation and assistance from cousin/family member at discharge.  Discharge Plan A and Plan B have been determined by review of patient's clinical status, future medical and therapeutic needs, and coverage/benefits for post-acute care in coordination with multidisciplinary team members.       Pam Quintanilla MSW, CSW

## 2023-11-29 NOTE — PROGRESS NOTES
[facility] - Cardiology [facility] Medicine  Progress Note    Patient Name: Marcello Briggs  MRN: [id number]  Patient Class: IP- Inpatient   Admission Date: 11/28/2023  Length of Stay: 1 days  Attending Physician: Isael Lechuga MD  Primary Care Provider: Kevin Chaves MD        Subjective:     Principal Problem:S/P Fontan procedure        HPI:  Mr. Marcello Briggs is a 48 y.o. 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 Pt underwent a combined right and left retrograde heart cath, and was initially found to be in A flutter which was address w/ intracardiac echo and DCCV w/ cardiology. Pt is in recovery following the procedures and is requiring PO sotalol initiation. Pt reports mild groin pain and otherwise has no new complaints.    Overview/Hospital Course:  No notes on file    Interval History:  No complaints or acute events overnight.  Blood pressure and pulse stable.  INR of 1.3 this morning    Review of Systems   All other systems reviewed and are negative.    Objective:     Vital Signs (Most Recent):  Temp: 99.1 °F (37.3 °C) (11/29/23 0737)  Pulse: 60 (11/29/23 0737)  Resp: 20 (11/29/23 0737)  BP: 136/63 (11/29/23 0737)  SpO2: (!) 88 % (11/29/23 0737) Vital Signs (24h Range):  Temp:  [97.8 °F (36.6 °C)-99.1 °F (37.3 °C)] 99.1 °F (37.3 °C)  Pulse:  [54-66] 60  Resp:  [14-26] 20  SpO2:  [85 %-91 %] 88 %  BP: (100-146)/(51-68) 136/63     Weight: 70.2 kg (154 lb 12.2 oz)  Body mass index is 27.42 kg/m².    Intake/Output Summary (Last 24 hours) at 11/29/2023 0847  Last data filed at 11/28/2023 1700  Gross per 24 hour   Intake 450 ml   Output --   Net 450 ml         Physical Exam  Constitutional:       General: He is not in acute distress.  HENT:      Head: Normocephalic.      Right Ear: External ear normal.      Left Ear: External ear normal.      Nose: Nose normal.    Eyes:      General: No scleral icterus.  Cardiovascular:      Rate and Rhythm: Normal rate.      Heart sounds: Murmur heard.   Pulmonary:      Breath sounds: Normal breath sounds. No wheezing or rales.   Abdominal:      Palpations: Abdomen is soft.      Tenderness: There is no abdominal tenderness.   Musculoskeletal:      Right lower leg: No edema.      Left lower leg: No edema.   Skin:     General: Skin is warm.   Neurological:      General: No focal deficit present.      Mental Status: He is alert and oriented to person, place, and time.   Psychiatric:         Mood and Affect: Mood normal.         Thought Content: Thought content normal.             Significant Labs: All pertinent labs within the past 24 hours have been reviewed.      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 Pt is in recovery following the procedures and is requiring PO sotalol initiation.    - AFVSS, no leukocytosis or electrolyte abnormalities  - start sotalol 80 mg bid po  - resume previous medications: Mtp succinate 300 mg qd, digoxin 0.25 mg qam, formulary lisnopril 20 mg qd, lasix 20 mg qd  - ECG stat prn  - K>4, Mg >2    Follow-up:   · 1 and 3 months remote transmission  · Agree with Dr. Acosta that catheterization would be useful  · At minimum 6 months clinic interrogation with ECG, echocardiogram, and 24hr heart rhythm monitor    11/29/23  Continue Sotalol 80 mg twice daily.  Discussed with Cardiology, patient to receive last dose of Sotalol on Thursday evening (November 30, 2023) and can be discharged afterwards    Long term current use of anticoagulant therapy  Cardiac pacemaker in situ  This patient has long term use on an anticoagulant with Select Anticoagulant(s): Warfarin/Coumadin. Their  long term anticoagulation will be Held or Continued: continued. They are on long term anticoagulation due to Reason for Anticoagulation: Atrial fibrillation and DVT/PE.     - lovenox bridge for INR 1.2  - daily INR  - pharm consult    11/29/23  INR 1.3 this morning.  Continue warfarin and bridging with Lovenox      VTE Risk Mitigation (From admission, onward)           Ordered     enoxaparin injection 70 mg  Every 12 hours         11/28/23 1454     warfarin split tablet 8 mg  Daily        [facility].    11/28/23 1438     IP VTE HIGH RISK PATIENT  Once         11/28/23 1408     Place sequential compression device  Until discontinued         11/28/23 1408                    Discharge Planning   KULDEEP:      Code Status: Full Code   Is the patient medically ready for discharge?:     Reason for patient still in hospital (select all that apply): Patient trending condition                     Isael Lechuga MD  Department of Hospital Medicine   [facility] - Cardiology Stepdown

## 2023-11-29 NOTE — ANESTHESIA PREPROCEDURE EVALUATION
11/28/2023  Marcello Briggs is a 48 y.o., male.      Pre-op Assessment    I have reviewed the Patient Summary Reports.     I have reviewed the Nursing Notes. I have reviewed the NPO Status.   I have reviewed the Medications.     Review of Systems  Anesthesia Hx:  No previous Anesthesia   Neg history of prior surgery.          Denies Family Hx of Anesthesia complications.    Denies Personal Hx of Anesthesia complications.                    Social:  Non-Smoker, No Alcohol Use       Hematology/Oncology:  Hematology Normal   Oncology Normal                                   EENT/Dental:  EENT/Dental Normal           Cardiovascular:  Cardiovascular Normal Exercise tolerance: good                 ECG has been reviewed. tricuspid atresia, d-TGA, VSD and RV hypoplasia S/P DKS and lateral tunnel Fontan.                           Pulmonary:  Pulmonary Normal                       Renal/:  Renal/ Normal                 Hepatic/GI:  Hepatic/GI Normal                 Musculoskeletal:  Musculoskeletal Normal                Neurological:  Neurology Normal                                      Endocrine:  Endocrine Normal            Dermatological:  Skin Normal    Psych:  Psychiatric Normal                  Physical Exam  General: Well nourished, Cooperative, Alert and Oriented    Airway:  Mallampati: I / I  Mouth Opening: Normal  TM Distance: Normal  Tongue: Normal  Neck ROM: Normal ROM    Dental:  Intact    Anesthesia Plan  Type of Anesthesia, risks & benefits discussed:    Anesthesia Type: Gen Natural Airway, MAC  Intra-op Monitoring Plan: Standard ASA Monitors  Post Op Pain Control Plan: multimodal analgesia and IV/PO Opioids PRN  Induction:  IV  Informed Consent: Informed consent signed with the Patient and all parties understand the risks and agree with anesthesia plan.  All questions answered. Patient consented to  blood products? Yes  ASA Score: 3  Day of Surgery Review of History & Physical: H&P Update referred to the surgeon/provider.    Ready For Surgery From Anesthesia Perspective.   .

## 2023-11-29 NOTE — PROGRESS NOTES
Hospital Medicine Pharmacy Consult Note    Pharmacy to review dose of warfarin    Marcello Briggs is a 48 y.o. male on warfarin therapy for congenital heart disease c/b sinus node dysfunction (s/p PPM) and arrhythmias (Atrial fibrillation, atrial flutter, IART, NS-VT). PharmD has been consulted for warfarin dosing.      Home dose: Warfarin 8 mg daily    Coumadin clinic enrollment: Not enrolled, does not follow-up for regular INR labs    INR goal: 2-3    Lab Results   Component Value Date    INR 1.3 (H) 11/29/2023    INR 1.2 11/28/2023    INR 1.3 (H) 09/07/2023       Drug Interactions: digoxin (home medication)      Assessment/Plan    S/p cardiac cath 11/29. INR 1.3.    Spoke to patient today in room. He informed me he does not regularly follow-up for INR labs due to the distance from his house to the nearest lab facility. We have very infrequent INR readings in our system.    Continue warfarin 8 mg daily  Continue enoxaparin 1 mg/kg q12h until INR > 2  PT/INR lab daily while inpatient  Touch base with Myron Acosta/Trevon - appears there were possible plans to switch to DOAC after cath (see telephone encounter 11/10/23) - this would be a better option for patient if his cardiologists are amendable and if affordable given he does not routinely follow-up for INR labs    Thank you for the consult, will continue to follow    Sukh Giordano, Pharm.D., BCPS  [id number]      **Note: Consults are reviewed Monday-Friday 7:00am-3:30pm. The above recommendations are only suggested. The recommendations should be considered in conjunction with all patient factors.**

## 2023-11-30 LAB
ANION GAP SERPL CALC-SCNC: 8 MMOL/L (ref 8–16)
BASOPHILS # BLD AUTO: 0.03 K/UL (ref 0–0.2)
BASOPHILS NFR BLD: 0.6 % (ref 0–1.9)
BUN SERPL-MCNC: 16 MG/DL (ref 6–20)
CALCIUM SERPL-MCNC: 8.8 MG/DL (ref 8.7–10.5)
CHLORIDE SERPL-SCNC: 105 MMOL/L (ref 95–110)
CO2 SERPL-SCNC: 24 MMOL/L (ref 23–29)
CREAT SERPL-MCNC: 0.9 MG/DL (ref 0.5–1.4)
DIFFERENTIAL METHOD: ABNORMAL
EOSINOPHIL # BLD AUTO: 0.2 K/UL (ref 0–0.5)
EOSINOPHIL NFR BLD: 3.5 % (ref 0–8)
ERYTHROCYTE [DISTWIDTH] IN BLOOD BY AUTOMATED COUNT: 13.5 % (ref 11.5–14.5)
EST. GFR  (NO RACE VARIABLE): >60 ML/MIN/1.73 M^2
GLUCOSE SERPL-MCNC: 77 MG/DL (ref 70–110)
HCT VFR BLD AUTO: 46.6 % (ref 40–54)
HGB BLD-MCNC: 15.9 G/DL (ref 14–18)
IMM GRANULOCYTES # BLD AUTO: 0.02 K/UL (ref 0–0.04)
IMM GRANULOCYTES NFR BLD AUTO: 0.4 % (ref 0–0.5)
LYMPHOCYTES # BLD AUTO: 0.8 K/UL (ref 1–4.8)
LYMPHOCYTES NFR BLD: 16.6 % (ref 18–48)
MAGNESIUM SERPL-MCNC: 1.8 MG/DL (ref 1.6–2.6)
MCH RBC QN AUTO: 32.1 PG (ref 27–31)
MCHC RBC AUTO-ENTMCNC: 34.1 G/DL (ref 32–36)
MCV RBC AUTO: 94 FL (ref 82–98)
MONOCYTES # BLD AUTO: 0.6 K/UL (ref 0.3–1)
MONOCYTES NFR BLD: 13.2 % (ref 4–15)
NEUTROPHILS # BLD AUTO: 3 K/UL (ref 1.8–7.7)
NEUTROPHILS NFR BLD: 65.7 % (ref 38–73)
NRBC BLD-RTO: 0 /100 WBC
PLATELET # BLD AUTO: 83 K/UL (ref 150–450)
PMV BLD AUTO: 10.3 FL (ref 9.2–12.9)
POTASSIUM SERPL-SCNC: 4.1 MMOL/L (ref 3.5–5.1)
RBC # BLD AUTO: 4.95 M/UL (ref 4.6–6.2)
SODIUM SERPL-SCNC: 137 MMOL/L (ref 136–145)
WBC # BLD AUTO: 4.63 K/UL (ref 3.9–12.7)

## 2023-11-30 PROCEDURE — A4216 STERILE WATER/SALINE, 10 ML: HCPCS | Performed by: PHYSICIAN ASSISTANT

## 2023-11-30 PROCEDURE — 93010 EKG 12-LEAD: ICD-10-PCS | Mod: ,,, | Performed by: PEDIATRICS

## 2023-11-30 PROCEDURE — 25000003 PHARM REV CODE 250: Performed by: PHYSICIAN ASSISTANT

## 2023-11-30 PROCEDURE — 20600001 HC STEP DOWN PRIVATE ROOM

## 2023-11-30 PROCEDURE — 36415 COLL VENOUS BLD VENIPUNCTURE: CPT | Performed by: PHYSICIAN ASSISTANT

## 2023-11-30 PROCEDURE — 80048 BASIC METABOLIC PNL TOTAL CA: CPT | Performed by: PHYSICIAN ASSISTANT

## 2023-11-30 PROCEDURE — 93010 ELECTROCARDIOGRAM REPORT: CPT | Mod: ,,, | Performed by: INTERNAL MEDICINE

## 2023-11-30 PROCEDURE — 93010 EKG 12-LEAD: ICD-10-PCS | Mod: ,,, | Performed by: INTERNAL MEDICINE

## 2023-11-30 PROCEDURE — 93010 ELECTROCARDIOGRAM REPORT: CPT | Mod: ,,, | Performed by: PEDIATRICS

## 2023-11-30 PROCEDURE — 83735 ASSAY OF MAGNESIUM: CPT | Performed by: PHYSICIAN ASSISTANT

## 2023-11-30 PROCEDURE — 25000003 PHARM REV CODE 250: Performed by: INTERNAL MEDICINE

## 2023-11-30 PROCEDURE — 85025 COMPLETE CBC W/AUTO DIFF WBC: CPT | Performed by: PHYSICIAN ASSISTANT

## 2023-11-30 PROCEDURE — 93005 ELECTROCARDIOGRAM TRACING: CPT

## 2023-11-30 RX ADMIN — APIXABAN 5 MG: 5 TABLET, FILM COATED ORAL at 09:11

## 2023-11-30 RX ADMIN — Medication 10 ML: at 10:11

## 2023-11-30 RX ADMIN — SOTALOL HYDROCHLORIDE 80 MG: 80 TABLET ORAL at 09:11

## 2023-11-30 RX ADMIN — Medication 10 ML: at 06:11

## 2023-11-30 RX ADMIN — APIXABAN 5 MG: 5 TABLET, FILM COATED ORAL at 08:11

## 2023-11-30 RX ADMIN — SOTALOL HYDROCHLORIDE 80 MG: 80 TABLET ORAL at 08:11

## 2023-11-30 RX ADMIN — METOPROLOL SUCCINATE 300 MG: 100 TABLET, EXTENDED RELEASE ORAL at 09:11

## 2023-11-30 RX ADMIN — LISINOPRIL 20 MG: 20 TABLET ORAL at 09:11

## 2023-11-30 RX ADMIN — DIGOXIN 0.25 MG: 125 TABLET ORAL at 06:11

## 2023-12-01 VITALS
HEART RATE: 59 BPM | HEIGHT: 63 IN | OXYGEN SATURATION: 89 % | BODY MASS INDEX: 27.11 KG/M2 | SYSTOLIC BLOOD PRESSURE: 130 MMHG | DIASTOLIC BLOOD PRESSURE: 60 MMHG | RESPIRATION RATE: 18 BRPM | WEIGHT: 153 LBS | TEMPERATURE: 99 F

## 2023-12-01 LAB
ANION GAP SERPL CALC-SCNC: 7 MMOL/L (ref 8–16)
BASOPHILS # BLD AUTO: 0.04 K/UL (ref 0–0.2)
BASOPHILS NFR BLD: 0.8 % (ref 0–1.9)
BUN SERPL-MCNC: 17 MG/DL (ref 6–20)
CALCIUM SERPL-MCNC: 8.8 MG/DL (ref 8.7–10.5)
CHLORIDE SERPL-SCNC: 106 MMOL/L (ref 95–110)
CO2 SERPL-SCNC: 25 MMOL/L (ref 23–29)
CREAT SERPL-MCNC: 0.9 MG/DL (ref 0.5–1.4)
DIFFERENTIAL METHOD: ABNORMAL
EOSINOPHIL # BLD AUTO: 0.2 K/UL (ref 0–0.5)
EOSINOPHIL NFR BLD: 3.7 % (ref 0–8)
ERYTHROCYTE [DISTWIDTH] IN BLOOD BY AUTOMATED COUNT: 13.1 % (ref 11.5–14.5)
EST. GFR  (NO RACE VARIABLE): >60 ML/MIN/1.73 M^2
GLUCOSE SERPL-MCNC: 85 MG/DL (ref 70–110)
HCT VFR BLD AUTO: 47.9 % (ref 40–54)
HGB BLD-MCNC: 16.2 G/DL (ref 14–18)
IMM GRANULOCYTES # BLD AUTO: 0.02 K/UL (ref 0–0.04)
IMM GRANULOCYTES NFR BLD AUTO: 0.4 % (ref 0–0.5)
LYMPHOCYTES # BLD AUTO: 0.8 K/UL (ref 1–4.8)
LYMPHOCYTES NFR BLD: 16 % (ref 18–48)
MAGNESIUM SERPL-MCNC: 1.8 MG/DL (ref 1.6–2.6)
MCH RBC QN AUTO: 31.3 PG (ref 27–31)
MCHC RBC AUTO-ENTMCNC: 33.8 G/DL (ref 32–36)
MCV RBC AUTO: 93 FL (ref 82–98)
MONOCYTES # BLD AUTO: 0.7 K/UL (ref 0.3–1)
MONOCYTES NFR BLD: 13.3 % (ref 4–15)
NEUTROPHILS # BLD AUTO: 3.2 K/UL (ref 1.8–7.7)
NEUTROPHILS NFR BLD: 65.8 % (ref 38–73)
NRBC BLD-RTO: 0 /100 WBC
PLATELET # BLD AUTO: 86 K/UL (ref 150–450)
PMV BLD AUTO: 10.7 FL (ref 9.2–12.9)
POTASSIUM SERPL-SCNC: 4.3 MMOL/L (ref 3.5–5.1)
RBC # BLD AUTO: 5.18 M/UL (ref 4.6–6.2)
SODIUM SERPL-SCNC: 138 MMOL/L (ref 136–145)
WBC # BLD AUTO: 4.87 K/UL (ref 3.9–12.7)

## 2023-12-01 PROCEDURE — 25000003 PHARM REV CODE 250: Performed by: PHYSICIAN ASSISTANT

## 2023-12-01 PROCEDURE — 85025 COMPLETE CBC W/AUTO DIFF WBC: CPT | Performed by: PHYSICIAN ASSISTANT

## 2023-12-01 PROCEDURE — A4216 STERILE WATER/SALINE, 10 ML: HCPCS | Performed by: PHYSICIAN ASSISTANT

## 2023-12-01 PROCEDURE — 36415 COLL VENOUS BLD VENIPUNCTURE: CPT | Performed by: PHYSICIAN ASSISTANT

## 2023-12-01 PROCEDURE — 94761 N-INVAS EAR/PLS OXIMETRY MLT: CPT

## 2023-12-01 PROCEDURE — 93005 ELECTROCARDIOGRAM TRACING: CPT | Performed by: PEDIATRICS

## 2023-12-01 PROCEDURE — 80048 BASIC METABOLIC PNL TOTAL CA: CPT | Performed by: PHYSICIAN ASSISTANT

## 2023-12-01 PROCEDURE — 83735 ASSAY OF MAGNESIUM: CPT | Performed by: PHYSICIAN ASSISTANT

## 2023-12-01 PROCEDURE — 25000003 PHARM REV CODE 250: Performed by: INTERNAL MEDICINE

## 2023-12-01 RX ORDER — SOTALOL HYDROCHLORIDE 80 MG/1
80 TABLET ORAL 2 TIMES DAILY
Qty: 60 TABLET | Refills: 11 | Status: SHIPPED | OUTPATIENT
Start: 2023-12-01 | End: 2024-11-30

## 2023-12-01 RX ADMIN — SOTALOL HYDROCHLORIDE 80 MG: 80 TABLET ORAL at 09:12

## 2023-12-01 RX ADMIN — DIGOXIN 0.25 MG: 125 TABLET ORAL at 06:12

## 2023-12-01 RX ADMIN — METOPROLOL SUCCINATE 300 MG: 100 TABLET, EXTENDED RELEASE ORAL at 09:12

## 2023-12-01 RX ADMIN — APIXABAN 5 MG: 5 TABLET, FILM COATED ORAL at 09:12

## 2023-12-01 RX ADMIN — Medication 10 ML: at 06:12

## 2023-12-01 RX ADMIN — LISINOPRIL 20 MG: 20 TABLET ORAL at 09:12

## 2023-12-01 NOTE — PLAN OF CARE
CHW scheduled pcp       Follow up with Kevin Chaves MD  Monday Dec 4, 2023  @2:00pm [facility]  [phone]

## 2023-12-01 NOTE — PLAN OF CARE
12/01/23 1510   Post-Acute Status   Post-Acute Authorization Other   Coverage [employer]   Other Status No Post-Acute Service Needs   Discharge Delays None known at this time   Discharge Plan   Discharge Plan A Home with family   Discharge Plan B Home     D/c home with family.    No other discharge needs identified at this time.    Discharge Plan A and Plan B have been determined by review of patient's clinical status, future medical and therapeutic needs, and coverage/benefits for post-acute care in coordination with multidisciplinary team members.     Gem Bueno LMSW  Case Management [facility]  [id number]

## 2023-12-01 NOTE — DISCHARGE SUMMARY
[facility] - Cardiology [facility] Medicine  Discharge Summary      Patient Name: Marcello Briggs  MRN: [id number]  RANI: [id number]  Patient Class: IP- Inpatient  Admission Date: 11/28/2023  Hospital Length of Stay: 3 days  Discharge Date and Time:  12/01/2023 1:13 PM  Attending Physician: Lindsay Samson MD   Discharging Provider: Lindsay Samson MD  Primary Care Provider: Kevin Chaves MD  Hospital Medicine Team: [facility]  Lindsay Samson MD  Primary Care Team: [facility]    HPI:   Mr. Marcello Briggs is a 48 y.o. 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 Pt underwent a combined right and left retrograde heart cath, and was initially found to be in A flutter which was address w/ intracardiac echo and DCCV w/ cardiology. Pt is in recovery following the procedures and is requiring PO sotalol initiation. Pt reports mild groin pain and otherwise has no new complaints.    Procedure(s) (LRB):  Catheterization, Heart, Combined Right and Retrograde Left, for Congenital Heart Defect (N/A)  Angiogram, Pulmonary, Pediatric  Aortogram, Pediatric  Venogram, Cath Lab  ICE, Performed      Hospital Course:   Patient was started on sotalol load per peds cards recommendations- last dose of sotalol 12/01 AM. ECGs reviewed- ok for patient to continue w/sotalol 80mg PO BID in addition to beta blocker and other home meds. Patient deemed stable for discharge 12/01.     Pt deemed appropriate for discharge. Plan discussed with pt, who was agreeable and amenable; medications were discussed and reviewed, outpatient follow-up scheduled, ER precautions were given, all questions were answered to the pt's satisfaction, and Mr. Briggs was subsequently discharged.    Physical Exam  Gen: in NAD, appears stated age  Neuro: AAOx3, motor, sensory, and strength grossly intact  BL  HEENT: NTNC, EOMI, PERRL, MMM  CVS: RRR, sys murmur, S1/S2 auscultated with no S3 or S4; capillary refill < 2 sec  Resp: lungs CTAB, no w/r/r; no belabored breathing or accessory muscle use appreciated   Abd: BS+ in all 4 quadrants; NTND, soft to palpation; no organomegaly appreciated   Extrem: pulses full, equal, and regular over all 4 extremities; no UE or LE edema BL, clubbing of fingernails  Skin: discoloration of BL LE (hyperpigmentation-chronic)          Goals of Care Treatment Preferences:  Code Status: Full Code      Consults:   Consults (From admission, onward)          Status Ordering Provider     Hospital Medicine PharmD Consult  Once        Provider:  (Not yet assigned)    KEHINDE Marie            No new Assessment & Plan notes have been filed under this hospital service since the last note was generated.  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    Specialty: Pediatric Cardiology  Contact information:  [address]  [place] [address]  [phone]               Kevin Chaves MD Follow up on 12/4/2023.    Specialty: Pediatrics  Why: @2:00pm  Contact information:  [address]  [address]  [place] [address] [phone]                           Patient Instructions:      Diet Cardiac     Notify your health care provider if you experience any of the following:  severe  "uncontrolled pain     Notify your health care provider if you experience any of the following:  persistent dizziness, light-headedness, or visual disturbances     Notify your health care provider if you experience any of the following:  difficulty breathing or increased cough     Activity as tolerated       Significant Diagnostic Studies: Labs: CMP   Recent Labs   Lab 11/30/23 0451 12/01/23 0426    138   K 4.1 4.3    106   CO2 24 25   GLU 77 85   BUN 16 17   CREATININE 0.9 0.9   CALCIUM 8.8 8.8   ANIONGAP 8 7*   , CBC   Recent Labs   Lab 11/30/23 0451 12/01/23 0426   WBC 4.63 4.87   HGB 15.9 16.2   HCT 46.6 47.9   PLT 83* 86*   , INR   Lab Results   Component Value Date    INR 1.3 (H) 11/29/2023    INR 1.2 11/28/2023    INR 1.3 (H) 09/07/2023   , Troponin No results for input(s): "TROPONINI" in the last 168 hours., and All labs within the past 24 hours have been reviewed    Pending Diagnostic Studies:       None           Medications:  Reconciled Home Medications:      Medication List        START taking these medications      ELIQUIS 5 mg Tab  Generic drug: apixaban  Take 1 tablet (5 mg total) by mouth 2 (two) times daily.            CHANGE how you take these medications      sotaloL 80 MG tablet  Commonly known as: BETAPACE  Take 1 tablet (80 mg total) by mouth 2 (two) times daily.  What changed:   medication strength  how much to take            CONTINUE taking these medications      digoxin 250 mcg tablet  Commonly known as: LANOXIN  Take 1 tablet (0.25 mg total) by mouth every morning.     enalapril 10 MG tablet  Commonly known as: VASOTEC  Take 1 tablet (10 mg total) by mouth 2 (two) times daily.     furosemide 20 MG tablet  Commonly known as: LASIX  Take 1 tablet (20 mg total) by mouth once daily.     metoprolol succinate 100 MG 24 hr tablet  Commonly known as: TOPROL-XL  Take 3 tablets (300 mg total) by mouth once daily.     sildenafil 20 mg Tab  Commonly known as: REVATIO  Take 1 tablet (20 " mg total) by mouth 2 (two) times daily.            STOP taking these medications      warfarin 2 MG tablet  Commonly known as: COUMADIN     warfarin 6 MG tablet  Commonly known as: COUMADIN              Indwelling Lines/Drains at time of discharge:   Lines/Drains/Airways       None                   Time spent on the discharge of patient: 35 minutes             Lindsay Samson MD  Department of Hospital Medicine  [facility] - Cardiology Stepdown

## 2023-12-01 NOTE — RESPIRATORY THERAPY
RAPID RESPONSE RESPIRATORY CHART CHECK       Chart check completed. Please call [phone] for further concerns or assistance.

## 2023-12-01 NOTE — HOSPITAL COURSE
Patient was started on sotalol load per peds cards recommendations- last dose of sotalol 12/01 AM. ECGs reviewed- ok for patient to continue w/sotalol 80mg PO BID in addition to beta blocker and other home meds. Patient deemed stable for discharge 12/01.     Pt deemed appropriate for discharge. Plan discussed with pt, who was agreeable and amenable; medications were discussed and reviewed, outpatient follow-up scheduled, ER precautions were given, all questions were answered to the pt's satisfaction, and Mr. Briggs was subsequently discharged.    Physical Exam  Gen: in NAD, appears stated age  Neuro: AAOx3, motor, sensory, and strength grossly intact BL  HEENT: NTNC, EOMI, PERRL, MMM  CVS: RRR, sys murmur, S1/S2 auscultated with no S3 or S4; capillary refill < 2 sec  Resp: lungs CTAB, no w/r/r; no belabored breathing or accessory muscle use appreciated   Abd: BS+ in all 4 quadrants; NTND, soft to palpation; no organomegaly appreciated   Extrem: pulses full, equal, and regular over all 4 extremities; no UE or LE edema BL, clubbing of fingernails  Skin: discoloration of BL LE (hyperpigmentation-chronic)

## 2023-12-01 NOTE — PROGRESS NOTES
[facility] - Cardiology [facility] Medicine  Progress Note    Patient Name: Marcello Briggs  MRN: [id number]  Patient Class: IP- Inpatient   Admission Date: 11/28/2023  Length of Stay: 2 days  Attending Physician: Lindsay Samson MD  Primary Care Provider: Kevin Chaves MD        Subjective:     Principal Problem:S/P Fontan procedure        HPI:  Mr. Marcello Briggs is a 48 y.o. 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 Pt underwent a combined right and left retrograde heart cath, and was initially found to be in A flutter which was address w/ intracardiac echo and DCCV w/ cardiology. Pt is in recovery following the procedures and is requiring PO sotalol initiation. Pt reports mild groin pain and otherwise has no new complaints.    Overview/Hospital Course:  Patient was started on sotalol load per peds cards recommendations- last dose of sotalol planned for 12/01 AM.     Interval History: No complaints this am- no chest pain, sob, nausea, vomiting, fevers, chills, night sweats, abd pain, diarrhea, constipation.     Objective:     Vital Signs (Most Recent):  Temp: 97.9 °F (36.6 °C) (11/30/23 1728)  Pulse: 60 (11/30/23 1728)  Resp: 18 (11/30/23 1728)  BP: 123/60 (11/30/23 1728)  SpO2: (!) 90 % (11/30/23 1728) Vital Signs (24h Range):  Temp:  [97.3 °F (36.3 °C)-98.4 °F (36.9 °C)] 97.9 °F (36.6 °C)  Pulse:  [59-70] 60  Resp:  [18] 18  SpO2:  [89 %-92 %] 90 %  BP: ()/(52-60) 123/60     Weight: 69.4 kg (153 lb)  Body mass index is 27.1 kg/m².    Intake/Output Summary (Last 24 hours) at 11/30/2023 1926  Last data filed at 11/30/2023 1645  Gross per 24 hour   Intake 942 ml   Output 1100 ml   Net -158 ml      Physical Exam  Gen: in NAD, appears stated age  Neuro: AAOx3, motor, sensory, and strength grossly intact BL  HEENT: NTNC, EOMI, PERRL, MMM  CVS: RRR, sys  "murmur, S1/S2 auscultated with no S3 or S4; capillary refill < 2 sec  Resp: lungs CTAB, no w/r/r; no belabored breathing or accessory muscle use appreciated   Abd: BS+ in all 4 quadrants; NTND, soft to palpation; no organomegaly appreciated   Extrem: pulses full, equal, and regular over all 4 extremities; no UE or LE edema BL    Significant Labs: All pertinent labs within the past 24 hours have been reviewed.  CBC:   Recent Labs   Lab 11/29/23 0621 11/30/23 0451   WBC 5.02 4.63   HGB 15.6 15.9   HCT 47.3 46.6   PLT 79* 83*     CMP:   Recent Labs   Lab 11/29/23 0621 11/30/23 0451    137   K 4.4 4.1    105   CO2 24 24   GLU 79 77   BUN 15 16   CREATININE 0.9 0.9   CALCIUM 8.6* 8.8   ANIONGAP 12 8     Cardiac Markers: No results for input(s): "CKMB", "MYOGLOBIN", "BNP", "TROPISTAT" in the last 48 hours.    Significant Imaging: I have reviewed all pertinent imaging results/findings within the past 24 hours.    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 Pt is in recovery following the procedures and is requiring PO sotalol initiation.    - AFVSS, no leukocytosis or electrolyte abnormalities  - continue sotalol 80 mg BID PO- last dose of sotalol AM of 12/01  - continue previous medications: Mtp succinate 300 mg qd, digoxin 0.25 mg qam, formulary lisnopril 20 mg qd, lasix 20 mg qd  - ECG stat prn  - K>4, Mg >2    Follow-up:   · 1 and 3 months remote transmission  · Agree with Dr. Acosta that catheterization would be useful  · At minimum 6 months clinic interrogation with ECG, echocardiogram, and 24hr heart rhythm monitor    Atypical atrial flutter  - patient w/known history of aflutter  - known patient of Dr. Boykin  - Admitted to [facility] for sotalol load  - Obtain ECG 2 " hours after each dose of sotalol  - EP will need to be notified of ECG's completed in order to review Qtc prior to each dose  - Tele  - K>4, Mg>2        Long term current use of anticoagulant therapy  Cardiac pacemaker in situ  This patient has long term use on an anticoagulant with [facility] Anticoagulant(s): Warfarin/Coumadin. Their long term anticoagulation will be Held or Continued: continued. They are on long term anticoagulation due to Reason for Anticoagulation: Atrial fibrillation and DVT/PE.     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  - h/o         VTE Risk Mitigation (From admission, onward)           Ordered     apixaban tablet 5 mg  2 times daily         11/29/23 1312     IP VTE HIGH RISK PATIENT  Once         11/28/23 1408     Place sequential compression device  Until discontinued         11/28/23 1408                    Discharge Planning   KULDEEP: 12/1/2023     Code Status: Full Code   Is the patient medically ready for discharge?: No    Reason for patient still in hospital (select all that apply): Patient trending condition  Discharge Plan A: Home with family                    Lindsay Samson MD  Department of Hospital Medicine   [facility] - Cardiology Stepdown

## 2023-12-01 NOTE — SUBJECTIVE & OBJECTIVE
"Interval History: No complaints this am- no chest pain, sob, nausea, vomiting, fevers, chills, night sweats, abd pain, diarrhea, constipation.     Objective:     Vital Signs (Most Recent):  Temp: 97.9 °F (36.6 °C) (11/30/23 1728)  Pulse: 60 (11/30/23 1728)  Resp: 18 (11/30/23 1728)  BP: 123/60 (11/30/23 1728)  SpO2: (!) 90 % (11/30/23 1728) Vital Signs (24h Range):  Temp:  [97.3 °F (36.3 °C)-98.4 °F (36.9 °C)] 97.9 °F (36.6 °C)  Pulse:  [59-70] 60  Resp:  [18] 18  SpO2:  [89 %-92 %] 90 %  BP: ()/(52-60) 123/60     Weight: 69.4 kg (153 lb)  Body mass index is 27.1 kg/m².    Intake/Output Summary (Last 24 hours) at 11/30/2023 1926  Last data filed at 11/30/2023 1645  Gross per 24 hour   Intake 942 ml   Output 1100 ml   Net -158 ml      Physical Exam  Gen: in NAD, appears stated age  Neuro: AAOx3, motor, sensory, and strength grossly intact BL  HEENT: NTNC, EOMI, PERRL, MMM  CVS: RRR, sys murmur, S1/S2 auscultated with no S3 or S4; capillary refill < 2 sec  Resp: lungs CTAB, no w/r/r; no belabored breathing or accessory muscle use appreciated   Abd: BS+ in all 4 quadrants; NTND, soft to palpation; no organomegaly appreciated   Extrem: pulses full, equal, and regular over all 4 extremities; no UE or LE edema BL    Significant Labs: All pertinent labs within the past 24 hours have been reviewed.  CBC:   Recent Labs   Lab 11/29/23 0621 11/30/23 0451   WBC 5.02 4.63   HGB 15.6 15.9   HCT 47.3 46.6   PLT 79* 83*     CMP:   Recent Labs   Lab 11/29/23 0621 11/30/23 0451    137   K 4.4 4.1    105   CO2 24 24   GLU 79 77   BUN 15 16   CREATININE 0.9 0.9   CALCIUM 8.6* 8.8   ANIONGAP 12 8     Cardiac Markers: No results for input(s): "CKMB", "MYOGLOBIN", "BNP", "TROPISTAT" in the last 48 hours.    Significant Imaging: I have reviewed all pertinent imaging results/findings within the past 24 hours.  "

## 2023-12-02 LAB
BLD PROD TYP BPU: NORMAL
BLD PROD TYP BPU: NORMAL
BLOOD UNIT EXPIRATION DATE: NORMAL
BLOOD UNIT EXPIRATION DATE: NORMAL
BLOOD UNIT TYPE CODE: 5100
BLOOD UNIT TYPE CODE: 5100
BLOOD UNIT TYPE: NORMAL
BLOOD UNIT TYPE: NORMAL
CODING SYSTEM: NORMAL
CODING SYSTEM: NORMAL
CROSSMATCH INTERPRETATION: NORMAL
CROSSMATCH INTERPRETATION: NORMAL
DISPENSE STATUS: NORMAL
DISPENSE STATUS: NORMAL
NUM UNITS TRANS PACKED RBC: NORMAL
NUM UNITS TRANS PACKED RBC: NORMAL

## 2023-12-04 RX ORDER — DIGOXIN 250 MCG
TABLET ORAL
Qty: 30 TABLET | Refills: 3 | Status: SHIPPED | OUTPATIENT
Start: 2023-12-04

## 2023-12-06 ENCOUNTER — PATIENT MESSAGE (OUTPATIENT)
Dept: PEDIATRIC CARDIOLOGY | Facility: CLINIC | Age: 48
End: 2023-12-06

## 2023-12-08 ENCOUNTER — PATIENT MESSAGE (OUTPATIENT)
Dept: PEDIATRIC CARDIOLOGY | Facility: CLINIC | Age: 48
End: 2023-12-08

## 2023-12-11 DIAGNOSIS — Z95.0 PACEMAKER: Primary | ICD-10-CM

## 2023-12-11 DIAGNOSIS — I48.4 ATYPICAL ATRIAL FLUTTER: ICD-10-CM

## 2023-12-20 ENCOUNTER — PATIENT MESSAGE (OUTPATIENT)
Dept: CARDIOLOGY | Facility: CLINIC | Age: 48
End: 2023-12-20

## 2023-12-20 DIAGNOSIS — Q25.42 VSD (VENTRICULAR SEPTAL DEFECT AND AORTIC ARCH HYPOPLASIA: ICD-10-CM

## 2023-12-20 DIAGNOSIS — Z95.0 PACEMAKER: Primary | ICD-10-CM

## 2023-12-20 DIAGNOSIS — Q21.0 VSD (VENTRICULAR SEPTAL DEFECT AND AORTIC ARCH HYPOPLASIA: ICD-10-CM

## 2023-12-20 DIAGNOSIS — Z98.890 S/P FONTAN PROCEDURE: ICD-10-CM

## 2023-12-20 DIAGNOSIS — Q24.9 ADULT CONGENITAL HEART DISEASE: ICD-10-CM

## 2023-12-20 DIAGNOSIS — I48.4 ATYPICAL ATRIAL FLUTTER: ICD-10-CM

## 2024-01-19 ENCOUNTER — PATIENT MESSAGE (OUTPATIENT)
Dept: CARDIOLOGY | Facility: CLINIC | Age: 49
End: 2024-01-19

## 2024-01-19 ENCOUNTER — PATIENT MESSAGE (OUTPATIENT)
Dept: PEDIATRIC CARDIOLOGY | Facility: CLINIC | Age: 49
End: 2024-01-19

## 2024-01-22 ENCOUNTER — HOSPITAL ENCOUNTER (OUTPATIENT)
Dept: PEDIATRIC CARDIOLOGY | Facility: HOSPITAL | Age: 49
Discharge: HOME | End: 2024-01-22
Attending: PEDIATRICS

## 2024-01-22 DIAGNOSIS — I49.5 SINUS NODE DYSFUNCTION: ICD-10-CM

## 2024-01-22 DIAGNOSIS — I48.92 ATRIAL FLUTTER, UNSPECIFIED TYPE: ICD-10-CM

## 2024-01-22 DIAGNOSIS — I48.4 ATYPICAL ATRIAL FLUTTER: ICD-10-CM

## 2024-01-22 DIAGNOSIS — I50.9 HEART FAILURE DUE TO CONGENITAL HEART DISEASE: ICD-10-CM

## 2024-01-22 DIAGNOSIS — Z98.890 S/P FONTAN PROCEDURE: ICD-10-CM

## 2024-01-22 DIAGNOSIS — I49.9 VENTRICULAR ARRHYTHMIA: ICD-10-CM

## 2024-01-22 DIAGNOSIS — Q20.3 TRICUSPID ATRESIA WITH D-TRANSPOSITION OF GREAT ARTERIES: ICD-10-CM

## 2024-01-22 DIAGNOSIS — Q24.9 ADULT CONGENITAL HEART DISEASE: ICD-10-CM

## 2024-01-22 DIAGNOSIS — Q24.9 HEART FAILURE DUE TO CONGENITAL HEART DISEASE: ICD-10-CM

## 2024-01-22 DIAGNOSIS — Q21.0 VSD (VENTRICULAR SEPTAL DEFECT AND AORTIC ARCH HYPOPLASIA: ICD-10-CM

## 2024-01-22 DIAGNOSIS — Z95.0 PACEMAKER: ICD-10-CM

## 2024-01-22 DIAGNOSIS — Q22.4 TRICUSPID ATRESIA WITH D-TRANSPOSITION OF GREAT ARTERIES: Primary | ICD-10-CM

## 2024-01-22 DIAGNOSIS — Q20.3 TRICUSPID ATRESIA WITH D-TRANSPOSITION OF GREAT ARTERIES: Primary | ICD-10-CM

## 2024-01-22 DIAGNOSIS — Q22.4 TRICUSPID ATRESIA WITH D-TRANSPOSITION OF GREAT ARTERIES: ICD-10-CM

## 2024-01-22 DIAGNOSIS — Q25.42 VSD (VENTRICULAR SEPTAL DEFECT AND AORTIC ARCH HYPOPLASIA: ICD-10-CM

## 2024-01-22 PROCEDURE — 93294 REM INTERROG EVL PM/LDLS PM: CPT | Mod: ,,, | Performed by: PEDIATRICS

## 2024-01-22 PROCEDURE — 93296 REM INTERROG EVL PM/IDS: CPT

## 2024-02-07 LAB
AV DELAY - LONGEST: 200 MSEC
BATTERY VOLTAGE (V): 2.95 V
IMPEDANCE RA LEAD (NATIVE): 310 OHMS
IMPEDANCE RA LEAD: 310 OHMS
OHS CV DC PP MS1: 1 MS
OHS CV DC PP MS2: 0.5 MS
OHS CV DC PP V1: 3 V
OHS CV DC PP V2: NORMAL V
P/R-WAVE RA LEAD (NATIVE): 7.2 MV
P/R-WAVE RA LEAD: 2.4 MV
PV DELAY - LONGEST: 200 MSEC

## 2024-02-07 NOTE — PROGRESS NOTES
Name: aMrcello Briggs  MRN: [id number]  : 1975      Subjective:   CC: Single Ventricular / Fontan Physiology    HPI:    Marcello Briggs is a 48 y.o. 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     At his last visit in September, He has a number of high rate episodes. He has CPAP for MIGUEL, but reports that he doesn't use it. He denies any palpitations. He hasn't taken his sotalol in several years. While I had not discontinued it, he reports being unsure if I should have been on the sotalol, but never asked. He also has not taken his metoprolol for at least 1 month (though I suspect longer). At last visit, we discussed scheduling a catheterization, but he hasn't done so yet. He was subsequently found to be in atrial flutter, and was cardioverted on 10/5/2023. His atrial sensitivity was decreased with this, and it appears to have been oversensing some lead noise, so we increased that today.   Since that last visit, he has felt overall somewhat crummy. He has occasional chest discomfort and sensation of irregular heart beat. More notably is he overall feels more fatigued. He denies near-syncope, syncope, fever, cough, congestion, increased swelling.      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  Cardiac/Surgical Hx  History of a classic Roger shunt and a 12 mm conduit from the right atrium to the left pulmonary artery along with a Damus Judy Stansel operation   now with mild native and brigette-aortic insufficiency  lateral tunnel Fontan with a 16 mm ring Goretex graft from the left pulmonary artery to the right pulmonary artery.  elevated central venous pressures with mild desaturation at least partially due to collateral vessels - improved after cath  but still  "an issue.  mild mitral regurgitation   Mildly decreased systemic ventricular function  Related:  Last liver US September 2020  No evidence of protein-losing enteropathy.  Recurrent intra-atrial reentrant tachycardia   arrhythmias typically manifest as "back pain"  Sinus node dysfunction   most recent pacemaker/ICD change 10/08/2020  Nonsustained VT  Noninvasive EP study (NIEPS) negative 2016  Varicose veins   followed in the past by vascular Medicine.    Improved pain and swelling.    Of note, the patient was accessed via the right femoral vein without difficulty at the 2010 catheterization.  Obstructive sleep apnea      Family Hx:  No known family history of congenital heart defects or cardiac surgeries in childhood.  No known family members with pacemakers or defibrillators.  No known inherited channelopathies or cardiomyopathies.  No known hx of sudden cardiac death or heart transplant.  No No known heart attack in someone less than 50yoa.    Social Hx:  Lives in [place], [place].    Review of Systems:  See HPI  ALL: See below.    Medications & Allergy:  Current Outpatient Medications on File Prior to Visit   Medication Sig Dispense Refill    apixaban (ELIQUIS) 5 mg Tab Take 1 tablet (5 mg total) by mouth 2 (two) times daily. 60 tablet 11    digoxin (LANOXIN) 250 mcg tablet TAKE 1 TABLET(0.25 MG) BY MOUTH EVERY MORNING 30 tablet 3    enalapril (VASOTEC) 10 MG tablet Take 1 tablet (10 mg total) by mouth 2 (two) times daily. 60 tablet 3    furosemide (LASIX) 20 MG tablet Take 1 tablet (20 mg total) by mouth once daily. (Patient taking differently: Take 20 mg by mouth once daily. Per pt medication is taken on an as needed basis.) 30 tablet 11    metoprolol succinate (TOPROL-XL) 100 MG 24 hr tablet Take 3 tablets (300 mg total) by mouth once daily. 270 tablet 3    sildenafil (REVATIO) 20 mg Tab Take 1 tablet (20 mg total) by mouth 2 (two) times daily. 60 tablet 3    sotaloL (BETAPACE) 80 MG tablet Take 1 tablet (80 mg " "total) by mouth 2 (two) times daily. 60 tablet 11     No current facility-administered medications on file prior to visit.       Review of patient's allergies indicates:  No Known Allergies       Objective:   Vitals:  Vitals:    02/08/24 1057 02/08/24 1058   BP: 133/62 (!) 148/66   BP Location: Right arm Left leg   Patient Position: Sitting Lying   Pulse: 63    SpO2: (!) 89%    Weight: 71.3 kg (157 lb 3 oz)    Height: 5' 2.99" (1.6 m)        Body mass index is 27.85 kg/m².  Body surface area is 1.78 meters squared.    Exam:  GEN: No acute distress, Normal appearing  EYE: Anicteric sclerae  ENT: No drainage, Moist mucous membranes  PULM: Normal work of breathing;  Clear to auscultation bilaterally, Good air movement throughout.  CV: No chest pain;   Single S2,   I/VI systolic murmur;   No rubs or gallops;  EXT: No cyanosis, Mild RLE edema. Significant varices noted (unchanged)   2+ radial and dorsalis pedis pulses bilaterally  ABD: Soft, Non-distended, Non-tender,    Liver edge 3cm below RCM;  Normal bowel sounds  DERM: No rashes  NEUR: Normal gait, Grossly normal tone.  PSY: Normal mood and affect      Results / Data:   ECG:   (11/02/2023) - Atrial paced rhythm, intraventricular conduction delay.  (09/07/2023) - AV sequentially paced rhythm and competing junctional rhythm.  (08/18/2022) - Atrial paced rhythm, intraventricular conduction delay.  (10/22/2020) - Atrial paced rhythm, intraventricular conduction delay.    Holter/Zio:  (02/08/2024)  - Pending, placed today.    (09/17/2023)  Atrial fibrillation noted.  Reports 100% atrial fibrillation but appears some extent is AP-. So normal rhythm is likely under-represented.  Ventricular HR range .  No patient-triggered events.  Occasional isolated PVCs (1.9%).    (08/18/2022)  Sinus rhythm or paced rhythm throughout.  Normal HR range.  No patient-triggered events.  No significant ectopy burden.    (09/18/2020)  Predominant rhythm is AV sequential pacing  Rare " atrial/ventricular ectopy  20 episodes of slow SVT (longest 9 beats fastest 133 bpm)  One 4 beat episode of slow VT (max rate 156 bpm) and one ventricular triplet  No diary symptoms    (9/18/20)  Predominant rhythm is AV sequential pacing  Rare atrial/ventricular ectopy  20 episodes of slow SVT (longest 9 beats fastest 133 bpm)  One 4 beat episode of slow VT (max rate 156 bpm) and one ventricular triplet  No diary symptoms    Echocardiogram:   (09/07/2023)   CONGENITAL CARDIAC HISTORY:  Tricuspid atresia, hypoplastic right ventricle with ventricular septal defect and transposition of the great arteries.  INTERVENTION:  S/P [id number] and lateral tunnel Fontan.  S/P Coil and Amplatzer occlusion of venovenous collaterals (detailed description of anatomy in cath report July 2010).     SEGMENTAL CARDIAC CONNECTIONS (previously demonstrated):  Abdominal situs is solitus.  Atrial situs is normal.  Tricuspid atresia.  Abnormal mitral valve with tricuspid atresia.  Very dilated, globular left ventricle with very hypoplastic right ventricle.  D-transposed great vessels.  Aortic valve is normal and pulmonic valve is normal. D-loop.  Cardiac position is Levocardia.        IMPRESSION -  Technically difficult imaging -  Images consistent with tricuspid atresia, d-TGA, VSD and RV hypoplasia S/P DKS and lateral tunnel Fontan.  Normal intrahepatic inferior vena cava and hepatic veins joining lateral tunnel Fontan and slow moving spontaneous contrast, laminar flow and no obvious thrombus in IVC, hepatic veins, proximal lateral tunnel or SVC.  Unable to demonstrate remaining segments of Fontan circuit and pulmonary arteries with very limited suprasternal windows.  Small functional right atrium.  Unrestricted bidirectional flow at atrial septum.  Limited images demonstrating no obvious obstruction of pulmonary venous return to the dilated left atrium.  The left atrial volume index is severely enlarged measuring 108  ml/m2.  Large mitral annulus.  Mild mitral valve insufficiency.  Dilated left ventricle - severe.  Qualitatively low normal left (single) ventricle systolic function with EF estimated 50 -55% from apical views.  Limited images demonstrate unrestricted flow at VSD to anterior aorta and small subaortic chamber (Right ventricle).  Anterior native aortic valve with no stenosis and trivial to mild insufficiency.  Posteriorly positioned, mildly sclerotic, trileaflet pulmonary valve committed to the left ventricle with no left ventricular outflow obstruction, mild insufficiency and no valvar stenosis.  Good imaging of the DKS anastomosis with no evidence of obstruction in either limb.  Technically difficult suprasternal imaging of the aorta demonstrates left aortic arch branching with no evidence of coarctation.  No pericardial effusion.    (08/18/2022)  CONGENITAL CARDIAC HISTORY:  Tricuspid atresia, hypoplastic right ventricle with ventricular septal defect and transposition of the great arteries.  INTERVENTION:  S/P [id number] and lateral tunnel Fontan.  S/P Coil and Amplatzer occlusion of venovenous collaterals (detailed description of anatomy in cath report July 2010).     SEGMENTAL CARDIAC CONNECTIONS:  Abdominal situs is solitus.   Atrial situs is normal.   Tricuspid atresia.   Abnormal mitral valve with tricuspid atresia.   Very dilated, globular left ventricle with very hypoplastic right ventricle.   D-transposed great vessels.   Aortic valve is normal and pulmonic valve is normal. D-loop.   Cardiac position is Levocardia.     IMPRESSION  Technically difficult imaging -  Images consistent with tricuspid atresia, d-TGA, VSD and RV hypoplasia S/P DKS and lateral tunnel Fontan.  Qualitative impression of improved LV function compared to previous study with no other significant changes noted.  Normal intrahepatic inferior vena cava and hepatic veins joining lateral tunnel Fontan and slow moving spontaneous  contrast, laminar flow and no obvious thrombus in IVC, hepatic veins, proximal lateral tunnel or SVC.  Unable to demonstrate remaining segments of Fontan circuit and pulmonary arteries with very limited suprasternal windows.  Small functional right atrium.  Unrestricted bidirectional flow at atrial septum.  The left atrial volume index is severely enlarged measuring 101 ml/m2.   Large mitral annulus.  At least mild mitral valve insufficiency.  Dilated left ventricle - severe.  Qualitatively lborderline left (single) ventricle systolic function with EF estimated 45 -50% from apical views.  Limited images suggest unrestricted flow at VSD to anterior aorta and small subaortic chamber (Right ventricle).  Anterior native aortic valve with no stenosis and trivial to mild insufficiency.  Posteriorly positioned trileaflet pulmonary valve committed to the left ventricle with no left ventricular outflow obstruction, trivial to mild insufficiency and no valvar stenosis.  Good imaging of the DKS anastomosis with no evidence of obstruction in either limb.  No pericardial effusion.      Pacemaker Interrogation:  (02/08/2024, in clinic)    General comments: Device interrogation and lead testing performed. Device WNL, leads stable. Presenting rhythm AP/ with occasional VS AMS x3, no EGM; HVR x 14. Available EGM' s reveal likely AT/SVT. Longest episode 3 minutes 27 seconds.    Reprogramming comments: No changes this session    Thresholds RA Lead: 1.5 V @ 1 ms. Configuration: unipolar. RV Lead: 1 V @ 0.5 ms. Configuration: bipolar.    Mode: DDDR Lower limit rate: 60 bpm Upper tracking rate: 120 bpm    (01/22/2024, remote)    REMOTE transmission received and data reviewed. Device and leads WNL. Battery longevity 1.6 Atrial paced 63 % Ventricular paced 57 %    2 AMS- noise artifact noted 1 VHR-19 sec, AIVR noted       (09/07/2023, in clinic))    Mode: DDDR Lower limit rate: 75 bpm Upper tracking rate: 130 bpm    Reprogramming  comments: No changes this session    General comments: Device interrogation and lead testing performed. Device and leads WNL. 59 HVR's    (06/14/2023)  REMOTE transmission received and data reviewed. Device and leads WNL. Battery longevity 2.0-2.6 yrs Atrial paced 40 % Ventricular paced 42 % 2 AMS- 1 EGM- Noise artifact noted 16 VHRs- Idioventricular rhythm with intermittent V-pacing    (08/18/2022, in clinic)  Permanent Programming   RA Lead: 2.0 V @ 1 ms. Sensitivity: 2 mV.   RV Lead: 1.375 Auto V @ 0.5 ms. Sensitivity: 2 mV.   Pacemaker Generator and Leads meet standard of FDA approval.   Chamber type: dual.   Mode: DDDR   Lower limit rate: 75 bpm   Upper tracking rate: 130 bpm   AV Delay Fixed: 200 msec   PV Delay Fixed: 200 msec  Battery voltage: 2.99 V  Estimated longevity: 3.9 - 4.8 years .   Magnet Rate: 100 ppm  Leads  RA Lead:        P/R-wave: (none)Paced mV       Lead Impedance: 340 Ohms       Paced: 82%   RV Lead:        Impedance: 350 Ohms       Paced: 78%   Thresholds  RA Lead: 1 V @ 1 ms. Configuration: unipolar.   RV Lead: 1.25 V @ 0.5 ms. Configuration: bipolar.  Reprogramming comments:  No changes this session   General comments:   Device interrogation and lead testing performed. Device and leads WNL.  HVR x 16. Longest 10 minutes 24 seconds;  Available EGM's show VS episodes; Minimal data on atrial EGM - occasional AP or noise artifact noted.       Catheterization:   (11/28/2023)  1. S,L,L TGA/VSD/tricuspid valve atresia s/p staged palliation with DKS/modified Fontan  2. Dilated lateral tunnel.  3. Good Fontan hemodynamics (CVp 14 mmHg, TPG 5 mmHg, PVR 2WU , CO 5 L/m)  4. Multiple small veno-venous collaterals.  5. Bilateral pulmonary micro-fistulae.  6. IART cardioverted to AV paced rhythm.      (07/07/2010)   1. Complex univentricular cardiac defect with superior inferior ventricles with double inlet left ventricle (atrioventricular connection not defined at catheterization), status post  Dahju-[id number]-Guphzyj connection and intracardiac lateral tunnel Fontan.   2. Moderate neoaortic (pulmonary) valve insufficiency.   3. Left aorta.   4. 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   5. Elevated central venous pressure (mean 19), transpulmonary gradient 4-5 mmHg.    Assessment / Plan:   Marcello Briggs is a 48 y.o. male who presents to [facility] at 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    He was noted to be in atrial fibrillation or IART in late September and underwent cardioversion on 10/5/2023.    He has had a number of high rate episodes this past interrogation. We have seen this before, and considered this possibly noise vs arrhythmia. Because this affects his atrial pacing, we returned atrial sensitivity back to 2.0mV at an earlier clinic visit in 2023.     Was lead characteristics looked reasonable today, I do have some concern with the noise that was seen of how well his atrial lead actually works.  One factor that makes this more complicated is that the voltage of his atrial flutter waves are very low, and I suspect the device is undersensing the atrial arrhythmia, though high ventricular rate episodes likely suggest an atrial arrhythmias his case.  We will continue to follow this closely.    His hemodynamics were fairly reassuring.  He required a cardioversion as recent catheterization, and following this procedure he was loaded with sotalol.    At his visit with me in 9/2023, it was noted by he that he was off metoprolol (at least a month) and Sotalol (likely a couple years). Also extremely important is that he is not using his CPAP for MIGUEL, particularly as many episodes have previously been noted to occur in the early morning.  He states he has not yet compliant with this.  He says he will order a new mask, and I also noted  that I would be happy to refer him to sleep Medicine again if that were needed.  Both at this visit an earlier visits we extensively discussed the relationship of sleep apnea and atrial arrhythmias, which has been a significant issue for him in the past.  Proper treatment of sleep apnea would be an integral portion of both arrhythmia prevention and ideal care of someone with Fontan circulation.    He is noted losing several teeth in the past month or so and at least another feels loose.  I see a significant concern in regards to his overall dentition.  He did not have any chips or loose teeth immediately after the catheterization, so I do not believe it is related to anesthesia (he was also not intubated from what I recall).        Follow-up:   3 months remote transmission  At minimum 6 months clinic interrogation with ECG, echocardiogram, and 24hr heart rhythm monitor  Cardiac medications:    Digoxin  Enalapril  Lasix  Metoprolol XL  Sotalol   Warfarin  SBE:  Yes.  Antibiotic prophylaxis is required prior to all dental procedures cleanings.    Please contact us if he has any questions or concerns.  Our clinic from his [phone] during office hours. For urgent night and weekend concerns, call [phone] and ask for the pediatric cardiologist on call to be paged.

## 2024-02-08 ENCOUNTER — HOSPITAL ENCOUNTER (OUTPATIENT)
Dept: CARDIOLOGY | Facility: CLINIC | Age: 49
Discharge: HOME | End: 2024-02-08
Payer: COMMERCIAL

## 2024-02-08 ENCOUNTER — OFFICE VISIT (OUTPATIENT)
Dept: CARDIOLOGY | Facility: CLINIC | Age: 49
End: 2024-02-08
Payer: COMMERCIAL

## 2024-02-08 ENCOUNTER — TELEPHONE (OUTPATIENT)
Dept: HEPATOLOGY | Facility: CLINIC | Age: 49
End: 2024-02-08
Payer: COMMERCIAL

## 2024-02-08 ENCOUNTER — CLINICAL SUPPORT (OUTPATIENT)
Dept: CARDIOLOGY | Facility: CLINIC | Age: 49
End: 2024-02-08
Attending: PEDIATRICS
Payer: COMMERCIAL

## 2024-02-08 ENCOUNTER — HOSPITAL ENCOUNTER (OUTPATIENT)
Dept: PEDIATRIC CARDIOLOGY | Facility: HOSPITAL | Age: 49
Discharge: HOME | End: 2024-02-08
Attending: PEDIATRICS
Payer: COMMERCIAL

## 2024-02-08 VITALS
OXYGEN SATURATION: 89 % | WEIGHT: 157.19 LBS | HEIGHT: 63 IN | BODY MASS INDEX: 27.85 KG/M2 | BODY MASS INDEX: 27.85 KG/M2 | HEIGHT: 63 IN | WEIGHT: 157.19 LBS | HEART RATE: 63 BPM | HEART RATE: 63 BPM | SYSTOLIC BLOOD PRESSURE: 148 MMHG | SYSTOLIC BLOOD PRESSURE: 148 MMHG | DIASTOLIC BLOOD PRESSURE: 66 MMHG | OXYGEN SATURATION: 89 % | DIASTOLIC BLOOD PRESSURE: 66 MMHG

## 2024-02-08 DIAGNOSIS — Q22.4 TRICUSPID ATRESIA WITH D-TRANSPOSITION OF GREAT ARTERIES: ICD-10-CM

## 2024-02-08 DIAGNOSIS — Q24.9 HEART FAILURE DUE TO CONGENITAL HEART DISEASE: ICD-10-CM

## 2024-02-08 DIAGNOSIS — Q20.3 TRICUSPID ATRESIA WITH D-TRANSPOSITION OF GREAT ARTERIES: ICD-10-CM

## 2024-02-08 DIAGNOSIS — Q25.42 VSD (VENTRICULAR SEPTAL DEFECT AND AORTIC ARCH HYPOPLASIA: ICD-10-CM

## 2024-02-08 DIAGNOSIS — Z95.0 PACEMAKER: ICD-10-CM

## 2024-02-08 DIAGNOSIS — Q21.0 VSD (VENTRICULAR SEPTAL DEFECT AND AORTIC ARCH HYPOPLASIA: ICD-10-CM

## 2024-02-08 DIAGNOSIS — Z98.890 S/P FONTAN PROCEDURE: ICD-10-CM

## 2024-02-08 DIAGNOSIS — I50.9 HEART FAILURE DUE TO CONGENITAL HEART DISEASE: ICD-10-CM

## 2024-02-08 DIAGNOSIS — I49.5 SINUS NODE DYSFUNCTION: Primary | ICD-10-CM

## 2024-02-08 DIAGNOSIS — I48.4 ATYPICAL ATRIAL FLUTTER: ICD-10-CM

## 2024-02-08 DIAGNOSIS — Q25.42 VSD (VENTRICULAR SEPTAL DEFECT AND AORTIC ARCH HYPOPLASIA: Primary | ICD-10-CM

## 2024-02-08 DIAGNOSIS — I49.5 SINUS NODE DYSFUNCTION: ICD-10-CM

## 2024-02-08 DIAGNOSIS — I49.9 VENTRICULAR ARRHYTHMIA: ICD-10-CM

## 2024-02-08 DIAGNOSIS — Q24.9 ADULT CONGENITAL HEART DISEASE: ICD-10-CM

## 2024-02-08 DIAGNOSIS — Q21.0 VSD (VENTRICULAR SEPTAL DEFECT AND AORTIC ARCH HYPOPLASIA: Primary | ICD-10-CM

## 2024-02-08 DIAGNOSIS — I48.92 ATRIAL FLUTTER, UNSPECIFIED TYPE: ICD-10-CM

## 2024-02-08 LAB
OHS QRS DURATION: 40 MS
OHS QTC CALCULATION: 444 MS

## 2024-02-08 PROCEDURE — 99214 OFFICE O/P EST MOD 30 MIN: CPT | Mod: S$GLB,,, | Performed by: PEDIATRICS

## 2024-02-08 PROCEDURE — 93242 EXT ECG>48HR<7D RECORDING: CPT

## 2024-02-08 PROCEDURE — 93244 EXT ECG>48HR<7D REV&INTERPJ: CPT | Mod: ,,, | Performed by: PEDIATRICS

## 2024-02-08 PROCEDURE — 99215 OFFICE O/P EST HI 40 MIN: CPT | Mod: S$GLB,,, | Performed by: PEDIATRICS

## 2024-02-08 PROCEDURE — 3078F DIAST BP <80 MM HG: CPT | Mod: CPTII,S$GLB,, | Performed by: PEDIATRICS

## 2024-02-08 PROCEDURE — 3008F BODY MASS INDEX DOCD: CPT | Mod: CPTII,S$GLB,, | Performed by: PEDIATRICS

## 2024-02-08 PROCEDURE — 3075F SYST BP GE 130 - 139MM HG: CPT | Mod: CPTII,S$GLB,, | Performed by: PEDIATRICS

## 2024-02-08 PROCEDURE — 1159F MED LIST DOCD IN RCRD: CPT | Mod: CPTII,S$GLB,, | Performed by: PEDIATRICS

## 2024-02-08 PROCEDURE — 93010 ELECTROCARDIOGRAM REPORT: CPT | Mod: S$GLB,,, | Performed by: INTERNAL MEDICINE

## 2024-02-08 PROCEDURE — 99999 PR PBB SHADOW E&M-EST. PATIENT-LVL III: CPT | Mod: PBBFAC,,, | Performed by: PEDIATRICS

## 2024-02-08 PROCEDURE — 93005 ELECTROCARDIOGRAM TRACING: CPT | Mod: S$GLB,,, | Performed by: PEDIATRICS

## 2024-02-08 PROCEDURE — 93280 PM DEVICE PROGR EVAL DUAL: CPT | Mod: S$GLB,,, | Performed by: PEDIATRICS

## 2024-02-08 PROCEDURE — 99999 PR PBB SHADOW E&M-EST. PATIENT-LVL I: CPT | Mod: PBBFAC,,,

## 2024-02-08 NOTE — TELEPHONE ENCOUNTER
----- Message from Sara Goodson MA sent at 2/8/2024  2:55 PM CST -----  Regarding: FW: follow up    ----- Message -----  From: Dereje Acosta MD  Sent: 2/8/2024   2:42 PM CST  To: [facility] Staff  Subject: follow up                                        He is way overdue for follow up with Dr. Cortes.  Could you get him in to her [place] clinic?  He is a little hardheaded - I guess there were appointment issues the last time and he got frustrated.  he promises to follow through.

## 2024-02-08 NOTE — PROGRESS NOTES
"02/08/2024    Patient Name: LEVON GANDHI  YOB: 1975  [facility] Number: [id number]    Kevin Chaves MD  [address]  [facility] [address]    MIRIAM Garcia MD  [address] [facility]   [address]  [place] [address]    Dear Dr. Chaves and Jose:    It is a pleasure to see Levon at our main campus adult congenital cardiology clinic to evaluate tricuspid atresia.    Levon is a 48 y.o. Male with a complex past medical history. To summarize, his diagnoses are as follows:  1. 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   - History of a classic Roger shunt and a 12 mm conduit from the right atrium to the left pulmonary artery along with a Damus Judy Stansel operation    - now with mild native and brigette-aortic insufficiency   - lateral tunnel Fontan with a 16 mm ring Goretex graft from the left pulmonary artery to the right pulmonary artery.   - elevated central venous pressures with mild desaturation at least partially due to collateral vessels - improved after cath 2010    - mild mitral regurgitation    - Last liver US September 2020  - excellent hemodynamics on cath November 2023  2. 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     - arrhythmias typically manifest as "back pain"   - nonsustained VT, Noninvasive EP study (NIEPS) negative 2016   - now status post cardioversion back out of intra-atrial reentrant tachycardia November 2023.  Loaded on sotalol.  3. Varicose veins followed in the past by vascular Medicine.  Improved pain and swelling.  Of note, the patient was accessed via the right femoral vein without difficulty at the 2010 catheterization.  4. No evidence of protein-losing enteropathy.    5. obstructive sleep apnea, noncompliant with CPAP  6.  Noncompliant with coumadin INR monitoring  7.  Low normal to mildly decreased " systemic ventricular function    Discussion:  For a 48-year-old Fontan, he looks great.  His cath in November looked great (surprisingly so) despite being in atrial arrhythmia.  I ordered the catheterization primarily to measure the transpulmonary gradient, to see if we should consider adding bosentan or mass attention to his phosphodiesterase inhibitor.  His CVP is 15 which is very good for a Fontan of his age, in the transpulmonary gradient was reassuring.  He is feeling much better now that he has a less physical job.    Eliquis is a great medication for him.  He was not compliant with INR checks in the past, and he clearly needs to be on more anticoagulation than just a baby aspirin.  We will continue that medication.      He is seen electrophysiology for his arrhythmia today.    My plan is as follows:  1.  CPAP for obstructive sleep apnea.  2.  Continue current medications  3.  EP visit today  4.  Regular follow-up with hepatology - will refer him back.  5.  follow up with echo, ekg, me and EP 6 months.  6.  Regular dental care.  I would recommend endocarditis prophylaxis before dental work.    Interval history:     Overall, he is doing pretty well.  Initially he felt very fatigued for several days after his heart catheterization, but that has improved.  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  He feels a lot better.  His leg pain and lower extremity edema are much improved.  No chest pain or shortness of breath.  No palpitations.  No syncope or near-syncope.  No diarrhea.  No fever.  No night sweats.  He did lose a few teeth, and he is trying to get in with a dentist.  Past Medical History:   Diagnosis Date    Asthma     Atrial flutter     Cyanosis     Heart murmur     Intra-atrial reentrant tachycardia     MIGUEL (obstructive sleep apnea) 10/23/2014    TGA (transposition of great arteries)     Tricuspid atresia     VSD (ventricular septal defect)      Past  Surgical History:   Procedure Laterality Date    ANGIOGRAM, PULMONARY, PEDIATRIC  11/28/2023    Procedure: Angiogram, Pulmonary, Pediatric;  Surgeon: Raza Lester Jr., MD;  Location: [facility] CATH LAB;  Service: Cardiology;;    AORTOGRAM, PEDIATRIC  11/28/2023    Procedure: Aortogram, Pediatric;  Surgeon: Raza Lester Jr., MD;  Location: [facility] CATH LAB;  Service: Cardiology;;    CARDIAC CATHETERIZATION      CARDIOVERSION      COMBINED RIGHT AND RETROGRADE LEFT HEART CATHETERIZATION FOR CONGENITAL HEART DEFECT N/A 11/28/2023    Procedure: Catheterization, Heart, Combined Right and Retrograde Left, for Congenital Heart Defect;  Surgeon: Raza Lester Jr., MD;  Location: [facility] CATH LAB;  Service: Cardiology;  Laterality: N/A;    ECHOCARDIOGRAM,TRANSESOPHAGEAL N/A 10/5/2023    Procedure: Transesophageal echo (JUNIE) intra-procedure log documentation;  Surgeon: Chang Dutton MD;  Location: [facility] EP LAB;  Service: Cardiology;  Laterality: N/A;    FONTAN PROCEDURE, EXTRACARDIAC      ICE, PERFORMED  11/28/2023    Procedure: ICE, Performed;  Surgeon: Raza Lester Jr., MD;  Location: [facility] CATH LAB;  Service: Cardiology;;    RADIOFREQUENCY ABLATION      REPLACEMENT OF PACEMAKER Right 10/08/2020    Procedure: REPLACEMENT-PACEMAKER GENERATOR;  Surgeon: Chang Garcia MD;  Location: [facility] OR [address] [place] [address];  Service: Cardiovascular;  Laterality: Right;    TREATMENT OF CARDIAC ARRHYTHMIA N/A 10/5/2023    Procedure: CARDIOVERSION;  Surgeon: Weiland, Michael D. Jr., MD;  Location: [facility] EP LAB;  Service: Cardiology;  Laterality: N/A;  Adult congenital heart; Fontan; AF, JUNIE, DCCV, MACADRIANA/ M. Weiland - will go to [facility] cath prep    VENOGRAM, CATH LAB  11/28/2023    Procedure: Venogram, Cath Lab;  Surgeon: Raza Lester Jr., MD;  Location: [facility] CATH LAB;  Service: Cardiology;;     Family History   Problem Relation Age of Onset    No Known Problems Mother     No Known Problems Father     No Known Problems Sister     No  Known Problems Brother     No Known Problems Maternal Grandmother     Heart disease Maternal Grandfather     No Known Problems Paternal Grandmother     Diabetes Paternal Grandfather     No Known Problems Maternal Aunt     No Known Problems Maternal Uncle     Diabetes Paternal Aunt     No Known Problems Paternal Uncle     Atrial Septal Defect Neg Hx     Anemia Neg Hx     Arrhythmia Neg Hx     Asthma Neg Hx     Clotting disorder Neg Hx     Fainting Neg Hx     Heart attack Neg Hx     Heart failure Neg Hx     Hyperlipidemia Neg Hx     Hypertension Neg Hx     Stroke Neg Hx      Social History     Socioeconomic History    Marital status: Legally    Tobacco Use    Smoking status: Never    Smokeless tobacco: Never   Substance and Sexual Activity    Alcohol use: No    Drug use: No   Social History Narrative    He is working at [employer]     Social Determinants of Health     Financial Resource Strain: Low Risk  (11/29/2023)    Overall Financial Resource Strain (CARDIA)     Difficulty of Paying Living Expenses: Not hard at all   Recent Concern: Financial Resource Strain - Medium Risk (11/28/2023)    Overall Financial Resource Strain (CARDIA)     Difficulty of Paying Living Expenses: Somewhat hard   Food Insecurity: No Food Insecurity (11/29/2023)    Hunger Vital Sign     Worried About Running Out of Food in the Last Year: Never true     Ran Out of Food in the Last Year: Never true   Recent Concern: Food Insecurity - Food Insecurity Present (11/28/2023)    Hunger Vital Sign     Worried About Running Out of Food in the Last Year: Sometimes true     Ran Out of Food in the Last Year: Sometimes true   Transportation Needs: No Transportation Needs (11/29/2023)    PRAPARE - Transportation     Lack of Transportation (Medical): No     Lack of Transportation (Non-Medical): No   Physical Activity: Inactive (11/29/2023)    Exercise Vital Sign     Days of Exercise per Week: 0 days     Minutes of Exercise per Session: 0 min    Stress: No Stress Concern Present (11/29/2023)    [place] [employer] - Occupational Stress Questionnaire     Feeling of Stress : Not at all   Social Connections: Moderately Integrated (11/29/2023)    Social Connection and Isolation Panel [NHANES]     Frequency of Communication with Friends and Family: More than three times a week     Frequency of Social Gatherings with Friends and Family: Three times a week     Attends Congregational Services: More than 4 times per year     Active Member of Clubs or Organizations: Yes     Attends Club or Organization Meetings: 1 to 4 times per year     Marital Status:    Housing Stability: Low Risk  (11/29/2023)    Housing Stability Vital Sign     Unable to Pay for Housing in the Last Year: No     Number of Places Lived in the Last Year: 1     Unstable Housing in the Last Year: No   Recent Concern: Housing Stability - High Risk (11/28/2023)    Housing Stability Vital Sign     Unable to Pay for Housing in the Last Year: Yes     Number of Places Lived in the Last Year: 1     Unstable Housing in the Last Year: No     Current Outpatient Medications on File Prior to Visit   Medication Sig Dispense Refill    apixaban (ELIQUIS) 5 mg Tab Take 1 tablet (5 mg total) by mouth 2 (two) times daily. 60 tablet 11    digoxin (LANOXIN) 250 mcg tablet TAKE 1 TABLET(0.25 MG) BY MOUTH EVERY MORNING 30 tablet 3    enalapril (VASOTEC) 10 MG tablet Take 1 tablet (10 mg total) by mouth 2 (two) times daily. 60 tablet 3    furosemide (LASIX) 20 MG tablet Take 1 tablet (20 mg total) by mouth once daily. (Patient taking differently: Take 20 mg by mouth once daily. Per pt medication is taken on an as needed basis.) 30 tablet 11    metoprolol succinate (TOPROL-XL) 100 MG 24 hr tablet Take 3 tablets (300 mg total) by mouth once daily. 270 tablet 3    sildenafil (REVATIO) 20 mg Tab Take 1 tablet (20 mg total) by mouth 2 (two) times daily. 60 tablet 3    sotaloL (BETAPACE) 80 MG tablet  "Take 1 tablet (80 mg total) by mouth 2 (two) times daily. 60 tablet 11     No current facility-administered medications on file prior to visit.     Review of patient's allergies indicates:  No Known Allergies     Vitals:    02/08/24 1053 02/08/24 1056   BP: 133/62 (!) 148/66   BP Location: Right arm Left leg   Patient Position: Sitting Lying   Pulse: 63    SpO2: (!) 89%    Weight: 71.3 kg (157 lb 3 oz)    Height: 5' 2.99" (1.6 m)      BP (!) 148/66 (BP Location: Left leg, Patient Position: Lying)   Pulse 63   Ht 5' 2.99" (1.6 m)   Wt 71.3 kg (157 lb 3 oz)   SpO2 (!) 89%   BMI 27.85 kg/m²   In general, his baseline ruddiness is stable.  He is a male in no apparent distress. Head is normocephalic and atraumatic. The eyes, nares, and oropharynx are clear.  No evidence of any infection in his mouth.  The neck is supple without obvious jugular venous distention or lymphadenopathy, and his face does not look swollen. Lungs are clear to auscultation bilaterally. The chest is symmetrical. Multiple well-healed surgical scars are noted. The pacemaker is palpated in the right upper chest. The area is nontender without evidence of infection. The precordium is quiet. First heart sound is normal. A loud single second heart sound is auscultated. A grade 2/6 systolic ejection murmur is auscultated. No diastolic murmurs or gallops are heard. The abdominal exam reveals a liver edge palpated about 2 cm below the right costal margin. It is not pulsatile. He abdomen is soft and nontender without evidence of ascites. I could not palpate his spleen. There is no significant clubbing and no obvious cyanosis. He has good pulses in his legs bilaterally. There is trivial edema in the mild pitting edema in the right leg up to about the mid shin. He does have varicose veins.  No palpable cords or calf tenderness.    I personally reviewed the following studies:  EKG shows paced rhythm.    Cath 11/28/23:  IMPRESSION:  1. S,L,L " TGA/VSD/tricuspid valve atresia s/p staged palliation with DKS/modified Fontan  2. Dilated lateral tunnel.  3. Good Fontan hemodynamics (CVp 14 mmHg, TPG 5 mmHg, PVR 2WU , CO 5 L/m)  4. Multiple small veno-venous collaterals.  5. Bilateral pulmonary micro-fistulae.  6. IART cardioverted to AV paced rhythm.      Results for orders placed during the hospital encounter of 09/07/23    Echo    Interpretation Summary  CONGENITAL CARDIAC HISTORY:  Tricuspid atresia, hypoplastic right ventricle with ventricular septal defect and transposition of the great arteries.  INTERVENTION:  S/P Cbnzo-Ispe-Czxyyru and lateral tunnel Fontan.  S/P Coil and Amplatzer occlusion of venovenous collaterals (detailed description of anatomy in cath report July 2010).    SEGMENTAL CARDIAC CONNECTIONS (previously demonstrated):  Abdominal situs is solitus.  Atrial situs is normal.  Tricuspid atresia.  Abnormal mitral valve with tricuspid atresia.  Very dilated, globular left ventricle with very hypoplastic right ventricle.  D-transposed great vessels.  Aortic valve is normal and pulmonic valve is normal. D-loop.  Cardiac position is Levocardia.      IMPRESSION -  Technically difficult imaging -  Images consistent with tricuspid atresia, d-TGA, VSD and RV hypoplasia S/P DKS and lateral tunnel Fontan.  Normal intrahepatic inferior vena cava and hepatic veins joining lateral tunnel Fontan and slow moving spontaneous contrast, laminar flow and no obvious thrombus in IVC, hepatic veins, proximal lateral tunnel or SVC.  Unable to demonstrate remaining segments of Fontan circuit and pulmonary arteries with very limited suprasternal windows.  Small functional right atrium.  Unrestricted bidirectional flow at atrial septum.  Limited images demonstrating no obvious obstruction of pulmonary venous return to the dilated left atrium.  The left atrial volume index is severely enlarged measuring 108 ml/m2.  Large mitral annulus.  Mild mitral valve  insufficiency.  Dilated left ventricle - severe.  Qualitatively low normal left (single) ventricle systolic function with EF estimated 50 -55% from apical views.  Limited images demonstrate unrestricted flow at VSD to anterior aorta and small subaortic chamber (Right ventricle).  Anterior native aortic valve with no stenosis and trivial to mild insufficiency.  Posteriorly positioned, mildly sclerotic, trileaflet pulmonary valve committed to the left ventricle with no left ventricular outflow obstruction, mild insufficiency and no valvar stenosis.  Good imaging of the DKS anastomosis with no evidence of obstruction in either limb.    Lab Results   Component Value Date    WBC 4.87 12/01/2023    HGB 16.2 12/01/2023    HCT 47.9 12/01/2023    MCV 93 12/01/2023    PLT 86 (L) 12/01/2023       CMP  Sodium   Date Value Ref Range Status   12/01/2023 138 136 - 145 mmol/L Final     Potassium   Date Value Ref Range Status   12/01/2023 4.3 3.5 - 5.1 mmol/L Final     Chloride   Date Value Ref Range Status   12/01/2023 106 95 - 110 mmol/L Final     CO2   Date Value Ref Range Status   12/01/2023 25 23 - 29 mmol/L Final     Glucose   Date Value Ref Range Status   12/01/2023 85 70 - 110 mg/dL Final     BUN   Date Value Ref Range Status   12/01/2023 17 6 - 20 mg/dL Final     Creatinine   Date Value Ref Range Status   12/01/2023 0.9 0.5 - 1.4 mg/dL Final     Calcium   Date Value Ref Range Status   12/01/2023 8.8 8.7 - 10.5 mg/dL Final     Total Protein   Date Value Ref Range Status   11/28/2023 7.1 6.0 - 8.4 g/dL Final     Albumin   Date Value Ref Range Status   11/28/2023 4.0 3.5 - 5.2 g/dL Final     Total Bilirubin   Date Value Ref Range Status   11/28/2023 2.1 (H) 0.1 - 1.0 mg/dL Final     Comment:     For infants and newborns, interpretation of results should be based  on gestational age, weight and in agreement with clinical  observations.    Premature Infant recommended reference ranges:  Up to 24 hours.............<8.0 mg/dL  Up  to 48 hours............<12.0 mg/dL  3-5 days..................<15.0 mg/dL  6-29 days.................<15.0 mg/dL       Alkaline Phosphatase   Date Value Ref Range Status   11/28/2023 100 55 - 135 U/L Final     AST   Date Value Ref Range Status   11/28/2023 24 10 - 40 U/L Final     ALT   Date Value Ref Range Status   11/28/2023 22 10 - 44 U/L Final     Anion Gap   Date Value Ref Range Status   12/01/2023 7 (L) 8 - 16 mmol/L Final     eGFR   Date Value Ref Range Status   12/01/2023 >60.0 >60 mL/min/1.73 m^2 Final      Latest Reference Range & Units Most Recent   Iron 45 - 160 ug/dL 159  9/7/23 11:43   TIBC 250 - 450 ug/dL 425  9/7/23 11:43   Saturated Iron 20 - 50 % 37  9/7/23 11:43   UIBC 110 - 370 ug/dl 325  1/31/13 15:19   Transferrin 200 - 375 mg/dL 287  9/7/23 11:43   Ferritin 20.0 - 300.0 ng/mL 125  9/7/23 11:43   Protime 9.0 - 12.5 sec 14.0 (H)  9/7/23 11:43   INR 0.8 - 1.2  1.3 (H)  9/7/23 11:43   (H): Data is abnormally high    GGT   Date Value Ref Range Status   09/07/2023 122 (H) 8 - 55 U/L Final     AFP   Date Value Ref Range Status   09/07/2023 2.3 0.0 - 8.4 ng/mL Final     Comment:     The testing method is a chemiluminescent microparticle immunoassay   manufactured by [employer] and performed on the [email or web address]   or   [employer] system. Values obtained with different assay manufacturers   for   methods may be different and cannot be used interchangeably.       BNP   Date Value Ref Range Status   09/07/2023 243 (H) 0 - 99 pg/mL Final     Comment:     Values of less than 100 pg/ml are consistent with non-CHF populations.     TSH   Date Value Ref Range Status   09/07/2023 1.918 0.400 - 4.000 uIU/mL Final     LE US 9/10/20:  No evidence of right lower extremity DVT.  No evidence of left lower extremity DVT.  Right greater saphenous superficial vein reflux is present.  Right common and deep femoral vein reflux is present.    Thank you for referring this patient to our clinic. Please call with any  questions.    Sincerely,        Dereje Acosta MD  Pediatric Cardiology  Adult Congenital Heart Disease  Pediatric Heart Failure and Transplantation  [facility] [address] [place]  [address]  [phone]

## 2024-02-09 LAB
AV DELAY - LONGEST: 200 MSEC
BATTERY VOLTAGE (V): 2.95 V
IMPEDANCE RA LEAD (NATIVE): 310 OHMS
IMPEDANCE RA LEAD: 300 OHMS
OHS CV DC PP MS1: 1 MS
OHS CV DC PP MS2: 0.5 MS
OHS CV DC PP V1: 3 V
OHS CV DC PP V2: NORMAL V
P/R-WAVE RA LEAD (NATIVE): 7.3 MV
PV DELAY - LONGEST: 200 MSEC
THRESHOLD MS RA LEAD (NATIVE): 0.5 MS
THRESHOLD MS RA LEAD: 1 MS
THRESHOLD V RA LEAD (NATIVE): 1 V
THRESHOLD V RA LEAD: 1.5 V

## 2024-03-13 LAB
OHS CV EVENT MONITOR DAY: 2
OHS CV HOLTER AFIB AVERAGE HR: 92 BPM
OHS CV HOLTER AFIB MAX HR: 196 BPM
OHS CV HOLTER AFIB MIN HR: 64 BPM
OHS CV HOLTER HOOKUP DATE: NORMAL
OHS CV HOLTER HOOKUP TIME: NORMAL
OHS CV HOLTER LENGTH DECIMAL HOURS: 52
OHS CV HOLTER LENGTH HOURS: 4
OHS CV HOLTER LENGTH MINUTES: 0
OHS CV HOLTER SCAN DATE: NORMAL
OHS CV HOLTER SINUS AVERAGE HR: 92 BPM
OHS CV HOLTER SINUS MAX HR: 196 BPM
OHS CV HOLTER SINUS MIN HR: 64 BPM
OHS CV HOLTER STUDY END DATE: NORMAL
OHS CV HOLTER STUDY END TIME: NORMAL

## 2024-05-10 ENCOUNTER — PATIENT MESSAGE (OUTPATIENT)
Dept: PEDIATRIC CARDIOLOGY | Facility: CLINIC | Age: 49
End: 2024-05-10

## 2024-05-13 ENCOUNTER — HOSPITAL ENCOUNTER (OUTPATIENT)
Dept: PEDIATRIC CARDIOLOGY | Facility: HOSPITAL | Age: 49
Discharge: HOME | End: 2024-05-13
Attending: PEDIATRICS
Payer: COMMERCIAL

## 2024-05-13 DIAGNOSIS — Q24.9 ADULT CONGENITAL HEART DISEASE: ICD-10-CM

## 2024-05-13 DIAGNOSIS — Z98.890 S/P FONTAN PROCEDURE: ICD-10-CM

## 2024-05-13 DIAGNOSIS — Q20.3 TRICUSPID ATRESIA WITH D-TRANSPOSITION OF GREAT ARTERIES: ICD-10-CM

## 2024-05-13 DIAGNOSIS — Q21.0 VSD (VENTRICULAR SEPTAL DEFECT AND AORTIC ARCH HYPOPLASIA: ICD-10-CM

## 2024-05-13 DIAGNOSIS — I49.5 SINUS NODE DYSFUNCTION: ICD-10-CM

## 2024-05-13 DIAGNOSIS — I49.9 VENTRICULAR ARRHYTHMIA: ICD-10-CM

## 2024-05-13 DIAGNOSIS — Z95.0 PACEMAKER: ICD-10-CM

## 2024-05-13 DIAGNOSIS — I50.9 HEART FAILURE DUE TO CONGENITAL HEART DISEASE: ICD-10-CM

## 2024-05-13 DIAGNOSIS — I48.4 ATYPICAL ATRIAL FLUTTER: ICD-10-CM

## 2024-05-13 DIAGNOSIS — Q25.42 VSD (VENTRICULAR SEPTAL DEFECT AND AORTIC ARCH HYPOPLASIA: ICD-10-CM

## 2024-05-13 DIAGNOSIS — Q24.9 HEART FAILURE DUE TO CONGENITAL HEART DISEASE: ICD-10-CM

## 2024-05-13 DIAGNOSIS — Q22.4 TRICUSPID ATRESIA WITH D-TRANSPOSITION OF GREAT ARTERIES: ICD-10-CM

## 2024-05-13 PROCEDURE — 93296 REM INTERROG EVL PM/IDS: CPT

## 2024-05-13 PROCEDURE — 93294 REM INTERROG EVL PM/LDLS PM: CPT | Mod: ,,, | Performed by: PEDIATRICS

## 2024-05-17 LAB
AV DELAY - LONGEST: 200 MSEC
BATTERY VOLTAGE (V): 2.93 V
IMPEDANCE RA LEAD (NATIVE): 310 OHMS
IMPEDANCE RA LEAD: 310 OHMS
OHS CV DC PP MS1: 1 MS
OHS CV DC PP MS2: 0.5 MS
OHS CV DC PP V1: 3 V
OHS CV DC PP V2: NORMAL V
P/R-WAVE RA LEAD (NATIVE): 9.4 MV
PV DELAY - LONGEST: 200 MSEC
THRESHOLD MS RA LEAD: 0.4 MS
THRESHOLD V RA LEAD: 1.62 V

## 2024-05-27 ENCOUNTER — PATIENT MESSAGE (OUTPATIENT)
Dept: CARDIOLOGY | Facility: CLINIC | Age: 49
End: 2024-05-27

## 2024-05-27 DIAGNOSIS — Q24.9 ADULT CONGENITAL HEART DISEASE: ICD-10-CM

## 2024-05-27 DIAGNOSIS — Z95.0 CARDIAC PACEMAKER IN SITU: ICD-10-CM

## 2024-05-27 DIAGNOSIS — Q24.9 HEART FAILURE DUE TO CONGENITAL HEART DISEASE: ICD-10-CM

## 2024-05-27 DIAGNOSIS — Z98.890 S/P FONTAN PROCEDURE: ICD-10-CM

## 2024-05-27 DIAGNOSIS — Q21.0 VSD (VENTRICULAR SEPTAL DEFECT AND AORTIC ARCH HYPOPLASIA: Primary | ICD-10-CM

## 2024-05-27 DIAGNOSIS — I50.9 HEART FAILURE DUE TO CONGENITAL HEART DISEASE: ICD-10-CM

## 2024-05-27 DIAGNOSIS — Q25.42 VSD (VENTRICULAR SEPTAL DEFECT AND AORTIC ARCH HYPOPLASIA: Primary | ICD-10-CM

## 2024-05-27 RX ORDER — DIGOXIN 250 MCG
TABLET ORAL
Qty: 90 TABLET | Refills: 3 | Status: SHIPPED | OUTPATIENT
Start: 2024-05-27

## 2024-07-24 ENCOUNTER — PATIENT MESSAGE (OUTPATIENT)
Dept: HEPATOLOGY | Facility: CLINIC | Age: 49
End: 2024-07-24

## 2024-08-16 ENCOUNTER — PATIENT MESSAGE (OUTPATIENT)
Dept: PEDIATRIC CARDIOLOGY | Facility: CLINIC | Age: 49
End: 2024-08-16
Payer: COMMERCIAL

## 2024-08-21 NOTE — PROGRESS NOTES
Name: Marcello Briggs  MRN: [id number]  : 1975      Subjective:   CC: Single Ventricular / Fontan Physiology    HPI:    Marcello Briggs is a 49 y.o. 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     At his last visit with me in 2024, we reviewed that he has a number of high rate episodes. He has CPAP for MIGUEL, but reported that he doesn't use it. He denies any palpitations.   Today he reports that he has been taking the sotalol, but has not been taking his metoprolol Eliquis for a little while.    To review, in his visit in 223, he noted that he hadn't taken his sotalol in several years. While I had not discontinued it, he reports being unsure if I should have been on the sotalol, but never asked. He also has not taken his metoprolol for at least 1 month (though I suspect longer). His atrial sensitivity was decreased with this, and it appears to have been oversensing some lead noise, so we increased that then.   His hemodynamics from his last cath in  were fairly reassuring.  He required a cardioversion at that catheterization as he was found to be in atrial flutter, and following this procedure he was loaded with sotalol.        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  Cardiac/Surgical Hx  History of a classic Roger shunt and a 12 mm conduit from the right atrium to the left pulmonary artery along with a Damus Judy Stansel operation   now with mild native and brigette-aortic insufficiency  lateral tunnel Fontan with a 16 mm ring Goretex graft from the left pulmonary artery to the right pulmonary artery.  elevated central venous pressures with mild desaturation at least partially due to collateral vessels - improved after cath  but still an issue.  mild  "mitral regurgitation   Mildly decreased systemic ventricular function  Related:  Last liver US September 2020  No evidence of protein-losing enteropathy.  Recurrent intra-atrial reentrant tachycardia   arrhythmias typically manifest as "back pain"  Sinus node dysfunction   most recent pacemaker/ICD change 10/08/2020  Nonsustained VT  Noninvasive EP study (NIEPS) negative 2016  Varicose veins   followed in the past by vascular Medicine.    Improved pain and swelling.    Of note, the patient was accessed via the right femoral vein without difficulty at the 2010 catheterization.  Obstructive sleep apnea      Family Hx:  No known family history of congenital heart defects or cardiac surgeries in childhood.  No known family members with pacemakers or defibrillators.  No known inherited channelopathies or cardiomyopathies.  No known hx of sudden cardiac death or heart transplant.  No No known heart attack in someone less than 50yoa.    Social Hx:  Lives in [place], [place].    Review of Systems:  See HPI  ALL: See below.    Medications & Allergy:  Current Outpatient Medications on File Prior to Visit   Medication Sig Dispense Refill    apixaban (ELIQUIS) 5 mg Tab Take 1 tablet (5 mg total) by mouth 2 (two) times daily. 60 tablet 11    digoxin (LANOXIN) 250 mcg tablet TAKE 1 TABLET(0.25 MG) BY MOUTH EVERY MORNING 90 tablet 3    enalapril (VASOTEC) 10 MG tablet Take 1 tablet (10 mg total) by mouth 2 (two) times daily. 60 tablet 3    furosemide (LASIX) 20 MG tablet Take 1 tablet (20 mg total) by mouth once daily. (Patient taking differently: Take 20 mg by mouth once daily. Per pt medication is taken on an as needed basis.) 30 tablet 11    metoprolol succinate (TOPROL-XL) 100 MG 24 hr tablet Take 3 tablets (300 mg total) by mouth once daily. 270 tablet 3    sildenafil (REVATIO) 20 mg Tab Take 1 tablet (20 mg total) by mouth 2 (two) times daily. 60 tablet 3    sotaloL (BETAPACE) 80 MG tablet Take 1 tablet (80 mg total) by mouth " "2 (two) times daily. 60 tablet 11     No current facility-administered medications on file prior to visit.       Review of patient's allergies indicates:  No Known Allergies       Objective:   Vitals:  Vitals:    08/22/24 0956   BP: 124/66   BP Location: Right arm   Patient Position: Sitting   Pulse: 60   SpO2: (!) 85%   Weight: 75.3 kg (166 lb 0.1 oz)   Height: 5' 2.99" (1.6 m)         Body mass index is 29.41 kg/m².  Body surface area is 1.83 meters squared.    Exam:  GEN: No acute distress, Normal appearing  EYE: Anicteric sclerae  ENT: No drainage, Moist mucous membranes  PULM: Normal work of breathing;  Clear to auscultation bilaterally, Good air movement throughout.  CV: No chest pain;   Single S2,   I/VI systolic murmur;   No rubs or gallops;  EXT: No cyanosis, Mild RLE edema. Significant varices noted (unchanged)   2+ radial and dorsalis pedis pulses bilaterally  ABD: Soft, Non-distended, Non-tender,    Liver edge 3cm below RCM;  Normal bowel sounds  DERM: No rashes  NEUR: Normal gait, Grossly normal tone.  PSY: Normal mood and affect      Results / Data:   ECG:   (08/22/2024) -  Atrial paced rhythm versus atrial flutter with atrial undersensing, both paced and sensed ventricular rhythm noted.  (11/02/2023) - Atrial paced rhythm, intraventricular conduction delay.  (09/07/2023) - AV sequentially paced rhythm and competing junctional rhythm.  (08/18/2022) - Atrial paced rhythm, intraventricular conduction delay.  (10/22/2020) - Atrial paced rhythm, intraventricular conduction delay.    Holter/Zio:  (08/22/2024)  - Pending, placed today.    (02/08/2024)      (09/17/2023)  Atrial fibrillation noted.  Reports 100% atrial fibrillation but appears some extent is AP-. So normal rhythm is likely under-represented.  Ventricular HR range .  No patient-triggered events.  Occasional isolated PVCs (1.9%).    (08/18/2022)  Sinus rhythm or paced rhythm throughout.  Normal HR range.  No patient-triggered events.  No " significant ectopy burden.    (09/18/2020)  Predominant rhythm is AV sequential pacing  Rare atrial/ventricular ectopy  20 episodes of slow SVT (longest 9 beats fastest 133 bpm)  One 4 beat episode of slow VT (max rate 156 bpm) and one ventricular triplet  No diary symptoms    (9/18/20)  Predominant rhythm is AV sequential pacing  Rare atrial/ventricular ectopy  20 episodes of slow SVT (longest 9 beats fastest 133 bpm)  One 4 beat episode of slow VT (max rate 156 bpm) and one ventricular triplet  No diary symptoms    Echocardiogram:   (08/22/2024)   CONGENITAL CARDIAC HISTORY:  Tricuspid atresia, hypoplastic right ventricle with ventricular septal defect and transposition of the great arteries.  INTERVENTION:  S/P [id number]-[id number] and lateral tunnel Fontan.  S/P Coil and Amplatzer occlusion of venovenous collaterals (detailed description of anatomy in cath report July 2010).     SEGMENTAL CARDIAC CONNECTIONS (previously demonstrated):  Abdominal situs is solitus.  Atrial situs is normal.  Tricuspid atresia.  Abnormal mitral valve with tricuspid atresia.  Very dilated, globular left ventricle with very hypoplastic right ventricle.  D-transposed great vessels.  Aortic valve is normal and pulmonic valve is normal. D-loop.  Cardiac position is Levocardia.      IMPRESSION -  Technically difficult imaging -  Images consistent with tricuspid atresia, d-TGA, VSD and RV hypoplasia S/P DKS and lateral tunnel Fontan.  Normal intrahepatic inferior vena cava and hepatic veins joining lateral tunnel Fontan and slow moving spontaneous contrast, laminar flow and no obvious thrombus in IVC, hepatic veins, proximal lateral tunnel.  Unable to demonstrate remaining segments lateral tunnel Fontan and Fontan anastomosis.    Color Doppler demonstrates unobstructed flow in the right superior vena cava and at Roger anastomosis with limited imaging of the pulmonary confluence.    The proximal pulmonary branches are not demonstrated.     Small functional right atrium with unrestricted bidirectional flow at atrial septum.  Limited images demonstrating no obvious obstruction of pulmonary venous return to the dilated left atrium.  The left atrial volume index is severely enlarged measuring 89 ml/m2.  Large mitral annulus.  Mild to moderate mitral valve insufficiency.  Dilated left ventricle - severe.  Qualitatively mildly diminished left (single) ventricle systolic function with EF estimated 45-50% from apical views.  Limited images demonstrate unrestricted flow at VSD to anterior native aortic valve and small subaortic chamber (Right ventricle).  Native aortic valve with no stenosis and mild insufficiency.  Posteriorly positioned, mildly sclerotic, trileaflet neoaortic valve committed to the left ventricle with no left ventricular outflow obstruction, mild insufficiency and no valvar stenosis.  Good imaging of the DKS anastomosis with no evidence of obstruction in either limb.  Technically difficult suprasternal imaging of the aorta demonstrates left aortic arch branching with no evidence of coarctation.  No pericardial effusion.        (09/07/2023)   CONGENITAL CARDIAC HISTORY:  Tricuspid atresia, hypoplastic right ventricle with ventricular septal defect and transposition of the great arteries.  INTERVENTION:  S/P Txoxa-[id number]-[id number] and lateral tunnel Fontan.  S/P Coil and Amplatzer occlusion of venovenous collaterals (detailed description of anatomy in cath report July 2010).     SEGMENTAL CARDIAC CONNECTIONS (previously demonstrated):  Abdominal situs is solitus.  Atrial situs is normal.  Tricuspid atresia.  Abnormal mitral valve with tricuspid atresia.  Very dilated, globular left ventricle with very hypoplastic right ventricle.  D-transposed great vessels.  Aortic valve is normal and pulmonic valve is normal. D-loop.  Cardiac position is Levocardia.        IMPRESSION -  Technically difficult imaging -  Images consistent with tricuspid atresia,  d-TGA, VSD and RV hypoplasia S/P DKS and lateral tunnel Fontan.  Normal intrahepatic inferior vena cava and hepatic veins joining lateral tunnel Fontan and slow moving spontaneous contrast, laminar flow and no obvious thrombus in IVC, hepatic veins, proximal lateral tunnel or SVC.  Unable to demonstrate remaining segments of Fontan circuit and pulmonary arteries with very limited suprasternal windows.  Small functional right atrium.  Unrestricted bidirectional flow at atrial septum.  Limited images demonstrating no obvious obstruction of pulmonary venous return to the dilated left atrium.  The left atrial volume index is severely enlarged measuring 108 ml/m2.  Large mitral annulus.  Mild mitral valve insufficiency.  Dilated left ventricle - severe.  Qualitatively low normal left (single) ventricle systolic function with EF estimated 50 -55% from apical views.  Limited images demonstrate unrestricted flow at VSD to anterior aorta and small subaortic chamber (Right ventricle).  Anterior native aortic valve with no stenosis and trivial to mild insufficiency.  Posteriorly positioned, mildly sclerotic, trileaflet pulmonary valve committed to the left ventricle with no left ventricular outflow obstruction, mild insufficiency and no valvar stenosis.  Good imaging of the DKS anastomosis with no evidence of obstruction in either limb.  Technically difficult suprasternal imaging of the aorta demonstrates left aortic arch branching with no evidence of coarctation.  No pericardial effusion.      Pacemaker Interrogation:  (08/22/2024, in-clinic)     Device interrogation and lead testing performed. Device WNL, leads stable.    Unable to sense P waves today after multiple attempts. No AS episodes noted. Atrial threshold appropriate.    R Wave 8.3, Threshold appropriate. Ventricular auto capture noted to be in high output mode. Auto capture unable to obtain threshold in clinic. Multiple ventricular threshold tests note threshold to  be between 1-1.5 V. Auto capture turned off, set at 3.0 V.    HVR x2. Longest episode 1 minute 9 seconds, Peak V rate not noted.    Thresholds RA Lead: 1.25 V @ 1 ms. Configuration: unipolar. RV Lead: 1.5 V @ 0.5 ms. Configuration: bipolar.    Mode: DDDR Lower limit rate: 60 bpm Upper tracking rate: 120 bpm    Reprogramming comments: V Auto Capture ON --> OFF; V Pulse amplitude 3.0 V    MD-Notes: Presenting rhythm was AP/VS with occasional . On closer inspection, it remains unclear due to very low voltage P-wave if patient is in atrial flutter vs sinus bradycardia, even with direct evaluation of the device and inpection of the 12-lead. Pacing at AAI at various rates resulted in either VS beats with Wenckebach vs  no change in ventricular response with variable A:V conduction as the rhythm could have been atrial flutter. The most significant argument of him being in atrial flutter is that he has been pacing less in the ventricle (ventricular rate response has not been fast for him in thepast) as he previously predominatly paces the ventricle in sinus and less so in atrial flutter.     (05/13/2024, remote)     REMOTE transmission received and data reviewed. Device and leads WNL. Battery longevity 1.3-1.6 yrs    Presenting Rhythm: AP- @ ~ 60 bpm    Atrial paced 61 % Ventricular paced 55 %    Thresholds RV Lead: 1.625 V @ 0.4 ms. Configuration: auto threshold, bipolar.    Mode: DDDR Lower limit rate: 60 bpm Upper tracking rate: 120 bpm Max sensor rate: 120 bpm    Other: 16 VHR episodes- longest 1 min 21 sec, possible atrial arrhythmia     (02/08/2024, in-clinic)    General comments: Device interrogation and lead testing performed. Device WNL, leads stable. Presenting rhythm AP/ with occasional VS AMS x3, no EGM; HVR x 14. Available EGM' s reveal likely AT/SVT. Longest episode 3 minutes 27 seconds.    Reprogramming comments: No changes this session    Thresholds RA Lead: 1.5 V @ 1 ms. Configuration: unipolar. RV  Lead: 1 V @ 0.5 ms. Configuration: bipolar.    Mode: DDDR Lower limit rate: 60 bpm Upper tracking rate: 120 bpm    (01/22/2024, remote)    REMOTE transmission received and data reviewed. Device and leads WNL. Battery longevity 1.6 Atrial paced 63 % Ventricular paced 57 %    2 AMS- noise artifact noted 1 VHR-19 sec, AIVR noted    (09/07/2023, in clinic))    Mode: DDDR Lower limit rate: 75 bpm Upper tracking rate: 130 bpm    Reprogramming comments: No changes this session    General comments: Device interrogation and lead testing performed. Device and leads WNL. 59 HVR's    (06/14/2023)  REMOTE transmission received and data reviewed. Device and leads WNL. Battery longevity 2.0-2.6 yrs Atrial paced 40 % Ventricular paced 42 % 2 AMS- 1 EGM- Noise artifact noted 16 VHRs- Idioventricular rhythm with intermittent V-pacing      Catheterization:   (11/28/2023)  1. S,L,L TGA/VSD/tricuspid valve atresia s/p staged palliation with DKS/modified Fontan  2. Dilated lateral tunnel.  3. Good Fontan hemodynamics (CVp 14 mmHg, TPG 5 mmHg, PVR 2WU , CO 5 L/m)  4. Multiple small veno-venous collaterals.  5. Bilateral pulmonary micro-fistulae.  6. IART cardioverted to AV paced rhythm.      (07/07/2010)   1. Complex univentricular cardiac defect with superior inferior ventricles with double inlet left ventricle (atrioventricular connection not defined at catheterization), status post [place] connection and intracardiac lateral tunnel Fontan.   2. Moderate neoaortic (pulmonary) valve insufficiency.   3. Left aorta.   4. 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   5. Elevated central venous pressure (mean 19), transpulmonary gradient 4-5 mmHg.    Assessment / Plan:   Marcello Briggs is a 49 y.o. 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    His lead characteristics looked reasonable today, as in his prior visit, I do have some concern with the noise that was seen of how well his atrial lead actually works.  One factor that makes this more complicated is that the voltage of his atrial flutter waves are very low, and I suspect the device is undersensing the atrial arrhythmia, though high ventricular rate episodes likely suggest an atrial arrhythmias his case.  We will continue to follow this closely.    His hemodynamics from his last cath in 2023 were fairly reassuring.  He required a cardioversion as recent catheterization, and following this procedure he was loaded with sotalol.    At his visit with me in 9/2023, it was noted by he that he was off metoprolol (at least a month) and Sotalol (likely a couple years). His sotalol has been reloaded and   He notes that he has not had any interruption in taking this medication.  He does note that some of his other medications, including Eliquis and metoprolol, he stopped for a little while. Also extremely important is that he notes not using his CPAP for MIGUEL, particularly as many episodes have previously been noted to occur in the early morning  In the past.  He states he has not yet compliant with this.  He says he will order a new mask, and I also noted that I would be happy to refer him to sleep Medicine again if that were needed.  Both at this visit an earlier visits we extensively discussed the relationship of sleep apnea and atrial arrhythmias, which has been a significant issue for him in the past.  Proper treatment of sleep apnea would be an integral portion of both arrhythmia prevention and ideal care of someone with Fontan circulation.    Presenting rhythm was AP/VS with occasional . On closer inspection, it remains unclear due to very low voltage P-wave if patient is in atrial flutter vs sinus bradycardia, even with direct evaluation of the device and inpection of the 12-lead. Pacing at  AAI at various rates resulted in either VS beats with Wenckebach vs  no change in ventricular response with variable A:V conduction as the rhythm could have been atrial flutter. The most significant argument of him being in atrial flutter is that he has been pacing less in the ventricle (ventricular rate response has not been fast for him in thepast) as he previously predominatly paces the ventricle in sinus and less so in atrial flutter.     We reviewed the importance of being compliant with both his antiarrhythmic medications and CPAP.  It is possible that noncompliance significant contributor to this issue, but nevertheless his cardiac substrate is certainly a setup for atrial flutter.   As noted above, it can be very difficult to assess whether or not he is in atrial flutter, but I think he is likely in it. We have placed a ZIO monitor  To further assess, as this has been helpful in the past.  He has had multiple cardioversions in the past, and he at least reported to be on sotalol, which is a good medication for this arrhythmia.  Given that, the next chance for rhythm control would likely be ablation. We discussed the importance of getting back on his other antiarrhythmic medications, particularly Eliquis as there could be risk of stroke in atrial flutter while not on blood thinner.  Ultimately, if he appears to be predominantly in atrial flutter, I would recommend ablation.  We reviewed that this can be difficult given his Fontan physiology, but I think a trans baffle puncture, if required, can be done safely in the setting of a lateral tunnel Fontan.      Follow-up:     Pending ZIO monitor results  3 months remote transmission  At minimum 6 months clinic interrogation with ECG, echocardiogram, and 24hr heart rhythm monitor  Cardiac medications:    Digoxin  Enalapril  Lasix  Metoprolol XL  Sotalol   Warfarin  SBE:  Yes.  Antibiotic prophylaxis is required prior to all dental procedures cleanings.    Please  contact us if he has any questions or concerns.  Our clinic from his [phone] during office hours. For urgent night and weekend concerns, call [phone] and ask for the pediatric cardiologist on call to be paged.

## 2024-08-22 ENCOUNTER — OFFICE VISIT (OUTPATIENT)
Dept: CARDIOLOGY | Facility: CLINIC | Age: 49
End: 2024-08-22
Payer: COMMERCIAL

## 2024-08-22 ENCOUNTER — HOSPITAL ENCOUNTER (OUTPATIENT)
Dept: CARDIOLOGY | Facility: HOSPITAL | Age: 49
Discharge: HOME | End: 2024-08-22
Attending: PEDIATRICS
Payer: COMMERCIAL

## 2024-08-22 ENCOUNTER — HOSPITAL ENCOUNTER (OUTPATIENT)
Dept: CARDIOLOGY | Facility: CLINIC | Age: 49
Discharge: HOME | End: 2024-08-22
Payer: COMMERCIAL

## 2024-08-22 ENCOUNTER — PATIENT MESSAGE (OUTPATIENT)
Dept: CARDIOLOGY | Facility: CLINIC | Age: 49
End: 2024-08-22

## 2024-08-22 ENCOUNTER — HOSPITAL ENCOUNTER (OUTPATIENT)
Dept: PEDIATRIC CARDIOLOGY | Facility: HOSPITAL | Age: 49
Discharge: HOME | End: 2024-08-22
Attending: PEDIATRICS
Payer: COMMERCIAL

## 2024-08-22 VITALS
OXYGEN SATURATION: 85 % | HEART RATE: 60 BPM | WEIGHT: 166 LBS | DIASTOLIC BLOOD PRESSURE: 66 MMHG | HEIGHT: 63 IN | SYSTOLIC BLOOD PRESSURE: 124 MMHG | BODY MASS INDEX: 29.41 KG/M2

## 2024-08-22 VITALS
DIASTOLIC BLOOD PRESSURE: 66 MMHG | SYSTOLIC BLOOD PRESSURE: 124 MMHG | OXYGEN SATURATION: 85 % | HEIGHT: 63 IN | BODY MASS INDEX: 29.41 KG/M2 | HEART RATE: 60 BPM | WEIGHT: 166 LBS

## 2024-08-22 VITALS — WEIGHT: 166 LBS | HEIGHT: 63 IN | BODY MASS INDEX: 29.41 KG/M2

## 2024-08-22 DIAGNOSIS — Z95.0 CARDIAC PACEMAKER IN SITU: ICD-10-CM

## 2024-08-22 DIAGNOSIS — Q24.9 ADULT CONGENITAL HEART DISEASE: ICD-10-CM

## 2024-08-22 DIAGNOSIS — G47.33 OSA (OBSTRUCTIVE SLEEP APNEA): ICD-10-CM

## 2024-08-22 DIAGNOSIS — Q25.42 VSD (VENTRICULAR SEPTAL DEFECT AND AORTIC ARCH HYPOPLASIA: ICD-10-CM

## 2024-08-22 DIAGNOSIS — Q20.3 TRICUSPID ATRESIA WITH D-TRANSPOSITION OF GREAT ARTERIES: ICD-10-CM

## 2024-08-22 DIAGNOSIS — Q22.4 TRICUSPID ATRESIA WITH D-TRANSPOSITION OF GREAT ARTERIES: Primary | ICD-10-CM

## 2024-08-22 DIAGNOSIS — Q22.4 TRICUSPID ATRESIA WITH D-TRANSPOSITION OF GREAT ARTERIES: ICD-10-CM

## 2024-08-22 DIAGNOSIS — Z98.890 S/P FONTAN PROCEDURE: ICD-10-CM

## 2024-08-22 DIAGNOSIS — I50.9 HEART FAILURE DUE TO CONGENITAL HEART DISEASE: ICD-10-CM

## 2024-08-22 DIAGNOSIS — Q20.3 TRICUSPID ATRESIA WITH D-TRANSPOSITION OF GREAT ARTERIES: Primary | ICD-10-CM

## 2024-08-22 DIAGNOSIS — I49.5 SINUS NODE DYSFUNCTION: ICD-10-CM

## 2024-08-22 DIAGNOSIS — Q24.9 HEART FAILURE DUE TO CONGENITAL HEART DISEASE: ICD-10-CM

## 2024-08-22 DIAGNOSIS — I27.29 PULMONARY ARTERIAL HYPERTENSION ASSOCIATED WITH CONGENITAL HEART DISEASE: ICD-10-CM

## 2024-08-22 DIAGNOSIS — Q24.9 PULMONARY ARTERIAL HYPERTENSION ASSOCIATED WITH CONGENITAL HEART DISEASE: ICD-10-CM

## 2024-08-22 DIAGNOSIS — Q21.0 VSD (VENTRICULAR SEPTAL DEFECT AND AORTIC ARCH HYPOPLASIA: ICD-10-CM

## 2024-08-22 DIAGNOSIS — R23.0 CYANOSIS: ICD-10-CM

## 2024-08-22 DIAGNOSIS — I48.4 ATYPICAL ATRIAL FLUTTER: Primary | ICD-10-CM

## 2024-08-22 LAB
AV INDEX (PROSTH): 0.92
AV MEAN GRADIENT: 2 MMHG
AV PEAK GRADIENT: 4 MMHG
AV VALVE AREA BY VELOCITY RATIO: 3.5 CM²
AV VALVE AREA: 3.81 CM²
AV VELOCITY RATIO: 0.85
BSA FOR ECHO PROCEDURE: 1.83 M2
CV ECHO LV RWT: 0.23 CM
DOP CALC AO PEAK VEL: 0.94 M/S
DOP CALC AO VTI: 18.61 CM
DOP CALC LVOT AREA: 4.1 CM2
DOP CALC LVOT DIAMETER: 2.29 CM
DOP CALC LVOT PEAK VEL: 0.8 M/S
DOP CALC LVOT STROKE VOLUME: 70.85 CM3
DOP CALC RVOT PEAK VEL: 0.68 M/S
DOP CALC RVOT VTI: 13.7 CM
DOP CALCLVOT PEAK VEL VTI: 17.21 CM
ECHO LV POSTERIOR WALL: 0.95 CM (ref 0.6–1.1)
FRACTIONAL SHORTENING: 19 % (ref 28–44)
INTERVENTRICULAR SEPTUM: 0.98 CM (ref 0.6–1.1)
LA MAJOR: 7.43 CM
LA MINOR: 7.21 CM
LA WIDTH: 6.3 CM
LEFT ATRIUM SIZE: 5.23 CM
LEFT ATRIUM VOLUME INDEX MOD: 88.7 ML/M2
LEFT ATRIUM VOLUME INDEX: 114.5 ML/M2
LEFT ATRIUM VOLUME MOD: 158.76 CM3
LEFT ATRIUM VOLUME: 204.96 CM3
LEFT INTERNAL DIMENSION IN SYSTOLE: 6.8 CM (ref 2.1–4)
LEFT VENTRICLE DIASTOLIC VOLUME INDEX: 217.12 ML/M2
LEFT VENTRICLE DIASTOLIC VOLUME: 388.65 ML
LEFT VENTRICLE MASS INDEX: 239 G/M2
LEFT VENTRICLE SYSTOLIC VOLUME INDEX: 133.5 ML/M2
LEFT VENTRICLE SYSTOLIC VOLUME: 239 ML
LEFT VENTRICULAR INTERNAL DIMENSION IN DIASTOLE: 8.44 CM (ref 3.5–6)
LEFT VENTRICULAR MASS: 428.2 G
MV PEAK E VEL: 0.57 M/S
OHS QRS DURATION: 112 MS
OHS QTC CALCULATION: 296 MS
PV MEAN GRADIENT: 1 MMHG
PV PEAK GRADIENT: 3
PV PEAK VELOCITY: 0.9 M/S
SINUS: 3.82 CM
STJ: 2.53 CM
Z-SCORE OF LEFT VENTRICULAR DIMENSION IN END DIASTOLE: 5.34
Z-SCORE OF LEFT VENTRICULAR DIMENSION IN END SYSTOLE: 6.32

## 2024-08-22 PROCEDURE — 93010 ELECTROCARDIOGRAM REPORT: CPT | Mod: S$GLB,,, | Performed by: INTERNAL MEDICINE

## 2024-08-22 PROCEDURE — 93242 EXT ECG>48HR<7D RECORDING: CPT

## 2024-08-22 PROCEDURE — 3078F DIAST BP <80 MM HG: CPT | Mod: CPTII,S$GLB,, | Performed by: PEDIATRICS

## 2024-08-22 PROCEDURE — 3008F BODY MASS INDEX DOCD: CPT | Mod: CPTII,S$GLB,, | Performed by: PEDIATRICS

## 2024-08-22 PROCEDURE — 4010F ACE/ARB THERAPY RXD/TAKEN: CPT | Mod: CPTII,S$GLB,, | Performed by: PEDIATRICS

## 2024-08-22 PROCEDURE — 93005 ELECTROCARDIOGRAM TRACING: CPT | Mod: S$GLB,,, | Performed by: PEDIATRICS

## 2024-08-22 PROCEDURE — 93303 ECHO TRANSTHORACIC: CPT | Mod: 26,,, | Performed by: PEDIATRICS

## 2024-08-22 PROCEDURE — 99999 PR PBB SHADOW E&M-EST. PATIENT-LVL III: CPT | Mod: PBBFAC,,, | Performed by: PEDIATRICS

## 2024-08-22 PROCEDURE — 3074F SYST BP LT 130 MM HG: CPT | Mod: CPTII,S$GLB,, | Performed by: PEDIATRICS

## 2024-08-22 PROCEDURE — 93303 ECHO TRANSTHORACIC: CPT

## 2024-08-22 PROCEDURE — 99214 OFFICE O/P EST MOD 30 MIN: CPT | Mod: S$GLB,,, | Performed by: PEDIATRICS

## 2024-08-22 PROCEDURE — 99215 OFFICE O/P EST HI 40 MIN: CPT | Mod: S$GLB,,, | Performed by: PEDIATRICS

## 2024-08-22 PROCEDURE — 93325 DOPPLER ECHO COLOR FLOW MAPG: CPT

## 2024-08-22 PROCEDURE — G2211 COMPLEX E/M VISIT ADD ON: HCPCS | Mod: S$GLB,,, | Performed by: PEDIATRICS

## 2024-08-22 PROCEDURE — 93325 DOPPLER ECHO COLOR FLOW MAPG: CPT | Mod: 26,,, | Performed by: PEDIATRICS

## 2024-08-22 PROCEDURE — 1159F MED LIST DOCD IN RCRD: CPT | Mod: CPTII,S$GLB,, | Performed by: PEDIATRICS

## 2024-08-22 PROCEDURE — 93320 DOPPLER ECHO COMPLETE: CPT | Mod: 26,,, | Performed by: PEDIATRICS

## 2024-08-22 NOTE — PROGRESS NOTES
"08/22/2024    Patient Name: LEVON GANDHI  YOB: 1975  [facility] Number: [id number]    Kevin Chaves MD  [address]  [facility] [address]    MIRIAM Garcia MD  [address]   [address]  [place] [address]    Dear Dr. Chaves and Jose:    It is a pleasure to see Levon at our main campus adult congenital cardiology clinic to evaluate tricuspid atresia.    Levon is a 49 y.o. Male with a complex past medical history. To summarize, his diagnoses are as follows:  1. 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   - History of a classic Roger shunt and a 12 mm conduit from the right atrium to the left pulmonary artery along with a Damus Judy Stansel operation    - now with mild native and brigette-aortic insufficiency   - lateral tunnel Fontan with a 16 mm ring Goretex graft from the left pulmonary artery to the right pulmonary artery.   - elevated central venous pressures with mild desaturation at least partially due to collateral vessels - improved after cath 2010    - mild mitral regurgitation    - Last liver US September 2020  - excellent hemodynamics on cath November 2023  2. 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  Loaded on sotalol.   - unclear if currently in atrial flutter  3. Varicose veins followed in the past by vascular Medicine.  Improved pain and swelling.  Of note, the patient was accessed via the right femoral vein without difficulty at the 2010 catheterization.  4. No evidence of protein-losing enteropathy.    5. obstructive sleep apnea, noncompliant with CPAP  6.  Noncompliant with coumadin INR monitoring  7.  Low " normal to mildly decreased systemic ventricular function    Discussion:  For a 49-year-old Fontan, he looks great.  His cath last year looked great (surprisingly so) despite being in atrial arrhythmia.  I ordered the catheterization primarily to measure the transpulmonary gradient, to see if we should consider adding bosentan or mass attention to his phosphodiesterase inhibitor.  His CVP is 15 which is very good for a Fontan of his age, in the transpulmonary gradient was reassuring.      Eliquis is a great medication for him.  He was not compliant with INR checks in the past, and he clearly needs to be on more anticoagulation than just a baby aspirin.  I encouraged he restart that medication, especially given concerns about the rhythm.      He is seen electrophysiology for his arrhythmia today.    My plan is as follows:  1.  CPAP for obstructive sleep apnea - he agrees to restart treatment  2.  Continue current medications - restart eliquis  3.  EP visit today  4.  Regular follow-up with hepatology   5.  follow up with echo, ekg, me and EP 6 months.  6.  Regular dental care.  I would recommend endocarditis prophylaxis before dental work.  7.  Check labs today.    Interval history:     Overall, he feels like he is basically unchanged.  His major complaint is orthopedic.  He gets a lot of lower back pain and leg pain.  He has learned how to crack his back, which helps him sleep more easily.  No worsening of his mild lower extremity edema and his significant varicosities.  No worsening shortness of breath.  He denies palpitations.  No syncope or near-syncope.  No productive cough or diarrhea.  He has been taking his sotalol, but otherwise, he has not taken his other medications because he has new insurance.  He has an insurance card now, and he is going to go to the pharmacy and get refills.  He has not been using his CPAP.  He needs a larger mask, and he has been putting off ordering it.  He agrees to order that  mask now.  Past Medical History:   Diagnosis Date    Asthma     Atrial flutter     Cyanosis     Heart murmur     Intra-atrial reentrant tachycardia     MIGUEL (obstructive sleep apnea) 10/23/2014    TGA (transposition of great arteries)     Tricuspid atresia     VSD (ventricular septal defect)      Past Surgical History:   Procedure Laterality Date    ANGIOGRAM, PULMONARY, PEDIATRIC  11/28/2023    Procedure: Angiogram, Pulmonary, Pediatric;  Surgeon: Raza Lester Jr., MD;  Location: [facility] CATH LAB;  Service: Cardiology;;    AORTOGRAM, PEDIATRIC  11/28/2023    Procedure: Aortogram, Pediatric;  Surgeon: Raza Lester Jr., MD;  Location: [facility] CATH LAB;  Service: Cardiology;;    CARDIAC CATHETERIZATION      CARDIOVERSION      COMBINED RIGHT AND RETROGRADE LEFT HEART CATHETERIZATION FOR CONGENITAL HEART DEFECT N/A 11/28/2023    Procedure: Catheterization, Heart, Combined Right and Retrograde Left, for Congenital Heart Defect;  Surgeon: Raza Lester Jr., MD;  Location: [facility] CATH LAB;  Service: Cardiology;  Laterality: N/A;    ECHOCARDIOGRAM,TRANSESOPHAGEAL N/A 10/5/2023    Procedure: Transesophageal echo (JUNIE) intra-procedure log documentation;  Surgeon: Chang Dutton MD;  Location: [facility] EP LAB;  Service: Cardiology;  Laterality: N/A;    FONTAN PROCEDURE, EXTRACARDIAC      ICE, PERFORMED  11/28/2023    Procedure: ICE, Performed;  Surgeon: Raza Lester Jr., MD;  Location: [facility] CATH LAB;  Service: Cardiology;;    RADIOFREQUENCY ABLATION      REPLACEMENT OF PACEMAKER Right 10/08/2020    Procedure: REPLACEMENT-PACEMAKER GENERATOR;  Surgeon: Chang Garcia MD;  Location: [facility] OR [address] [place] [address];  Service: Cardiovascular;  Laterality: Right;    TREATMENT OF CARDIAC ARRHYTHMIA N/A 10/5/2023    Procedure: CARDIOVERSION;  Surgeon: Weiland, Michael D. Jr., MD;  Location: [facility] EP LAB;  Service: Cardiology;  Laterality: N/A;  Adult congenital heart; Fontan; AF, JUNIE, DCCV, ADRIANA BARROSO/ M. Weiland - will go to [facility] cath  prep    VENOGRAM, CATH LAB  11/28/2023    Procedure: Venogram, Cath Lab;  Surgeon: Raza Lester Jr., MD;  Location: [facility] CATH LAB;  Service: Cardiology;;     Family History   Problem Relation Name Age of Onset    No Known Problems Mother      No Known Problems Father      No Known Problems Sister      No Known Problems Brother      No Known Problems Maternal Grandmother      Heart disease Maternal Grandfather      No Known Problems Paternal Grandmother      Diabetes Paternal Grandfather      No Known Problems Maternal Aunt      No Known Problems Maternal Uncle      Diabetes Paternal Aunt      No Known Problems Paternal Uncle      Atrial Septal Defect Neg Hx      Anemia Neg Hx      Arrhythmia Neg Hx      Asthma Neg Hx      Clotting disorder Neg Hx      Fainting Neg Hx      Heart attack Neg Hx      Heart failure Neg Hx      Hyperlipidemia Neg Hx      Hypertension Neg Hx      Stroke Neg Hx       Social History     Socioeconomic History    Marital status: Legally    Tobacco Use    Smoking status: Never    Smokeless tobacco: Never   Substance and Sexual Activity    Alcohol use: No    Drug use: No   Social History Narrative    He is working at [employer].     Social Determinants of Health     Financial Resource Strain: Low Risk  (11/29/2023)    Overall Financial Resource Strain (CARDIA)     Difficulty of Paying Living Expenses: Not hard at all   Recent Concern: Financial Resource Strain - Medium Risk (11/28/2023)    Overall Financial Resource Strain (CARDIA)     Difficulty of Paying Living Expenses: Somewhat hard   Food Insecurity: No Food Insecurity (11/29/2023)    Hunger Vital Sign     Worried About Running Out of Food in the Last Year: Never true     Ran Out of Food in the Last Year: Never true   Recent Concern: Food Insecurity - Food Insecurity Present (11/28/2023)    Hunger Vital Sign     Worried About Running Out of Food in the Last Year: Sometimes true     Ran Out of Food in the Last Year: Sometimes  true   Transportation Needs: No Transportation Needs (11/29/2023)    PRAPARE - Transportation     Lack of Transportation (Medical): No     Lack of Transportation (Non-Medical): No   Physical Activity: Inactive (11/29/2023)    Exercise Vital Sign     Days of Exercise per Week: 0 days     Minutes of Exercise per Session: 0 min   Stress: No Stress Concern Present (11/29/2023)    [employer] - Occupational Stress Questionnaire     Feeling of Stress : Not at all   Housing Stability: Low Risk  (11/29/2023)    Housing Stability Vital Sign     Unable to Pay for Housing in the Last Year: No     Number of Places Lived in the Last Year: 1     Unstable Housing in the Last Year: No   Recent Concern: Housing Stability - High Risk (11/28/2023)    Housing Stability Vital Sign     Unable to Pay for Housing in the Last Year: Yes     Number of Places Lived in the Last Year: 1     Unstable Housing in the Last Year: No     Current Outpatient Medications on File Prior to Visit   Medication Sig Dispense Refill    apixaban (ELIQUIS) 5 mg Tab Take 1 tablet (5 mg total) by mouth 2 (two) times daily. 60 tablet 11    digoxin (LANOXIN) 250 mcg tablet TAKE 1 TABLET(0.25 MG) BY MOUTH EVERY MORNING 90 tablet 3    enalapril (VASOTEC) 10 MG tablet Take 1 tablet (10 mg total) by mouth 2 (two) times daily. 60 tablet 3    furosemide (LASIX) 20 MG tablet Take 1 tablet (20 mg total) by mouth once daily. (Patient taking differently: Take 20 mg by mouth once daily. Per pt medication is taken on an as needed basis.) 30 tablet 11    metoprolol succinate (TOPROL-XL) 100 MG 24 hr tablet Take 3 tablets (300 mg total) by mouth once daily. 270 tablet 3    sildenafil (REVATIO) 20 mg Tab Take 1 tablet (20 mg total) by mouth 2 (two) times daily. 60 tablet 3    sotaloL (BETAPACE) 80 MG tablet Take 1 tablet (80 mg total) by mouth 2 (two) times daily. 60 tablet 11     No current facility-administered medications on file prior to visit.  "    Review of patient's allergies indicates:  No Known Allergies     Vitals:    08/22/24 0959   BP: 124/66   BP Location: Right arm   Patient Position: Sitting   Pulse: 60   SpO2: (!) 85%   Weight: 75.3 kg (166 lb 0.1 oz)   Height: 5' 2.99" (1.6 m)     /66 (BP Location: Right arm, Patient Position: Sitting)   Pulse 60   Ht 5' 2.99" (1.6 m)   Wt 75.3 kg (166 lb 0.1 oz)   SpO2 (!) 85%   BMI 29.41 kg/m²   In general, his baseline ruddiness is stable.  He is a male in no apparent distress. Head is normocephalic and atraumatic. The eyes, nares, and oropharynx are clear.  No evidence of any infection in his mouth.  The neck is supple without obvious jugular venous distention or lymphadenopathy, and his face does not look swollen. Lungs are clear to auscultation bilaterally. The chest is symmetrical. Multiple well-healed surgical scars are noted. The pacemaker is palpated in the right upper chest. The area is nontender without evidence of infection. The precordium is quiet. First heart sound is normal. A loud single second heart sound is auscultated. A grade 2/6 systolic ejection murmur is auscultated. No diastolic murmurs or gallops are heard. The abdominal exam reveals a liver edge palpated about 2 cm below the right costal margin. It is not pulsatile. He abdomen is soft and nontender without evidence of ascites. I could not palpate his spleen. There is no significant clubbing and no obvious cyanosis. He has good pulses in his legs bilaterally. There is trivial edema right leg up to about the mid shin. He does have varicose veins.  No palpable cords or calf tenderness.    I personally reviewed the following studies:  Results for orders placed during the hospital encounter of 08/22/24    Echo    Interpretation Summary  CONGENITAL CARDIAC HISTORY:  Tricuspid atresia, hypoplastic right ventricle with ventricular septal defect and transposition of the great arteries.  INTERVENTION:  S/P Russell and " lateral tunnel Fontan.  S/P Coil and Amplatzer occlusion of venovenous collaterals (detailed description of anatomy in cath report July 2010).    SEGMENTAL CARDIAC CONNECTIONS (previously demonstrated):  Abdominal situs is solitus.  Atrial situs is normal.  Tricuspid atresia.  Abnormal mitral valve with tricuspid atresia.  Very dilated, globular left ventricle with very hypoplastic right ventricle.  D-transposed great vessels.  Aortic valve is normal and pulmonic valve is normal. D-loop.  Cardiac position is Levocardia.      IMPRESSION -  Technically difficult imaging -  Images consistent with tricuspid atresia, d-TGA, VSD and RV hypoplasia S/P DKS and lateral tunnel Fontan.  Normal intrahepatic inferior vena cava and hepatic veins joining lateral tunnel Fontan and slow moving spontaneous contrast, laminar flow and no obvious thrombus in IVC, hepatic veins, proximal lateral tunnel.  Unable to demonstrate remaining segments lateral tunnel Fontan and Fontan anastomosis.  Color Doppler demonstrates unobstructed flow in the right superior vena cava and at Roger anastomosis with limited imaging of the pulmonary confluence.  The proximal pulmonary branches are not demonstrated.  Small functional right atrium with unrestricted bidirectional flow at atrial septum.  Limited images demonstrating no obvious obstruction of pulmonary venous return to the dilated left atrium.  The left atrial volume index is severely enlarged measuring 89 ml/m2.  Large mitral annulus.  Mild to moderate mitral valve insufficiency.  Dilated left ventricle - severe.  Qualitatively mildly diminished left (single) ventricle systolic function with EF estimated 45-50% from apical views.  Limited images demonstrate unrestricted flow at VSD to anterior native aortic valve and small subaortic chamber (Right ventricle).  Native aortic valve with no stenosis and mild insufficiency.  Posteriorly positioned, mildly sclerotic, trileaflet neoaortic valve  committed to the left ventricle with no left ventricular outflow obstruction, mild insufficiency and no valvar stenosis.  Good imaging of the DKS anastomosis with no evidence of obstruction in either limb.  Technically difficult suprasternal imaging of the aorta demonstrates left aortic arch branching with no evidence of coarctation.  No pericardial effusion.      Cath 11/28/23:  IMPRESSION:  1. S,L,L TGA/VSD/tricuspid valve atresia s/p staged palliation with DKS/modified Fontan  2. Dilated lateral tunnel.  3. Good Fontan hemodynamics (CVp 14 mmHg, TPG 5 mmHg, PVR 2WU , CO 5 L/m)  4. Multiple small veno-venous collaterals.  5. Bilateral pulmonary micro-fistulae.  6. IART cardioverted to AV paced rhythm.        Lab Results   Component Value Date    WBC 4.87 12/01/2023    HGB 16.2 12/01/2023    HCT 47.9 12/01/2023    MCV 93 12/01/2023    PLT 86 (L) 12/01/2023       CMP  Sodium   Date Value Ref Range Status   12/01/2023 138 136 - 145 mmol/L Final     Potassium   Date Value Ref Range Status   12/01/2023 4.3 3.5 - 5.1 mmol/L Final     Chloride   Date Value Ref Range Status   12/01/2023 106 95 - 110 mmol/L Final     CO2   Date Value Ref Range Status   12/01/2023 25 23 - 29 mmol/L Final     Glucose   Date Value Ref Range Status   12/01/2023 85 70 - 110 mg/dL Final     BUN   Date Value Ref Range Status   12/01/2023 17 6 - 20 mg/dL Final     Creatinine   Date Value Ref Range Status   12/01/2023 0.9 0.5 - 1.4 mg/dL Final     Calcium   Date Value Ref Range Status   12/01/2023 8.8 8.7 - 10.5 mg/dL Final     Total Protein   Date Value Ref Range Status   11/28/2023 7.1 6.0 - 8.4 g/dL Final     Albumin   Date Value Ref Range Status   11/28/2023 4.0 3.5 - 5.2 g/dL Final     Total Bilirubin   Date Value Ref Range Status   11/28/2023 2.1 (H) 0.1 - 1.0 mg/dL Final     Comment:     For infants and newborns, interpretation of results should be based  on gestational age, weight and in agreement with clinical  observations.    Premature  Infant recommended reference ranges:  Up to 24 hours.............<8.0 mg/dL  Up to 48 hours............<12.0 mg/dL  3-5 days..................<15.0 mg/dL  6-29 days.................<15.0 mg/dL       Alkaline Phosphatase   Date Value Ref Range Status   11/28/2023 100 55 - 135 U/L Final     AST   Date Value Ref Range Status   11/28/2023 24 10 - 40 U/L Final     ALT   Date Value Ref Range Status   11/28/2023 22 10 - 44 U/L Final     Anion Gap   Date Value Ref Range Status   12/01/2023 7 (L) 8 - 16 mmol/L Final     eGFR   Date Value Ref Range Status   12/01/2023 >60.0 >60 mL/min/1.73 m^2 Final      Latest Reference Range & Units Most Recent   Iron 45 - 160 ug/dL 159  9/7/23 11:43   TIBC 250 - 450 ug/dL 425  9/7/23 11:43   Saturated Iron 20 - 50 % 37  9/7/23 11:43   UIBC 110 - 370 ug/dl 325  1/31/13 15:19   Transferrin 200 - 375 mg/dL 287  9/7/23 11:43   Ferritin 20.0 - 300.0 ng/mL 125  9/7/23 11:43   Protime 9.0 - 12.5 sec 14.0 (H)  9/7/23 11:43   INR 0.8 - 1.2  1.3 (H)  9/7/23 11:43   (H): Data is abnormally high    GGT   Date Value Ref Range Status   09/07/2023 122 (H) 8 - 55 U/L Final     AFP   Date Value Ref Range Status   09/07/2023 2.3 0.0 - 8.4 ng/mL Final     Comment:     The testing method is a chemiluminescent microparticle immunoassay   manufactured by [employer] and performed on the [employer]   or   Anac[employer] system. Values obtained with different assay manufacturers   for   methods may be different and cannot be used interchangeably.       BNP   Date Value Ref Range Status   09/07/2023 243 (H) 0 - 99 pg/mL Final     Comment:     Values of less than 100 pg/ml are consistent with non-CHF populations.     TSH   Date Value Ref Range Status   09/07/2023 1.918 0.400 - 4.000 uIU/mL Final     LE US 9/10/20:  No evidence of right lower extremity DVT.  No evidence of left lower extremity DVT.  Right greater saphenous superficial vein reflux is present.  Right common and deep femoral vein reflux is  present.    Thank you for referring this patient to our clinic. Please call with any questions.    Sincerely,        Dereje Acosta MD  Pediatric Cardiology  Adult Congenital Heart Disease  Pediatric Heart Failure and Transplantation  [facility] [address] [place]  [address]  [phone]

## 2024-08-27 DIAGNOSIS — Q24.9 PULMONARY ARTERIAL HYPERTENSION ASSOCIATED WITH CONGENITAL HEART DISEASE: ICD-10-CM

## 2024-08-27 DIAGNOSIS — I27.29 PULMONARY ARTERIAL HYPERTENSION ASSOCIATED WITH CONGENITAL HEART DISEASE: ICD-10-CM

## 2024-08-27 DIAGNOSIS — Z98.890 S/P FONTAN PROCEDURE: ICD-10-CM

## 2024-08-28 RX ORDER — ENALAPRIL MALEATE 10 MG/1
10 TABLET ORAL 2 TIMES DAILY
Qty: 180 TABLET | Refills: 1 | Status: SHIPPED | OUTPATIENT
Start: 2024-08-28

## 2024-08-28 RX ORDER — DIGOXIN 250 MCG
250 TABLET ORAL DAILY
Qty: 90 TABLET | Refills: 1 | Status: SHIPPED | OUTPATIENT
Start: 2024-08-28

## 2024-08-28 RX ORDER — SILDENAFIL CITRATE 20 MG/1
20 TABLET ORAL 2 TIMES DAILY
Qty: 180 TABLET | Refills: 1 | Status: SHIPPED | OUTPATIENT
Start: 2024-08-28

## 2024-11-18 ENCOUNTER — HOSPITAL ENCOUNTER (OUTPATIENT)
Dept: PEDIATRIC CARDIOLOGY | Facility: HOSPITAL | Age: 49
Discharge: HOME | End: 2024-11-18
Attending: PEDIATRICS
Payer: COMMERCIAL

## 2024-11-18 DIAGNOSIS — Q21.0 VSD (VENTRICULAR SEPTAL DEFECT AND AORTIC ARCH HYPOPLASIA: ICD-10-CM

## 2024-11-18 DIAGNOSIS — Z95.0 PACEMAKER: ICD-10-CM

## 2024-11-18 DIAGNOSIS — Z98.890 S/P FONTAN PROCEDURE: ICD-10-CM

## 2024-11-18 DIAGNOSIS — Q25.42 VSD (VENTRICULAR SEPTAL DEFECT AND AORTIC ARCH HYPOPLASIA: ICD-10-CM

## 2024-11-18 DIAGNOSIS — Q20.3 TRICUSPID ATRESIA WITH D-TRANSPOSITION OF GREAT ARTERIES: ICD-10-CM

## 2024-11-18 DIAGNOSIS — Q24.9 ADULT CONGENITAL HEART DISEASE: ICD-10-CM

## 2024-11-18 DIAGNOSIS — I48.4 ATYPICAL ATRIAL FLUTTER: ICD-10-CM

## 2024-11-18 DIAGNOSIS — I49.9 VENTRICULAR ARRHYTHMIA: ICD-10-CM

## 2024-11-18 DIAGNOSIS — Q24.9 HEART FAILURE DUE TO CONGENITAL HEART DISEASE: ICD-10-CM

## 2024-11-18 DIAGNOSIS — Q22.4 TRICUSPID ATRESIA WITH D-TRANSPOSITION OF GREAT ARTERIES: ICD-10-CM

## 2024-11-18 DIAGNOSIS — I49.5 SINUS NODE DYSFUNCTION: ICD-10-CM

## 2024-11-18 DIAGNOSIS — I50.9 HEART FAILURE DUE TO CONGENITAL HEART DISEASE: ICD-10-CM

## 2024-11-18 PROCEDURE — 93296 REM INTERROG EVL PM/IDS: CPT

## 2024-11-19 DIAGNOSIS — Q21.0 VSD (VENTRICULAR SEPTAL DEFECT AND AORTIC ARCH HYPOPLASIA: ICD-10-CM

## 2024-11-19 DIAGNOSIS — Q20.3 TRICUSPID ATRESIA WITH D-TRANSPOSITION OF GREAT ARTERIES: Primary | ICD-10-CM

## 2024-11-19 DIAGNOSIS — Q25.42 VSD (VENTRICULAR SEPTAL DEFECT AND AORTIC ARCH HYPOPLASIA: ICD-10-CM

## 2024-11-19 DIAGNOSIS — I49.5 SINUS NODE DYSFUNCTION: ICD-10-CM

## 2024-11-19 DIAGNOSIS — I49.9 VENTRICULAR ARRHYTHMIA: ICD-10-CM

## 2024-11-19 DIAGNOSIS — I50.9 HEART FAILURE DUE TO CONGENITAL HEART DISEASE: ICD-10-CM

## 2024-11-19 DIAGNOSIS — Z95.0 PACEMAKER: ICD-10-CM

## 2024-11-19 DIAGNOSIS — I48.4 ATYPICAL ATRIAL FLUTTER: ICD-10-CM

## 2024-11-19 DIAGNOSIS — Q24.9 HEART FAILURE DUE TO CONGENITAL HEART DISEASE: ICD-10-CM

## 2024-11-19 DIAGNOSIS — Q24.9 ADULT CONGENITAL HEART DISEASE: ICD-10-CM

## 2024-11-19 DIAGNOSIS — Q22.4 TRICUSPID ATRESIA WITH D-TRANSPOSITION OF GREAT ARTERIES: Primary | ICD-10-CM

## 2024-11-22 LAB
AV DELAY - LONGEST: 200 MSEC
BATTERY VOLTAGE (V): 2.9 V
ERI (V): 2.6 V
IMPEDANCE RA LEAD (NATIVE): 310 OHMS
IMPEDANCE RA LEAD: 340 OHMS
OHS CV DC PP MS1: 1 MS
OHS CV DC PP MS2: 0.5 MS
OHS CV DC PP V1: 3 V
OHS CV DC PP V2: 3 V
P/R-WAVE RA LEAD (NATIVE): 8.8 MV
PV DELAY - LONGEST: 200 MSEC

## 2024-12-06 ENCOUNTER — PATIENT MESSAGE (OUTPATIENT)
Dept: CARDIOLOGY | Facility: CLINIC | Age: 49
End: 2024-12-06

## 2024-12-06 DIAGNOSIS — Z95.0 CARDIAC PACEMAKER IN SITU: ICD-10-CM

## 2024-12-06 DIAGNOSIS — Q24.9 ADULT CONGENITAL HEART DISEASE: Primary | ICD-10-CM

## 2024-12-06 DIAGNOSIS — Q20.3 TRICUSPID ATRESIA WITH D-TRANSPOSITION OF GREAT ARTERIES: ICD-10-CM

## 2024-12-06 DIAGNOSIS — Q21.0 VSD (VENTRICULAR SEPTAL DEFECT AND AORTIC ARCH HYPOPLASIA: ICD-10-CM

## 2024-12-06 DIAGNOSIS — Z98.890 S/P FONTAN PROCEDURE: ICD-10-CM

## 2024-12-06 DIAGNOSIS — Q25.42 VSD (VENTRICULAR SEPTAL DEFECT AND AORTIC ARCH HYPOPLASIA: ICD-10-CM

## 2024-12-06 DIAGNOSIS — Q22.4 TRICUSPID ATRESIA WITH D-TRANSPOSITION OF GREAT ARTERIES: ICD-10-CM

## 2024-12-06 RX ORDER — METOPROLOL SUCCINATE 100 MG/1
TABLET, EXTENDED RELEASE ORAL
Qty: 270 TABLET | Refills: 3 | Status: SHIPPED | OUTPATIENT
Start: 2024-12-06

## 2024-12-18 ENCOUNTER — CARDIOLOGY CONFERENCE (OUTPATIENT)
Dept: CARDIOLOGY | Facility: CLINIC | Age: 49
End: 2024-12-18

## 2024-12-19 NOTE — PROGRESS NOTES
Marcello Briggs is a 48 y/o whose plan of care was discussed in the biweekly [facility] conference today. All team members were present, including [facility] cardiologists, congenital cardiac anesthesiologists, CV surgeons, interventional cardiologists and the [facility] , PA, and nurse navigator.      Hx of a lateral tunnel fontan with recurrent A flutter. Followed by Dave and Ashutosh. Purpose of discussion was to consider ablation.    Marcello has a transvenous pacemaker. He has been in aflutter in the past and didn't realize it, also some NSVT. Have tried sotolol and still has break thru arrhythmias. He was cathed a year ago. Fontan working well and he works approx. 60 hrs a week on his feet.    Plan is to take to the lab to do an EP study. If Ashutosh can't access via baffle may need Inocencia and Trevon's help in the cath lab to cross over.    Next steps:    Ashutosh will coordinate EP study with cath team. Ashutosh will call Marcello to discuss plan.

## 2025-01-06 DIAGNOSIS — I27.29 PULMONARY ARTERIAL HYPERTENSION ASSOCIATED WITH CONGENITAL HEART DISEASE: ICD-10-CM

## 2025-01-06 DIAGNOSIS — Z98.890 S/P FONTAN PROCEDURE: ICD-10-CM

## 2025-01-06 DIAGNOSIS — Q24.9 PULMONARY ARTERIAL HYPERTENSION ASSOCIATED WITH CONGENITAL HEART DISEASE: ICD-10-CM

## 2025-01-06 RX ORDER — METOPROLOL SUCCINATE 100 MG/1
TABLET, EXTENDED RELEASE ORAL
Qty: 270 TABLET | Refills: 3 | Status: CANCELLED | OUTPATIENT
Start: 2025-01-06

## 2025-01-07 RX ORDER — DIGOXIN 250 MCG
250 TABLET ORAL DAILY
Qty: 90 TABLET | Refills: 0 | Status: SHIPPED | OUTPATIENT
Start: 2025-01-07

## 2025-01-07 RX ORDER — SILDENAFIL CITRATE 20 MG/1
20 TABLET ORAL 2 TIMES DAILY
Qty: 180 TABLET | Refills: 0 | Status: SHIPPED | OUTPATIENT
Start: 2025-01-07

## 2025-02-17 ENCOUNTER — HOSPITAL ENCOUNTER (OUTPATIENT)
Dept: PEDIATRIC CARDIOLOGY | Facility: HOSPITAL | Age: 50
Discharge: HOME | End: 2025-02-17
Attending: PEDIATRICS

## 2025-02-17 DIAGNOSIS — I49.5 SINUS NODE DYSFUNCTION: ICD-10-CM

## 2025-02-17 DIAGNOSIS — I49.9 VENTRICULAR ARRHYTHMIA: ICD-10-CM

## 2025-02-17 DIAGNOSIS — Q24.9 ADULT CONGENITAL HEART DISEASE: ICD-10-CM

## 2025-02-17 DIAGNOSIS — Q25.42 VSD (VENTRICULAR SEPTAL DEFECT AND AORTIC ARCH HYPOPLASIA: ICD-10-CM

## 2025-02-17 DIAGNOSIS — Q24.9 HEART FAILURE DUE TO CONGENITAL HEART DISEASE: ICD-10-CM

## 2025-02-17 DIAGNOSIS — I48.4 ATYPICAL ATRIAL FLUTTER: ICD-10-CM

## 2025-02-17 DIAGNOSIS — Q21.0 VSD (VENTRICULAR SEPTAL DEFECT AND AORTIC ARCH HYPOPLASIA: ICD-10-CM

## 2025-02-17 DIAGNOSIS — I50.9 HEART FAILURE DUE TO CONGENITAL HEART DISEASE: ICD-10-CM

## 2025-02-17 DIAGNOSIS — Q22.4 TRICUSPID ATRESIA WITH D-TRANSPOSITION OF GREAT ARTERIES: ICD-10-CM

## 2025-02-17 DIAGNOSIS — Z95.0 PACEMAKER: ICD-10-CM

## 2025-02-17 DIAGNOSIS — Q20.3 TRICUSPID ATRESIA WITH D-TRANSPOSITION OF GREAT ARTERIES: ICD-10-CM

## 2025-02-17 PROCEDURE — 93296 REM INTERROG EVL PM/IDS: CPT

## 2025-02-21 LAB
AV DELAY - LONGEST: 200 MSEC
BATTERY VOLTAGE (V): 2.89 V
ERI (V): 2.6 V
IMPEDANCE RA LEAD (NATIVE): 310 OHMS
IMPEDANCE RA LEAD: 310 OHMS
OHS CV DC PP MS1: 1 MS
OHS CV DC PP MS2: 0.5 MS
OHS CV DC PP V1: 3 V
OHS CV DC PP V2: 3 V
P/R-WAVE RA LEAD (NATIVE): 8.3 MV
PV DELAY - LONGEST: 200 MSEC

## 2025-05-16 ENCOUNTER — PATIENT MESSAGE (OUTPATIENT)
Dept: PEDIATRIC CARDIOLOGY | Facility: CLINIC | Age: 50
End: 2025-05-16

## 2025-05-19 ENCOUNTER — HOSPITAL ENCOUNTER (OUTPATIENT)
Dept: PEDIATRIC CARDIOLOGY | Facility: HOSPITAL | Age: 50
Discharge: HOME | End: 2025-05-19
Attending: PEDIATRICS

## 2025-05-19 DIAGNOSIS — Z95.0 PACEMAKER: ICD-10-CM

## 2025-05-19 DIAGNOSIS — Q20.3 TRICUSPID ATRESIA WITH D-TRANSPOSITION OF GREAT ARTERIES: ICD-10-CM

## 2025-05-19 DIAGNOSIS — I49.9 VENTRICULAR ARRHYTHMIA: ICD-10-CM

## 2025-05-19 DIAGNOSIS — Q24.9 ADULT CONGENITAL HEART DISEASE: ICD-10-CM

## 2025-05-19 DIAGNOSIS — Q25.42 VSD (VENTRICULAR SEPTAL DEFECT AND AORTIC ARCH HYPOPLASIA: ICD-10-CM

## 2025-05-19 DIAGNOSIS — I49.5 SINUS NODE DYSFUNCTION: ICD-10-CM

## 2025-05-19 DIAGNOSIS — Q24.9 HEART FAILURE DUE TO CONGENITAL HEART DISEASE: ICD-10-CM

## 2025-05-19 DIAGNOSIS — Q22.4 TRICUSPID ATRESIA WITH D-TRANSPOSITION OF GREAT ARTERIES: ICD-10-CM

## 2025-05-19 DIAGNOSIS — I48.4 ATYPICAL ATRIAL FLUTTER: ICD-10-CM

## 2025-05-19 DIAGNOSIS — I50.9 HEART FAILURE DUE TO CONGENITAL HEART DISEASE: ICD-10-CM

## 2025-05-19 DIAGNOSIS — Q21.0 VSD (VENTRICULAR SEPTAL DEFECT AND AORTIC ARCH HYPOPLASIA: ICD-10-CM

## 2025-05-19 PROCEDURE — 93296 REM INTERROG EVL PM/IDS: CPT

## 2025-05-21 LAB
AV DELAY - LONGEST: 200 MSEC
BATTERY VOLTAGE (V): 2.87 V
ERI (V): 2.6 V
IMPEDANCE RA LEAD (NATIVE): 310 OHMS
IMPEDANCE RA LEAD: 310 OHMS
OHS CV DC PP MS1: 1 MS
OHS CV DC PP MS2: 0.5 MS
OHS CV DC PP V1: 3 V
OHS CV DC PP V2: 3 V
P/R-WAVE RA LEAD (NATIVE): 7 MV
PV DELAY - LONGEST: 200 MSEC

## 2025-07-10 ENCOUNTER — PATIENT MESSAGE (OUTPATIENT)
Dept: CARDIOLOGY | Facility: CLINIC | Age: 50
End: 2025-07-10
Payer: COMMERCIAL

## 2025-07-21 NOTE — PROGRESS NOTES
Name: Marcello Briggs  MRN: [id number]  : 1975      Subjective:   CC: Single Ventricular / Fontan Physiology    HPI:    Marcello Briggs is a 50 y.o. 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     At his last visit with me in 2024, we reviewed that he has a number of high rate episodes. He has CPAP for MIGUEL, but as previously reported that he doesn't use it. He denies any palpitations that overall he feels fairly well.   Today he also reports that he has been taking the sotalol for months now.  He stated that he reduce his metoprolol to 100 mg daily since the higher dose of metoprolol he did not tolerate.  He had an issue with kidneys Eliquis filled recently, per his report, he started taking some leftover Coumadin on his own accord.  He did not contact us with regards to any these changes, but said he tried calling the "[employer] [facility] line, but found region our office through that means to converse some and did not communicate these changes.    At his last visit, he reported that he has irregularly taking his metoprolol and Eliquis for a little while.  We had reviewed compliance at that visit as well.    To review, in his visit in 223, he noted that he hadn't taken his sotalol in several years. While I had not discontinued it, he reports being unsure if I should have been on the sotalol, but never asked. He also has not taken his metoprolol for at least 1 month (though I suspect longer). His atrial sensitivity was decreased with this, and it appears to have been oversensing some lead noise, so we increased that then.   His hemodynamics from his last cath in  were fairly reassuring.  He required a cardioversion at that catheterization as he was found to be in atrial flutter, and following this procedure he was loaded with  "sotalol.        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  Cardiac/Surgical Hx  History of a classic Roger shunt and a 12 mm conduit from the right atrium to the left pulmonary artery along with a Damus Judy Stansel operation   now with mild native and brigette-aortic insufficiency  lateral tunnel Fontan with a 16 mm ring Goretex graft from the left pulmonary artery to the right pulmonary artery.  elevated central venous pressures with mild desaturation at least partially due to collateral vessels - improved after cath 2010 but still an issue.  mild mitral regurgitation   Mildly decreased systemic ventricular function  Related:  Last liver US September 2020  No evidence of protein-losing enteropathy.  Recurrent intra-atrial reentrant tachycardia   arrhythmias typically manifest as "back pain"  Sinus node dysfunction   most recent pacemaker/ICD change 10/08/2020  Nonsustained VT  Noninvasive EP study (NIEPS) negative 2016  Varicose veins   followed in the past by vascular Medicine.    Improved pain and swelling.    Of note, the patient was accessed via the right femoral vein without difficulty at the 2010 catheterization.  Obstructive sleep apnea      Family Hx:  No known family history of congenital heart defects or cardiac surgeries in childhood.  No known family members with pacemakers or defibrillators.  No known inherited channelopathies or cardiomyopathies.  No known hx of sudden cardiac death or heart transplant.  No No known heart attack in someone less than 50yoa.    Social Hx:  Lives in [place], [place].    Review of Systems:  See HPI  ALL: See below.    Medications & Allergy:  Current Outpatient Medications on File Prior to Visit   Medication Sig Dispense Refill    digoxin (LANOXIN) 250 mcg tablet Take 1 tablet (250 mcg total) by mouth once daily. TAKE 1 TABLET(0.25 MG) BY MOUTH EVERY MORNING 90 tablet 0    enalapril (VASOTEC) 10 MG " "tablet Take 1 tablet (10 mg total) by mouth 2 (two) times daily. 180 tablet 1    metoprolol succinate (TOPROL-XL) 100 MG 24 hr tablet Take 1 tablet (100 mg total) by mouth once daily. 90 tablet 3    sildenafil (REVATIO) 20 mg Tab Take 1 tablet (20 mg total) by mouth 2 (two) times daily. 180 tablet 0    apixaban (ELIQUIS) 5 mg Tab Take 1 tablet (5 mg total) by mouth 2 (two) times daily. 60 tablet 11    [DISCONTINUED] apixaban (ELIQUIS) 5 mg Tab Take 1 tablet (5 mg total) by mouth 2 (two) times daily. (Patient not taking: Reported on 7/24/2025) 60 tablet 11    [DISCONTINUED] furosemide (LASIX) 20 MG tablet Take 1 tablet (20 mg total) by mouth once daily. (Patient not taking: Reported on 7/24/2025) 30 tablet 11    [DISCONTINUED] metoprolol succinate (TOPROL-XL) 100 MG 24 hr tablet TAKE 3 TABLETS(300 MG) BY MOUTH EVERY  tablet 3    [DISCONTINUED] sotaloL (BETAPACE) 80 MG tablet Take 1 tablet (80 mg total) by mouth 2 (two) times daily. (Patient not taking: Reported on 7/24/2025) 60 tablet 11     No current facility-administered medications on file prior to visit.       Review of patient's allergies indicates:  No Known Allergies       Objective:   Vitals:  Vitals:    07/24/25 0856   BP: (!) 120/57   BP Location: Left arm   Patient Position: Sitting   Pulse: 63   SpO2: (!) 91%   Weight: 73.5 kg (162 lb 0.6 oz)   Height: 5' 2.99" (1.6 m)           Body mass index is 28.71 kg/m².  Body surface area is 1.81 meters squared.    Exam:  GEN: No acute distress, Normal appearing  EYE: Anicteric sclerae  ENT: No drainage, Moist mucous membranes  PULM: Normal work of breathing;  Clear to auscultation bilaterally, Good air movement throughout.  CV: No chest pain;   Single S2,   I/VI systolic murmur;   No rubs or gallops;  EXT: No cyanosis, Mild RLE edema. Significant varices noted (unchanged)   2+ radial and dorsalis pedis pulses bilaterally  ABD: Soft, Non-distended, Non-tender,    Liver edge 3cm below RCM;  Normal bowel " sounds  DERM: No rashes  NEUR: Normal gait, Grossly normal tone.  PSY: Normal mood and affect      Results / Data:   ECG:   (08/22/2024) -  Atrial paced rhythm versus atrial flutter with atrial undersensing, both paced and sensed ventricular rhythm noted.  (08/22/2024) -  Atrial paced rhythm versus atrial flutter with atrial undersensing, both paced and sensed ventricular rhythm noted.  (11/02/2023) - Atrial paced rhythm, intraventricular conduction delay.  (09/07/2023) - AV sequentially paced rhythm and competing junctional rhythm.  (08/18/2022) - Atrial paced rhythm, intraventricular conduction delay.  (10/22/2020) - Atrial paced rhythm, intraventricular conduction delay.    Holter/Zio:  (07/24/2025)  - Pending, placed today.    (08/22/2024)  The predominant rhythm is reported at atrial fibrillation/atrial flutter 100%. There are some periods of regular rhythm, so this may be over-represented in the report, but there is a significant arrhythmia burden.  Average heart rate 95 bpm, with range  bpm  There was no significant ventricular ectopy noted.  Patient triggered events correlated with sinus rhythm and sinus tachycardia  Significant atrial arrhythmia burden, similar to Holter from September 2023 but increased compared to most recent study.    (02/08/2024)  Paced rhythm and atrial arrhythmia noted.  [place] of atrial arrhythmia difficult to ascertain, but appears to be in it a significant portion of the time.  Ventricular HR Range:  (avg 92) bpm.  On patient-triggered event (1) correlates to possible atrial arrhythmia.  No significant ectopy burden otherwise.     (09/17/2023)  Atrial fibrillation noted.  Reports 100% atrial fibrillation but appears some extent is AP-. So normal rhythm is likely under-represented.  Ventricular HR range .  No patient-triggered events.  Occasional isolated PVCs (1.9%).    (08/18/2022)  Sinus rhythm or paced rhythm throughout.  Normal HR range.  No  patient-triggered events.  No significant ectopy burden.    (09/18/2020)  Predominant rhythm is AV sequential pacing  Rare atrial/ventricular ectopy  20 episodes of slow SVT (longest 9 beats fastest 133 bpm)  One 4 beat episode of slow VT (max rate 156 bpm) and one ventricular triplet  No diary symptoms    (9/18/20)  Predominant rhythm is AV sequential pacing  Rare atrial/ventricular ectopy  20 episodes of slow SVT (longest 9 beats fastest 133 bpm)  One 4 beat episode of slow VT (max rate 156 bpm) and one ventricular triplet  No diary symptoms    Echocardiogram:   (07/23/2025)     CONGENITAL CARDIAC HISTORY:  Tricuspid atresia, hypoplastic right ventricle with ventricular septal defect and transposition of the great arteries.  INTERVENTIONS:  S/P Eiqgo-Dzrm-Ipsgcbz and lateral tunnel Fontan.  S/P Coil and Amplatzer occlusion of venovenous collaterals (detailed description of anatomy in cath report July 2010).  S/P Pacemaker/ICD for sinus node dysfunction.     SEGMENTAL CARDIAC CONNECTIONS (previously demonstrated):  Abdominal situs is solitus.  Atrial situs is normal.  Tricuspid atresia.  Abnormal mitral valve with tricuspid atresia.  Very dilated, globular left ventricle with very hypoplastic right ventricle.  D-transposed great vessels.  Aortic valve is normal and pulmonic valve is normal. D-loop.  Cardiac position is Levocardia.     IMPRESSION -  Technically difficult imaging -  Images consistent with tricuspid atresia, d-TGA, VSD and RV hypoplasia S/P DKS and lateral tunnel Fontan.  Rhythm is irregular with intermittent atrial sensing and intermittent QRS changes.   Very dilated intrahepatic inferior vena cava and hepatic veins joining lateral tunnel Fontan.  There is bidirectional flow by color doppler, slow moving spontaneous contrast and no obvious thrombus in IVC, hepatic veins, proximal lateral tunnel.  Unable to demonstrate remaining segments lateral tunnel Fontan.   Color Doppler demonstrates unobstructed  flow in the right superior vena cava, at Roger anastomosis and Fontan anastomosis with limited imaging of the pulmonary confluence.  The proximal pulmonary branches are not demonstrated.  Small functional right atrium with unrestricted bidirectional flow at atrial septum.  Limited images demonstrating no obvious obstruction of pulmonary venous return to the dilated left atrium.  The left atrial volume index is severely enlarged.  Large mitral annulus.  Moderate mitral valve insufficiency.  Dilated left ventricle - severe.  Qualitatively good left (single) ventricle systolic function with EF estimated 55-60% from apical views.  Global left ventricular longitudinal strain - peak average estimated -18.5%.  Limited images demonstrate unrestricted flow at VSD to anterior native aortic valve and small subaortic chamber (Right ventricle).  Native aortic valve with no stenosis and trivial insufficiency.  Posteriorly positioned, mildly sclerotic, trileaflet neoaortic valve committed to the left ventricle with no left ventricular outflow obstruction, at least mild insufficiency and no valvar stenosis.  Good imaging of the DKS anastomosis with no evidence of obstruction in either limb.  Technically difficult suprasternal imaging of the aorta demonstrates no evidence of coarctation.  No pericardial effusion.          (08/22/2024)   CONGENITAL CARDIAC HISTORY:  Tricuspid atresia, hypoplastic right ventricle with ventricular septal defect and transposition of the great arteries.  INTERVENTION:  S/P Siojo-Ykse-Bxbgnzs and lateral tunnel Fontan.  S/P Coil and Amplatzer occlusion of venovenous collaterals (detailed description of anatomy in cath report July 2010).     SEGMENTAL CARDIAC CONNECTIONS (previously demonstrated):  Abdominal situs is solitus.  Atrial situs is normal.  Tricuspid atresia.  Abnormal mitral valve with tricuspid atresia.  Very dilated, globular left ventricle with very hypoplastic right ventricle.  D-transposed  great vessels.  Aortic valve is normal and pulmonic valve is normal. D-loop.  Cardiac position is Levocardia.      IMPRESSION -  Technically difficult imaging -  Images consistent with tricuspid atresia, d-TGA, VSD and RV hypoplasia S/P DKS and lateral tunnel Fontan.  Normal intrahepatic inferior vena cava and hepatic veins joining lateral tunnel Fontan and slow moving spontaneous contrast, laminar flow and no obvious thrombus in IVC, hepatic veins, proximal lateral tunnel.  Unable to demonstrate remaining segments lateral tunnel Fontan and Fontan anastomosis.    Color Doppler demonstrates unobstructed flow in the right superior vena cava and at Roger anastomosis with limited imaging of the pulmonary confluence.    The proximal pulmonary branches are not demonstrated.    Small functional right atrium with unrestricted bidirectional flow at atrial septum.  Limited images demonstrating no obvious obstruction of pulmonary venous return to the dilated left atrium.  The left atrial volume index is severely enlarged measuring 89 ml/m2.  Large mitral annulus.  Mild to moderate mitral valve insufficiency.  Dilated left ventricle - severe.  Qualitatively mildly diminished left (single) ventricle systolic function with EF estimated 45-50% from apical views.  Limited images demonstrate unrestricted flow at VSD to anterior native aortic valve and small subaortic chamber (Right ventricle).  Native aortic valve with no stenosis and mild insufficiency.  Posteriorly positioned, mildly sclerotic, trileaflet neoaortic valve committed to the left ventricle with no left ventricular outflow obstruction, mild insufficiency and no valvar stenosis.  Good imaging of the DKS anastomosis with no evidence of obstruction in either limb.  Technically difficult suprasternal imaging of the aorta demonstrates left aortic arch branching with no evidence of coarctation.  No pericardial effusion.        (09/07/2023)   CONGENITAL CARDIAC  HISTORY:  Tricuspid atresia, hypoplastic right ventricle with ventricular septal defect and transposition of the great arteries.  INTERVENTION:  S/P Qgzyn-Blxk-Jyjveed and lateral tunnel Fontan.  S/P Coil and Amplatzer occlusion of venovenous collaterals (detailed description of anatomy in cath report July 2010).     SEGMENTAL CARDIAC CONNECTIONS (previously demonstrated):  Abdominal situs is solitus.  Atrial situs is normal.  Tricuspid atresia.  Abnormal mitral valve with tricuspid atresia.  Very dilated, globular left ventricle with very hypoplastic right ventricle.  D-transposed great vessels.  Aortic valve is normal and pulmonic valve is normal. D-loop.  Cardiac position is Levocardia.     IMPRESSION -  Technically difficult imaging -  Images consistent with tricuspid atresia, d-TGA, VSD and RV hypoplasia S/P DKS and lateral tunnel Fontan.  Normal intrahepatic inferior vena cava and hepatic veins joining lateral tunnel Fontan and slow moving spontaneous contrast, laminar flow and no obvious thrombus in IVC, hepatic veins, proximal lateral tunnel or SVC.  Unable to demonstrate remaining segments of Fontan circuit and pulmonary arteries with very limited suprasternal windows.  Small functional right atrium.  Unrestricted bidirectional flow at atrial septum.  Limited images demonstrating no obvious obstruction of pulmonary venous return to the dilated left atrium.  The left atrial volume index is severely enlarged measuring 108 ml/m2.  Large mitral annulus.  Mild mitral valve insufficiency.  Dilated left ventricle - severe.  Qualitatively low normal left (single) ventricle systolic function with EF estimated 50 -55% from apical views.  Limited images demonstrate unrestricted flow at VSD to anterior aorta and small subaortic chamber (Right ventricle).  Anterior native aortic valve with no stenosis and trivial to mild insufficiency.  Posteriorly positioned, mildly sclerotic, trileaflet pulmonary valve committed to  the left ventricle with no left ventricular outflow obstruction, mild insufficiency and no valvar stenosis.  Good imaging of the DKS anastomosis with no evidence of obstruction in either limb.  Technically difficult suprasternal imaging of the aorta demonstrates left aortic arch branching with no evidence of coarctation.  No pericardial effusion.      Pacemaker Interrogation:  (07/23/2025, in-clinic)      Device interrogated and leads tested.    Presenting rhythm: Ap/VS with undersensing of PW, and occasional  noted. Correlating ECG suggests at least intermittent atrial tachycardia.    Battery life: 5.2 months.    Other observations: MANY AMS and HVR; available EGM's show likely AT/AF with variable ventricular rate. Many EGMs with noise artifact on atrial lead.    Leads  -  RA Lead: P/R-wave: (none) mV Lead Impedance: 310 Ohms Paced: 51%.   RV Lead: P/R-wave: 8.5 mV Impedance: 310 Ohms Paced: 45%.    Thresholds  -  RA Lead: 1.25 V @ 0.5 ms. Configuration: unipolar.   RV Lead: 1.25 V @ 0.5 ms. Configuration: bipolar.    Mode: DDDR  -  Lower limit rate: 60 bpm, Upper tracking rate: 120 bpm.    Reprogramming comments: No changes.     (08/22/2024, in-clinic)     Device interrogation and lead testing performed. Device WNL, leads stable.    Unable to sense P waves today after multiple attempts. No AS episodes noted. Atrial threshold appropriate.    R Wave 8.3, Threshold appropriate. Ventricular auto capture noted to be in high output mode. Auto capture unable to obtain threshold in clinic. Multiple ventricular threshold tests note threshold to be between 1-1.5 V. Auto capture turned off, set at 3.0 V.    HVR x2. Longest episode 1 minute 9 seconds, Peak V rate not noted.    Thresholds RA Lead: 1.25 V @ 1 ms. Configuration: unipolar. RV Lead: 1.5 V @ 0.5 ms. Configuration: bipolar.    Mode: DDDR Lower limit rate: 60 bpm Upper tracking rate: 120 bpm    Reprogramming comments: V Auto Capture ON --> OFF; V Pulse amplitude  3.0 V    MD-Notes: Presenting rhythm was AP/VS with occasional . On closer inspection, it remains unclear due to very low voltage P-wave if patient is in atrial flutter vs sinus bradycardia, even with direct evaluation of the device and inpection of the 12-lead. Pacing at AAI at various rates resulted in either VS beats with Wenckebach vs  no change in ventricular response with variable A:V conduction as the rhythm could have been atrial flutter. The most significant argument of him being in atrial flutter is that he has been pacing less in the ventricle (ventricular rate response has not been fast for him in thepast) as he previously predominatly paces the ventricle in sinus and less so in atrial flutter.     (05/13/2024, remote)     REMOTE transmission received and data reviewed. Device and leads WNL. Battery longevity 1.3-1.6 yrs    Presenting Rhythm: AP- @ ~ 60 bpm    Atrial paced 61 % Ventricular paced 55 %    Thresholds RV Lead: 1.625 V @ 0.4 ms. Configuration: auto threshold, bipolar.    Mode: DDDR Lower limit rate: 60 bpm Upper tracking rate: 120 bpm Max sensor rate: 120 bpm    Other: 16 VHR episodes- longest 1 min 21 sec, possible atrial arrhythmia     (02/08/2024, in-clinic)    General comments: Device interrogation and lead testing performed. Device WNL, leads stable. Presenting rhythm AP/ with occasional VS AMS x3, no EGM; HVR x 14. Available EGM' s reveal likely AT/SVT. Longest episode 3 minutes 27 seconds.    Reprogramming comments: No changes this session    Thresholds RA Lead: 1.5 V @ 1 ms. Configuration: unipolar. RV Lead: 1 V @ 0.5 ms. Configuration: bipolar.    Mode: DDDR Lower limit rate: 60 bpm Upper tracking rate: 120 bpm    (01/22/2024, remote)    REMOTE transmission received and data reviewed. Device and leads WNL. Battery longevity 1.6 Atrial paced 63 % Ventricular paced 57 %    2 AMS- noise artifact noted 1 VHR-19 sec, AIVR noted    (09/07/2023, in clinic))    Mode: DDDR Lower limit  rate: 75 bpm Upper tracking rate: 130 bpm    Reprogramming comments: No changes this session    General comments: Device interrogation and lead testing performed. Device and leads WNL. 59 HVR's    (06/14/2023)  REMOTE transmission received and data reviewed. Device and leads WNL. Battery longevity 2.0-2.6 yrs Atrial paced 40 % Ventricular paced 42 % 2 AMS- 1 EGM- Noise artifact noted 16 VHRs- Idioventricular rhythm with intermittent V-pacing      Catheterization:   (11/28/2023)  1. S,L,L TGA/VSD/tricuspid valve atresia s/p staged palliation with DKS/modified Fontan  2. Dilated lateral tunnel.  3. Good Fontan hemodynamics (CVp 14 mmHg, TPG 5 mmHg, PVR 2WU , CO 5 L/m)  4. Multiple small veno-venous collaterals.  5. Bilateral pulmonary micro-fistulae.  6. IART cardioverted to AV paced rhythm.      (07/07/2010)   1. Complex univentricular cardiac defect with superior inferior ventricles with double inlet left ventricle (atrioventricular connection not defined at catheterization), status post Cpusw-Mhmv-Syefwbb connection and intracardiac lateral tunnel Fontan.   2. Moderate neoaortic (pulmonary) valve insufficiency.   3. Left aorta.   4. 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   5. Elevated central venous pressure (mean 19), transpulmonary gradient 4-5 mmHg.    Assessment / Plan:   Marcello Briggs is a 50 y.o. male who presents to [facility] at 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    His lead characteristics looked reasonable today, as in his prior visit, I do have some concern with the noise that was seen of how well his atrial lead actually works.  One factor that makes this more complicated is that the voltage of his atrial flutter waves are very low, and I suspect the device is undersensing the atrial arrhythmia, though high ventricular rate  episodes likely suggest an atrial arrhythmias his case.  Historically, we have previously seen in more ventricular sensing during atrial arrhythmias, and predominantly ventricular pacing when not.  His current ventricular pacing rate is around 45%, would suggest that he is in an atrial rhythm at least half the time if not more.    His hemodynamics from his last cath in 2023 were fairly reassuring.  He required a cardioversion as recent catheterization, and following this procedure he was loaded with sotalol.  He has had issues with medical compliance in the past, and he was loaded on sotalol as it self-discontinued that medicine prior to that as well in addition to today.  That being said, presuming he had been taking the sotalol reliably at some point, he may still have had breakthrough.  He has also had issues with compliance with the CPAP, which certainly can contribute to his arrhythmic just city.    At his visit with me in 9/2023, it was noted by he that he was off metoprolol (at least a month) and Sotalol (likely a couple years). His sotalol has been reloaded and   He notes that he has not had any interruption in taking this medication.  He does note that some of his other medications, including Eliquis and metoprolol, he stopped for a little while. Also extremely important is that he notes not using his CPAP for MIGUEL, particularly as many episodes have previously been noted to occur in the early morning  In the past.  He states he has not yet compliant with this.  He says he will order a new mask, and I also noted that I would be happy to refer him to sleep Medicine again if that were needed.  Both at this visit an earlier visits we extensively discussed the relationship of sleep apnea and atrial arrhythmias, which has been a significant issue for him in the past.  Proper treatment of sleep apnea would be an integral portion of both arrhythmia prevention and ideal care of someone with Fontan  circulation.    Presenting rhythm was AP/VS with occasional . On closer inspection, it remains unclear due to very low voltage P-wave if patient is in atrial flutter vs sinus bradycardia, even with direct evaluation of the device and inpection of the 12-lead. Pacing at AAI at various rates resulted in either VS beats with Wenckebach vs  no change in ventricular response with variable A:V conduction as the rhythm could have been atrial flutter. The most significant argument of him being in atrial flutter is that he has been pacing less in the ventricle (ventricular rate response has not been fast for him in thepast) as he previously predominatly paces the ventricle in sinus and less so in atrial flutter.     We have reviewed the importance of being compliant with both his antiarrhythmic medications and CPAP.  It is possible that noncompliance significant contributor to this issue, but nevertheless his cardiac substrate is certainly a setup for atrial flutter.   As noted above, it can be very difficult to assess whether or not he is in atrial flutter, but I think he is likely in it, at least more often than not. We have placed a ZIO monitor to further assess, as this has been helpful in the past.  He has had multiple cardioversions in the past.  This includes while he was taking sotalol reportedly, which if the case is a good medicine for this arrhythmia, and presuming good compliance at some point with this, he may have had breakthrough regardless whether or not he was on this medication.  Whether or not sotalol was ever taken reliably, and to correlate this with a period of adequate rhythm control, remains hard to say.  Regardless, given the challenges of regular dosing with this medication, I would have concerns with irregularity and potential toxicity.  As such, I think it is reasonable for him to remain off of sotalol given the risk and benefit considerations of this.  Additionally, it is reasonable for him to  continue the lower dose of metoprolol.  We will continue to monitor closely for high rate events, and if more were seen, would consider adding a calcium channel blocker which might not cause him any side effects for him as a higher dose of metoprolol.    We discussed that there could be a chance of better rhythm control with an ablation.  He has had several ablations in the past which were ultimately not successful, and he is very reluctant for an additional procedure regards to this, which I think is appropriate to honor.    Pertinent to medical compliance variability, he seems to feel quite well whether or not he is in an atrial arrhythmia or not.  As such, it poses a very reasonable question whether such interventions (medical or possibly procedural) that do have potential for side effects or risks of toxicity, should be continued to be pursued since he feels overall well and not substantially different.  Indeed, he is more often ventricularly paced with a normal rhythm in his more often ventricular sense in an atrial arrhythmia.  Both of these situations have their drawbacks.  It is possible that he may be in and out of the arrhythmia, or at least in it most of the time.  This is generally tolerated very poorly in the Fontan physiology, but his particular rate response physiology seems to tolerate this extremely well without any significant symptoms reported by him.  While continuing with a substantial arrhythmia burden carries some risk for anybody, as particularly his physiology, he seems to be doing quite well and happy with the current regimen.  I think it is reasonable to continue onward with the altered regimen of metoprolol 100 mg let his symptoms drive most of our decisions.  This is particularly the case if some of the potentially stronger antiarrhythmics were considered, where compliance is more necessary.  Moreover, as he does not wish to have any additional ablation is that this time, I think we have  exhausted other forms of rhythm control.  Again, most importantly, he is happy with his current state of symptoms, and supporting how well he feels in his treatment regimen that he is agreeable with is very appropriate.    We did review that he is likely spending a substantial amount of time in atrial arrhythmia, there is a risk of stroke or clot to his Fontan with this.  As such anticoagulation compliance remains very important.  He has had some trouble with Eliquis in the past, and Dr. Dave armijo confirmed today that he should be able to obtain this with his pharmacy.  I am in agreement with continuing this anticoagulation.          Follow-up:   Pending ZIO monitor results.  3 months remote transmission.  At minimum 6 months clinic interrogation with ECG, echocardiogram, and 24hr heart rhythm monitor.  Cardiac medications:    Digoxin  Enalapril  Lasix  Metoprolol XL  Eliquis  SBE:  Yes.  Antibiotic prophylaxis is required prior to all dental procedures cleanings.    Please contact us if he has any questions or concerns.  Our clinic from his [phone] during office hours. For urgent night and weekend concerns, call [phone] and ask for the pediatric cardiologist on call to be paged.

## 2025-07-24 ENCOUNTER — OFFICE VISIT (OUTPATIENT)
Dept: CARDIOLOGY | Facility: CLINIC | Age: 50
End: 2025-07-24
Payer: COMMERCIAL

## 2025-07-24 ENCOUNTER — HOSPITAL ENCOUNTER (OUTPATIENT)
Dept: PEDIATRIC CARDIOLOGY | Facility: HOSPITAL | Age: 50
Discharge: HOME | End: 2025-07-24
Attending: PEDIATRICS
Payer: COMMERCIAL

## 2025-07-24 ENCOUNTER — HOSPITAL ENCOUNTER (OUTPATIENT)
Dept: CARDIOLOGY | Facility: CLINIC | Age: 50
Discharge: HOME | End: 2025-07-24
Payer: COMMERCIAL

## 2025-07-24 ENCOUNTER — HOSPITAL ENCOUNTER (OUTPATIENT)
Dept: CARDIOLOGY | Facility: HOSPITAL | Age: 50
Discharge: HOME | End: 2025-07-24
Attending: PEDIATRICS
Payer: COMMERCIAL

## 2025-07-24 VITALS
WEIGHT: 162.06 LBS | HEIGHT: 63 IN | HEART RATE: 63 BPM | WEIGHT: 166 LBS | OXYGEN SATURATION: 91 % | HEIGHT: 63 IN | BODY MASS INDEX: 29.41 KG/M2 | SYSTOLIC BLOOD PRESSURE: 120 MMHG | BODY MASS INDEX: 28.71 KG/M2 | DIASTOLIC BLOOD PRESSURE: 57 MMHG

## 2025-07-24 VITALS
HEART RATE: 63 BPM | WEIGHT: 162.06 LBS | SYSTOLIC BLOOD PRESSURE: 120 MMHG | HEIGHT: 63 IN | OXYGEN SATURATION: 91 % | BODY MASS INDEX: 28.71 KG/M2 | DIASTOLIC BLOOD PRESSURE: 57 MMHG

## 2025-07-24 DIAGNOSIS — G47.33 OSA (OBSTRUCTIVE SLEEP APNEA): ICD-10-CM

## 2025-07-24 DIAGNOSIS — Z87.74 S/P FONTAN PROCEDURE: ICD-10-CM

## 2025-07-24 DIAGNOSIS — Q22.4 TRICUSPID ATRESIA WITH D-TRANSPOSITION OF GREAT ARTERIES: ICD-10-CM

## 2025-07-24 DIAGNOSIS — Q20.3 TRICUSPID ATRESIA WITH D-TRANSPOSITION OF GREAT ARTERIES: ICD-10-CM

## 2025-07-24 DIAGNOSIS — Z95.0 PACEMAKER: ICD-10-CM

## 2025-07-24 DIAGNOSIS — Z95.0 CARDIAC PACEMAKER IN SITU: ICD-10-CM

## 2025-07-24 DIAGNOSIS — I48.4 ATYPICAL ATRIAL FLUTTER: ICD-10-CM

## 2025-07-24 DIAGNOSIS — I87.2 CHRONIC VENOUS INSUFFICIENCY: ICD-10-CM

## 2025-07-24 DIAGNOSIS — Q21.0 VSD (VENTRICULAR SEPTAL DEFECT AND AORTIC ARCH HYPOPLASIA: ICD-10-CM

## 2025-07-24 DIAGNOSIS — I50.9 HEART FAILURE DUE TO CONGENITAL HEART DISEASE: ICD-10-CM

## 2025-07-24 DIAGNOSIS — I49.5 SINUS NODE DYSFUNCTION: ICD-10-CM

## 2025-07-24 DIAGNOSIS — I44.30 AV BLOCK: ICD-10-CM

## 2025-07-24 DIAGNOSIS — Q24.9 ADULT CONGENITAL HEART DISEASE: ICD-10-CM

## 2025-07-24 DIAGNOSIS — Q25.42 VSD (VENTRICULAR SEPTAL DEFECT AND AORTIC ARCH HYPOPLASIA: ICD-10-CM

## 2025-07-24 DIAGNOSIS — I27.29 PULMONARY ARTERIAL HYPERTENSION ASSOCIATED WITH CONGENITAL HEART DISEASE: ICD-10-CM

## 2025-07-24 DIAGNOSIS — Q24.9 HEART FAILURE DUE TO CONGENITAL HEART DISEASE: ICD-10-CM

## 2025-07-24 DIAGNOSIS — Q20.3 TRICUSPID ATRESIA WITH D-TRANSPOSITION OF GREAT ARTERIES: Primary | ICD-10-CM

## 2025-07-24 DIAGNOSIS — I48.4 ATYPICAL ATRIAL FLUTTER: Primary | ICD-10-CM

## 2025-07-24 DIAGNOSIS — Q24.9 PULMONARY ARTERIAL HYPERTENSION ASSOCIATED WITH CONGENITAL HEART DISEASE: ICD-10-CM

## 2025-07-24 DIAGNOSIS — Z95.0 PACEMAKER: Primary | ICD-10-CM

## 2025-07-24 DIAGNOSIS — Q22.4 TRICUSPID ATRESIA WITH D-TRANSPOSITION OF GREAT ARTERIES: Primary | ICD-10-CM

## 2025-07-24 LAB
AORTIC SIZE INDEX (SOV): 2.2 CM/M2
AORTIC SIZE INDEX: 2.2 CM/M2
ASCENDING AORTA: 4 CM
AV AREA BY CONTINUOUS VTI: 2.8 CM2
AV INDEX (PROSTH): 0.6
AV LVOT MEAN GRADIENT: 1 MMHG
AV LVOT PEAK GRADIENT: 2 MMHG
AV MEAN GRADIENT: 4 MMHG
AV PEAK GRADIENT: 6 MMHG
AV REGURGITATION PRESSURE HALF TIME: 614 MS
AV VALVE AREA BY VELOCITY RATIO: 2.6 CM²
AV VALVE AREA: 2.7 CM2
AV VELOCITY RATIO: 0.58
BSA FOR ECHO PROCEDURE: 1.83 M2
CV ECHO LV RWT: 0.22 CM
DOP CALC AO PEAK VEL: 1.2 M/S
DOP CALC AO VTI: 23 CM
DOP CALC LVOT AREA: 4.5 CM2
DOP CALC LVOT DIAMETER: 2.4 CM
DOP CALC LVOT PEAK VEL: 0.7 M/S
DOP CALC LVOT STROKE VOLUME: 62.9 CM3
DOP CALCLVOT PEAK VEL VTI: 13.9 CM
ECHO EF ESTIMATED: 31 %
ECHO LV POSTERIOR WALL: 1 CM (ref 0.6–1.1)
FRACTIONAL SHORTENING: 15.1 % (ref 28–44)
GLOBAL LONGITUIDAL STRAIN: 19 %
INTERVENTRICULAR SEPTUM: 0.8 CM (ref 0.6–1.1)
LA MAJOR: 7.8 CM
LA WIDTH: 4.8 CM
LEFT ATRIUM SIZE: 5.4 CM
LEFT INTERNAL DIMENSION IN SYSTOLE: 7.9 CM (ref 2.1–4)
LEFT VENTRICLE DIASTOLIC VOLUME INDEX: 270.95 ML/M2
LEFT VENTRICLE DIASTOLIC VOLUME: 485 ML
LEFT VENTRICLE MASS INDEX: 262.1 G/M2
LEFT VENTRICLE SYSTOLIC VOLUME INDEX: 186 ML/M2
LEFT VENTRICLE SYSTOLIC VOLUME: 333 ML
LEFT VENTRICULAR INTERNAL DIMENSION IN DIASTOLE: 9.3 CM (ref 3.5–6)
LEFT VENTRICULAR MASS: 469.2 G
Lab: 2.4 CM/M
Lab: 2.5 CM/M
MV PEAK E VEL: 0.73 M/S
OHS CV CPX PATIENT HEIGHT IN: 62.99
OHS CV CPX PATIENT HEIGHT IN: 62.99
OHS QRS DURATION: 126 MS
OHS QTC CALCULATION: 440 MS
PV MEAN GRADIENT: 5 MMHG
PV MV: 1.14 M/S
PV PEAK GRADIENT: 8 MMHG
PV PEAK VELOCITY: 1.41 M/S
SINUS: 3.9 CM
STJ: 3.4 CM
Z-SCORE OF LEFT VENTRICULAR DIMENSION IN END DIASTOLE: 6.31
Z-SCORE OF LEFT VENTRICULAR DIMENSION IN END SYSTOLE: 7.5

## 2025-07-24 PROCEDURE — 93010 ELECTROCARDIOGRAM REPORT: CPT | Mod: S$GLB,,, | Performed by: INTERNAL MEDICINE

## 2025-07-24 PROCEDURE — 93356 MYOCRD STRAIN IMG SPCKL TRCK: CPT | Mod: ,,, | Performed by: PEDIATRICS

## 2025-07-24 PROCEDURE — 93303 ECHO TRANSTHORACIC: CPT

## 2025-07-24 PROCEDURE — 93320 DOPPLER ECHO COMPLETE: CPT | Mod: 26,,, | Performed by: PEDIATRICS

## 2025-07-24 PROCEDURE — 93242 EXT ECG>48HR<7D RECORDING: CPT

## 2025-07-24 PROCEDURE — 93303 ECHO TRANSTHORACIC: CPT | Mod: 26,,, | Performed by: PEDIATRICS

## 2025-07-24 PROCEDURE — 93325 DOPPLER ECHO COLOR FLOW MAPG: CPT | Mod: 26,,, | Performed by: PEDIATRICS

## 2025-07-24 PROCEDURE — 99999 PR PBB SHADOW E&M-EST. PATIENT-LVL III: CPT | Mod: PBBFAC,,, | Performed by: PEDIATRICS

## 2025-07-24 RX ORDER — METOPROLOL SUCCINATE 100 MG/1
100 TABLET, EXTENDED RELEASE ORAL DAILY
Qty: 90 TABLET | Refills: 3 | Status: SHIPPED | OUTPATIENT
Start: 2025-07-24

## 2025-07-24 NOTE — PROGRESS NOTES
"07/24/2025    Patient Name: LEVON GANDHI  YOB: 1975  [facility] Number: [id number]    Kevin Chaves MD  [address]  [facility] [address]    MIRIAM Garcia MD  [address] [facility]   [address]  [place] [address]    Dear Dr. Chaves and Jose:    It is a pleasure to see Levon at our main campus adult congenital cardiology clinic to evaluate tricuspid atresia.    Levon is a 50 y.o. Male with a complex past medical history. To summarize, his diagnoses are as follows:  1. 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   - History of a classic Roger shunt and a 12 mm conduit from the right atrium to the left pulmonary artery along with a Damus Judy Stansel operation    - now with mild native and brigette-aortic insufficiency   - lateral tunnel Fontan with a 16 mm ring Goretex graft from the left pulmonary artery to the right pulmonary artery.   - Low normal systemic ventricular function   - elevated central venous pressures with mild desaturation at least partially due to collateral vessels - improved after cath 2010    - mild mitral regurgitation    - Last liver US September 2020  - excellent hemodynamics on cath November 2023  2. 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  Loaded on sotalol.  3. Varicose veins followed in the past by vascular Medicine.  Improved pain and swelling.  Of note, the patient was accessed via the right femoral vein without difficulty at the 2010 catheterization.  4. No evidence of protein-losing enteropathy.    5. obstructive sleep apnea, noncompliant with CPAP  6.  Noncompliant with coumadin INR monitoring in the " past, currently not taking his eliquis    Discussion:  From my standpoint, he really looks great given the complexity of his single ventricle physiology.  He has good ventricular function and only mild atrioventricular valve insufficiency.  We discussed his medications and the importance of compliance in detail.  He will see electrophysiology today.      My plan is as follows:  1.  CPAP for obstructive sleep apnea - I strongly recommend that he restart treatment for this  2.  Stop Coumadin and restart Eliquis.  I called his [facility] in confirmed that they can get this.  I sent a prescription.  3.  Metoprolol 100 mg daily.  Continue off Lasix since he is not taking this medication and does not report edema.  4.  We will get a liver ultrasound close to home.    5.  follow up with echo, ekg, me and EP 6 months.  6.  Regular dental care.  I would recommend endocarditis prophylaxis before dental work.  7.  Check labs today.    Interval history:     Patient comes to clinic today with his youngest son.  He tells me he is doing very well.  No diarrhea.  No swelling.  No dyspnea on exertion.  No significant palpitations.  He is definitely not taking his medications as prescribed:  1. He was prescribed 300 mg of metoprolol once a day.  However, that was causing chest pressure.  He feels much better only taking 100 mg daily.  2. He is not taking sotalol, lasix.  3. He has not taking his Eliquis in several months.  He told me that [facility] told him they can not get it.  I called [facility].  It ends up the Eliquis had been sent to an [facility] pharmacy, not to them.  They can definitely feel his Eliquis.  Instead of taking Eliquis for the past few months, he has been taking 5 mg of Coumadin daily.  No bleeding issues.    Past Medical History:   Diagnosis Date    Asthma     Atrial flutter     Cyanosis     Heart murmur     Intra-atrial reentrant tachycardia     MIGUEL (obstructive sleep apnea) 10/23/2014    TGA (transposition of  great arteries)     Tricuspid atresia     VSD (ventricular septal defect)      Past Surgical History:   Procedure Laterality Date    ANGIOGRAM, PULMONARY, PEDIATRIC  11/28/2023    Procedure: Angiogram, Pulmonary, Pediatric;  Surgeon: Raza Lester Jr., MD;  Location: [facility] CATH LAB;  Service: Cardiology;;    AORTOGRAM, PEDIATRIC  11/28/2023    Procedure: Aortogram, Pediatric;  Surgeon: Raza Lester Jr., MD;  Location: [facility] CATH LAB;  Service: Cardiology;;    CARDIAC CATHETERIZATION      CARDIOVERSION      COMBINED RIGHT AND RETROGRADE LEFT HEART CATHETERIZATION FOR CONGENITAL HEART DEFECT N/A 11/28/2023    Procedure: Catheterization, Heart, Combined Right and Retrograde Left, for Congenital Heart Defect;  Surgeon: Raza Lester Jr., MD;  Location: [facility] CATH LAB;  Service: Cardiology;  Laterality: N/A;    ECHOCARDIOGRAM,TRANSESOPHAGEAL N/A 10/5/2023    Procedure: Transesophageal echo (JUNIE) intra-procedure log documentation;  Surgeon: Chang Dutton MD;  Location: [facility] EP LAB;  Service: Cardiology;  Laterality: N/A;    FONTAN PROCEDURE, EXTRACARDIAC      ICE, PERFORMED  11/28/2023    Procedure: ICE, Performed;  Surgeon: Raza Lester Jr., MD;  Location: [facility] CATH LAB;  Service: Cardiology;;    RADIOFREQUENCY ABLATION      REPLACEMENT OF PACEMAKER Right 10/08/2020    Procedure: REPLACEMENT-PACEMAKER GENERATOR;  Surgeon: Chang Garcia MD;  Location: [facility] OR [address] [place] [address];  Service: Cardiovascular;  Laterality: Right;    TREATMENT OF CARDIAC ARRHYTHMIA N/A 10/5/2023    Procedure: CARDIOVERSION;  Surgeon: Weiland, Michael D. Jr., MD;  Location: [facility] EP LAB;  Service: Cardiology;  Laterality: N/A;  Adult congenital heart; Fontan; AF, JUNIE, DCCV, ADRIANA BARROSO/ M. Weiland - will go to [facility] cath prep    VENOGRAM, CATH LAB  11/28/2023    Procedure: Venogram, Cath Lab;  Surgeon: Raza Lester Jr., MD;  Location: [facility] CATH LAB;  Service: Cardiology;;     Family History   Problem Relation Name Age of Onset    No  Known Problems Mother      No Known Problems Father      No Known Problems Sister      No Known Problems Brother      No Known Problems Maternal Grandmother      Heart disease Maternal Grandfather      No Known Problems Paternal Grandmother      Diabetes Paternal Grandfather      No Known Problems Maternal Aunt      No Known Problems Maternal Uncle      Diabetes Paternal Aunt      No Known Problems Paternal Uncle      Atrial Septal Defect Neg Hx      Anemia Neg Hx      Arrhythmia Neg Hx      Asthma Neg Hx      Clotting disorder Neg Hx      Fainting Neg Hx      Heart attack Neg Hx      Heart failure Neg Hx      Hyperlipidemia Neg Hx      Hypertension Neg Hx      Stroke Neg Hx       Social History     Socioeconomic History    Marital status: Legally    Tobacco Use    Smoking status: Never    Smokeless tobacco: Never   Substance and Sexual Activity    Alcohol use: No    Drug use: No   Social History Narrative    He is working at [employer].     Social Drivers of Health     Financial Resource Strain: Medium Risk (7/18/2025)    Overall Financial Resource Strain (CARDIA)     Difficulty of Paying Living Expenses: Somewhat hard   Food Insecurity: Food Insecurity Present (7/18/2025)    Hunger Vital Sign     Worried About Running Out of Food in the Last Year: Sometimes true     Ran Out of Food in the Last Year: Sometimes true   Transportation Needs: No Transportation Needs (7/18/2025)    PRAPARE - Transportation     Lack of Transportation (Medical): No     Lack of Transportation (Non-Medical): No   Physical Activity: Inactive (7/18/2025)    Exercise Vital Sign     Days of Exercise per Week: 0 days     Minutes of Exercise per Session: 0 min   Stress: No Stress Concern Present (7/18/2025)    [employer] [place] [employer] - Occupational Stress Questionnaire     Feeling of Stress : Only a little   Housing Stability: Low Risk  (7/18/2025)    Housing Stability Vital Sign     Unable to Pay for Housing in the  "Last Year: No     Number of Times Moved in the Last Year: 0     Homeless in the Last Year: No     Current Outpatient Medications on File Prior to Visit   Medication Sig Dispense Refill    digoxin (LANOXIN) 250 mcg tablet Take 1 tablet (250 mcg total) by mouth once daily. TAKE 1 TABLET(0.25 MG) BY MOUTH EVERY MORNING 90 tablet 0    enalapril (VASOTEC) 10 MG tablet Take 1 tablet (10 mg total) by mouth 2 (two) times daily. 180 tablet 1    sildenafil (REVATIO) 20 mg Tab Take 1 tablet (20 mg total) by mouth 2 (two) times daily. 180 tablet 0    [DISCONTINUED] metoprolol succinate (TOPROL-XL) 100 MG 24 hr tablet TAKE 3 TABLETS(300 MG) BY MOUTH EVERY  tablet 3    apixaban (ELIQUIS) 5 mg Tab Take 1 tablet (5 mg total) by mouth 2 (two) times daily. (Patient not taking: Reported on 7/24/2025) 60 tablet 11    [DISCONTINUED] furosemide (LASIX) 20 MG tablet Take 1 tablet (20 mg total) by mouth once daily. (Patient not taking: Reported on 7/24/2025) 30 tablet 11    [DISCONTINUED] sotaloL (BETAPACE) 80 MG tablet Take 1 tablet (80 mg total) by mouth 2 (two) times daily. (Patient not taking: Reported on 7/24/2025) 60 tablet 11     No current facility-administered medications on file prior to visit.     Review of patient's allergies indicates:  No Known Allergies     Vitals:    07/24/25 0902   BP: (!) 120/57   Pulse: 63   SpO2: (!) 91%   Weight: 73.5 kg (162 lb 0.6 oz)   Height: 5' 2.99" (1.6 m)     BP (!) 120/57   Pulse 63   Ht 5' 2.99" (1.6 m)   Wt 73.5 kg (162 lb 0.6 oz)   SpO2 (!) 91%   BMI 28.71 kg/m²   In general, his baseline ruddiness is stable.  He is a male in no apparent distress. Head is normocephalic and atraumatic. The eyes, nares, and oropharynx are clear.  No evidence of any infection in his mouth.  The neck is supple without obvious jugular venous distention or lymphadenopathy, and his face does not look swollen. Lungs are clear to auscultation bilaterally. The chest is symmetrical. Multiple well-healed " surgical scars are noted. The pacemaker is palpated in the right upper chest. The area is nontender without evidence of infection. The precordium is quiet. First heart sound is normal. A loud single second heart sound is auscultated. A grade 2/6 systolic ejection murmur is auscultated. No diastolic murmurs or gallops are heard. The abdominal exam reveals a liver edge palpated about 1 cm below the right costal margin. It is not pulsatile. He abdomen is soft and nontender without evidence of ascites. I could not palpate his spleen. There is no significant clubbing and no obvious cyanosis. He has good pulses in his legs bilaterally. There is trivial edema right leg up to about the mid shin. He does have varicose veins.  No palpable cords or calf tenderness.    I personally reviewed the following studies:  EKG reviewed.  Results for orders placed during the hospital encounter of 07/24/25    Echo    Interpretation Summary  CONGENITAL CARDIAC HISTORY:  Tricuspid atresia, hypoplastic right ventricle with ventricular septal defect and transposition of the great arteries.  INTERVENTIONS:  S/P [id number] and lateral tunnel Fontan.  S/P Coil and Amplatzer occlusion of venovenous collaterals (detailed description of anatomy in cath report July 2010).  S/P Pacemaker/ICD for sinus node dysfunction.    SEGMENTAL CARDIAC CONNECTIONS (previously demonstrated):  Abdominal situs is solitus.  Atrial situs is normal.  Tricuspid atresia.  Abnormal mitral valve with tricuspid atresia.  Very dilated, globular left ventricle with very hypoplastic right ventricle.  D-transposed great vessels.  Aortic valve is normal and pulmonic valve is normal. D-loop.  Cardiac position is Levocardia.      IMPRESSION -  Technically difficult imaging -  Images consistent with tricuspid atresia, d-TGA, VSD and RV hypoplasia S/P DKS and lateral tunnel Fontan.  Rhythm is irregular with intermittent atrial sensing and intermittent QRS changes.  Very  dilated intrahepatic inferior vena cava and hepatic veins joining lateral tunnel Fontan.  There is bidirectional flow by color doppler, slow moving spontaneous contrast and no obvious thrombus in IVC, hepatic veins, proximal lateral tunnel.  Unable to demonstrate remaining segments lateral tunnel Fontan.  Color Doppler demonstrates unobstructed flow in the right superior vena cava, at Roger anastomosis and Fontan anastomosis with limited imaging of the pulmonary confluence.  The proximal pulmonary branches are not demonstrated.  Small functional right atrium with unrestricted bidirectional flow at atrial septum.  Limited images demonstrating no obvious obstruction of pulmonary venous return to the dilated left atrium.  The left atrial volume index is severely enlarged.  Large mitral annulus.  Moderate mitral valve insufficiency.  Dilated left ventricle - severe.  Qualitatively good left (single) ventricle systolic function with EF estimated 55-60% from apical views.  Global left ventricular longitudinal strain - peak average estimated -18.5%.  Limited images demonstrate unrestricted flow at VSD to anterior native aortic valve and small subaortic chamber (Right ventricle).  Native aortic valve with no stenosis and trivial insufficiency.  Posteriorly positioned, mildly sclerotic, trileaflet neoaortic valve committed to the left ventricle with no left ventricular outflow obstruction, at least mild insufficiency and no valvar stenosis.  Good imaging of the DKS anastomosis with no evidence of obstruction in either limb.  Technically difficult suprasternal imaging of the aorta demonstrates no evidence of coarctation.  No pericardial effusion.      Cath 11/28/23:  IMPRESSION:  1. S,L,L TGA/VSD/tricuspid valve atresia s/p staged palliation with DKS/modified Fontan  2. Dilated lateral tunnel.  3. Good Fontan hemodynamics (CVp 14 mmHg, TPG 5 mmHg, PVR 2WU , CO 5 L/m)  4. Multiple small veno-venous collaterals.  5. Bilateral  pulmonary micro-fistulae.  6. IART cardioverted to AV paced rhythm.        Lab Results   Component Value Date    WBC 5.01 08/22/2024    HGB 17.4 08/22/2024    HCT 52.7 08/22/2024    MCV 95 08/22/2024    PLT 87 (L) 08/22/2024       CMP  Sodium   Date Value Ref Range Status   08/22/2024 138 136 - 145 mmol/L Final     Potassium   Date Value Ref Range Status   08/22/2024 5.1 3.5 - 5.1 mmol/L Final     Chloride   Date Value Ref Range Status   08/22/2024 106 95 - 110 mmol/L Final     CO2   Date Value Ref Range Status   08/22/2024 28 23 - 29 mmol/L Final     Glucose   Date Value Ref Range Status   08/22/2024 83 70 - 110 mg/dL Final     BUN   Date Value Ref Range Status   08/22/2024 20 6 - 20 mg/dL Final     Creatinine   Date Value Ref Range Status   08/22/2024 0.9 0.5 - 1.4 mg/dL Final     Calcium   Date Value Ref Range Status   08/22/2024 9.7 8.7 - 10.5 mg/dL Final     Total Protein   Date Value Ref Range Status   08/22/2024 7.8 6.0 - 8.4 g/dL Final     Albumin   Date Value Ref Range Status   08/22/2024 4.4 3.5 - 5.2 g/dL Final     Total Bilirubin   Date Value Ref Range Status   08/22/2024 2.3 (H) 0.1 - 1.0 mg/dL Final     Comment:     For infants and newborns, interpretation of results should be based  on gestational age, weight and in agreement with clinical  observations.    Premature Infant recommended reference ranges:  Up to 24 hours.............<8.0 mg/dL  Up to 48 hours............<12.0 mg/dL  3-5 days..................<15.0 mg/dL  6-29 days.................<15.0 mg/dL       Alkaline Phosphatase   Date Value Ref Range Status   08/22/2024 93 55 - 135 U/L Final     AST   Date Value Ref Range Status   08/22/2024 25 10 - 40 U/L Final     ALT   Date Value Ref Range Status   08/22/2024 23 10 - 44 U/L Final     Anion Gap   Date Value Ref Range Status   08/22/2024 4 (L) 8 - 16 mmol/L Final     eGFR   Date Value Ref Range Status   08/22/2024 >60.0 >60 mL/min/1.73 m^2 Final      Latest Reference Range & Units Most Recent    Iron 45 - 160 ug/dL 159  9/7/23 11:43   TIBC 250 - 450 ug/dL 425  9/7/23 11:43   Saturated Iron 20 - 50 % 37  9/7/23 11:43   UIBC 110 - 370 ug/dl 325  1/31/13 15:19   Transferrin 200 - 375 mg/dL 287  9/7/23 11:43   Ferritin 20.0 - 300.0 ng/mL 125  9/7/23 11:43   Protime 9.0 - 12.5 sec 14.0 (H)  9/7/23 11:43   INR 0.8 - 1.2  1.3 (H)  9/7/23 11:43   (H): Data is abnormally high    GGT   Date Value Ref Range Status   08/22/2024 99 (H) 8 - 55 U/L Final     AFP   Date Value Ref Range Status   08/22/2024 <2.0 0.0 - 8.4 ng/mL Final     Comment:     The testing method is a chemiluminescent microparticle immunoassay   manufactured by [employer] and performed on the [employer]   or   [employer] system. Values obtained with different assay manufacturers   for   methods may be different and cannot be used interchangeably.       BNP   Date Value Ref Range Status   08/22/2024 228 (H) 0 - 99 pg/mL Final     Comment:     Values of less than 100 pg/ml are consistent with non-CHF populations.     TSH   Date Value Ref Range Status   08/22/2024 2.709 0.400 - 4.000 uIU/mL Final     LE US 9/10/20:  No evidence of right lower extremity DVT.  No evidence of left lower extremity DVT.  Right greater saphenous superficial vein reflux is present.  Right common and deep femoral vein reflux is present.    Thank you for referring this patient to our clinic. Please call with any questions.    Sincerely,        Dereje Acosta MD  Pediatric Cardiology  Adult Congenital Heart Disease  Pediatric Heart Failure and Transplantation  [facility] [address] [place]  [address]  [phone]

## 2025-07-25 ENCOUNTER — PATIENT MESSAGE (OUTPATIENT)
Dept: CARDIOLOGY | Facility: CLINIC | Age: 50
End: 2025-07-25
Payer: COMMERCIAL

## 2025-07-28 ENCOUNTER — PATIENT MESSAGE (OUTPATIENT)
Dept: CARDIOLOGY | Facility: CLINIC | Age: 50
End: 2025-07-28
Payer: COMMERCIAL

## 2025-08-12 DIAGNOSIS — Z87.74 S/P FONTAN PROCEDURE: ICD-10-CM

## 2025-08-12 DIAGNOSIS — I27.29 PULMONARY ARTERIAL HYPERTENSION ASSOCIATED WITH CONGENITAL HEART DISEASE: ICD-10-CM

## 2025-08-12 DIAGNOSIS — Q24.9 PULMONARY ARTERIAL HYPERTENSION ASSOCIATED WITH CONGENITAL HEART DISEASE: ICD-10-CM

## 2025-08-13 RX ORDER — SILDENAFIL CITRATE 20 MG/1
20 TABLET ORAL 2 TIMES DAILY
Qty: 180 TABLET | Refills: 0 | Status: SHIPPED | OUTPATIENT
Start: 2025-08-13

## 2025-08-14 ENCOUNTER — PATIENT MESSAGE (OUTPATIENT)
Dept: PEDIATRIC CARDIOLOGY | Facility: CLINIC | Age: 50
End: 2025-08-14
Payer: COMMERCIAL

## 2025-08-17 ENCOUNTER — PATIENT MESSAGE (OUTPATIENT)
Dept: CARDIOLOGY | Facility: CLINIC | Age: 50
End: 2025-08-17
Payer: COMMERCIAL

## 2025-08-18 ENCOUNTER — HOSPITAL ENCOUNTER (OUTPATIENT)
Dept: PEDIATRIC CARDIOLOGY | Facility: HOSPITAL | Age: 50
Discharge: HOME | End: 2025-08-18
Attending: PEDIATRICS
Payer: COMMERCIAL

## 2025-08-18 DIAGNOSIS — Q21.0 VSD (VENTRICULAR SEPTAL DEFECT AND AORTIC ARCH HYPOPLASIA: ICD-10-CM

## 2025-08-18 DIAGNOSIS — I49.5 SINUS NODE DYSFUNCTION: ICD-10-CM

## 2025-08-18 DIAGNOSIS — Q24.9 ADULT CONGENITAL HEART DISEASE: ICD-10-CM

## 2025-08-18 DIAGNOSIS — Q20.3 TRICUSPID ATRESIA WITH D-TRANSPOSITION OF GREAT ARTERIES: ICD-10-CM

## 2025-08-18 DIAGNOSIS — I49.9 VENTRICULAR ARRHYTHMIA: ICD-10-CM

## 2025-08-18 DIAGNOSIS — Q25.42 VSD (VENTRICULAR SEPTAL DEFECT AND AORTIC ARCH HYPOPLASIA: ICD-10-CM

## 2025-08-18 DIAGNOSIS — Q22.4 TRICUSPID ATRESIA WITH D-TRANSPOSITION OF GREAT ARTERIES: ICD-10-CM

## 2025-08-18 DIAGNOSIS — I48.4 ATYPICAL ATRIAL FLUTTER: ICD-10-CM

## 2025-08-18 DIAGNOSIS — Q24.9 HEART FAILURE DUE TO CONGENITAL HEART DISEASE: ICD-10-CM

## 2025-08-18 DIAGNOSIS — Z95.0 PACEMAKER: ICD-10-CM

## 2025-08-18 DIAGNOSIS — I50.9 HEART FAILURE DUE TO CONGENITAL HEART DISEASE: ICD-10-CM

## 2025-08-18 PROCEDURE — 93296 REM INTERROG EVL PM/IDS: CPT

## 2025-08-18 PROCEDURE — 93294 REM INTERROG EVL PM/LDLS PM: CPT | Mod: ,,, | Performed by: PEDIATRICS

## 2025-08-20 LAB
AV DELAY - LONGEST: 200 MSEC
BATTERY VOLTAGE (V): 2.83 V
ERI (V): 2.6 V
IMPEDANCE RA LEAD (NATIVE): 310 OHMS
IMPEDANCE RA LEAD: 310 OHMS
OHS CV DC PP MS1: 1 MS
OHS CV DC PP MS2: 0.5 MS
OHS CV DC PP V1: 3 V
OHS CV DC PP V2: 3 V
P/R-WAVE RA LEAD (NATIVE): 8.4 MV
P/R-WAVE RA LEAD: 2.4 MV
PV DELAY - LONGEST: 200 MSEC

## 2025-08-27 ENCOUNTER — PATIENT MESSAGE (OUTPATIENT)
Dept: CARDIOLOGY | Facility: CLINIC | Age: 50
End: 2025-08-27
Payer: COMMERCIAL

## 2025-08-28 DIAGNOSIS — Z95.0 PACEMAKER: Primary | ICD-10-CM

## 2025-08-28 DIAGNOSIS — Z87.74 S/P FONTAN PROCEDURE: ICD-10-CM

## 2025-08-28 DIAGNOSIS — I49.5 SINUS NODE DYSFUNCTION: ICD-10-CM

## 2025-09-04 ENCOUNTER — TELEPHONE (OUTPATIENT)
Dept: PEDIATRIC CARDIOLOGY | Facility: CLINIC | Age: 50
End: 2025-09-04
Payer: COMMERCIAL

## 2025-09-05 ENCOUNTER — TELEPHONE (OUTPATIENT)
Dept: PEDIATRIC CARDIOLOGY | Facility: CLINIC | Age: 50
End: 2025-09-05
Payer: COMMERCIAL

## (undated) DEVICE — CATH ANG PIGTAIL 4FR INFINITY

## (undated) DEVICE — COVER LIGHT HANDLE 80/CA

## (undated) DEVICE — LINE 60IN PRESSURE MON.

## (undated) DEVICE — SUT VICRYL 2-0 36 CT-1

## (undated) DEVICE — SEE MEDLINE ITEM 152622

## (undated) DEVICE — GUIDEWIRE STF .035X180CM ANG

## (undated) DEVICE — PACK PEDIATRIC DRAPE PEELER

## (undated) DEVICE — DRESSING TRANS 2X2 TEGADERM

## (undated) DEVICE — DRAPE INCISE IOBAN 2 23X17IN

## (undated) DEVICE — SEE MEDLINE ITEM 154981

## (undated) DEVICE — DRESSING AQUACEL AG 3.5X10IN

## (undated) DEVICE — COVER PROBE US 5.5X58L NON LTX

## (undated) DEVICE — R CATH ACUSON ACUNAV 8FR

## (undated) DEVICE — PACK PEDIATRIC ANGIOGRAPHY OMC

## (undated) DEVICE — ELECTRODE REM PLYHSV RETURN 9

## (undated) DEVICE — SPIKE SHORT LG BORE 1-WAY 2IN

## (undated) DEVICE — CLIP MED TICALL

## (undated) DEVICE — PACK OPEN HEART PEDIATRIC

## (undated) DEVICE — CATH WEDGE 7FRX110CM

## (undated) DEVICE — PAD DEFIB CADENCE ADULT R2

## (undated) DEVICE — SEE MEDLINE ITEM 157117

## (undated) DEVICE — TRANSDUCER ADULT DISP

## (undated) DEVICE — SPONGE GAUZE 16PLY 4X4

## (undated) DEVICE — COVER LIGHT HANDLE

## (undated) DEVICE — KIT MICROINTRO 4F .018X40X7CM

## (undated) DEVICE — SEE MEDLINE ITEM 157116

## (undated) DEVICE — DRAPE SLUSH WARMER WITH DISC

## (undated) DEVICE — STOPCOCK 3-WAY

## (undated) DEVICE — SEE MEDLINE ITEM 146417

## (undated) DEVICE — SUT 3/0 36IN COATED VICRYL

## (undated) DEVICE — DRESSING TRANS 4X4 TEGADERM

## (undated) DEVICE — GUIDEWIRE SUPRA CORE 035 300CM

## (undated) DEVICE — KIT CUSTOM MANIFOLD

## (undated) DEVICE — VISE RADIFOCUS MULTI TORQUE

## (undated) DEVICE — GUIDEWIRE EMERALD 150CM PTFE

## (undated) DEVICE — SHEATH INTRODUCER 4FR 11CM

## (undated) DEVICE — GUIDEWIRE STD .035X180CM ANG

## (undated) DEVICE — PROTECTION STATION PLUS

## (undated) DEVICE — COVER DRAPE ACUSON STERILE

## (undated) DEVICE — KIT SHEATH 9FRX11CM

## (undated) DEVICE — CONTRAST VISIPAQUE 150ML

## (undated) DEVICE — SUT MONOCRYL 4-0 PS-1 UND